# Patient Record
Sex: MALE | Race: WHITE | NOT HISPANIC OR LATINO | URBAN - METROPOLITAN AREA
[De-identification: names, ages, dates, MRNs, and addresses within clinical notes are randomized per-mention and may not be internally consistent; named-entity substitution may affect disease eponyms.]

---

## 2017-04-21 ENCOUNTER — ALLSCRIPTS OFFICE VISIT (OUTPATIENT)
Dept: OTHER | Facility: OTHER | Age: 27
End: 2017-04-21

## 2017-04-21 DIAGNOSIS — Z79.899 OTHER LONG TERM (CURRENT) DRUG THERAPY: ICD-10-CM

## 2017-04-21 DIAGNOSIS — F20.9 SCHIZOPHRENIA (HCC): ICD-10-CM

## 2017-08-14 ENCOUNTER — GENERIC CONVERSION - ENCOUNTER (OUTPATIENT)
Dept: OTHER | Facility: OTHER | Age: 27
End: 2017-08-14

## 2017-08-16 ENCOUNTER — ALLSCRIPTS OFFICE VISIT (OUTPATIENT)
Dept: OTHER | Facility: OTHER | Age: 27
End: 2017-08-16

## 2017-08-16 DIAGNOSIS — E78.5 HYPERLIPIDEMIA: ICD-10-CM

## 2017-08-16 DIAGNOSIS — Z79.899 OTHER LONG TERM (CURRENT) DRUG THERAPY: ICD-10-CM

## 2017-08-16 DIAGNOSIS — Z13.1 ENCOUNTER FOR SCREENING FOR DIABETES MELLITUS: ICD-10-CM

## 2017-09-12 ENCOUNTER — GENERIC CONVERSION - ENCOUNTER (OUTPATIENT)
Dept: OTHER | Facility: OTHER | Age: 27
End: 2017-09-12

## 2017-09-21 RX ORDER — BENZTROPINE MESYLATE 0.5 MG/1
0.5 TABLET ORAL 2 TIMES DAILY
COMMUNITY
End: 2018-05-16

## 2017-09-21 RX ORDER — OLANZAPINE 15 MG/1
15 TABLET ORAL
COMMUNITY

## 2017-09-26 ENCOUNTER — ANESTHESIA EVENT (OUTPATIENT)
Dept: PERIOP | Facility: HOSPITAL | Age: 27
End: 2017-09-26
Payer: MEDICARE

## 2017-09-27 ENCOUNTER — HOSPITAL ENCOUNTER (OUTPATIENT)
Facility: HOSPITAL | Age: 27
Setting detail: OUTPATIENT SURGERY
Discharge: HOME | End: 2017-09-27
Attending: INTERNAL MEDICINE | Admitting: INTERNAL MEDICINE
Payer: MEDICARE

## 2017-09-27 ENCOUNTER — ANESTHESIA (OUTPATIENT)
Dept: PERIOP | Facility: HOSPITAL | Age: 27
End: 2017-09-27
Payer: MEDICARE

## 2017-09-27 ENCOUNTER — GENERIC CONVERSION - ENCOUNTER (OUTPATIENT)
Dept: OTHER | Facility: OTHER | Age: 27
End: 2017-09-27

## 2017-09-27 VITALS
SYSTOLIC BLOOD PRESSURE: 118 MMHG | HEART RATE: 70 BPM | WEIGHT: 204 LBS | RESPIRATION RATE: 18 BRPM | HEIGHT: 68 IN | TEMPERATURE: 97.5 F | OXYGEN SATURATION: 100 % | BODY MASS INDEX: 30.92 KG/M2 | DIASTOLIC BLOOD PRESSURE: 76 MMHG

## 2017-09-27 RX ORDER — PROPOFOL 10 MG/ML
INJECTION, EMULSION INTRAVENOUS AS NEEDED
Status: DISCONTINUED | OUTPATIENT
Start: 2017-09-27 | End: 2017-09-27 | Stop reason: SURG

## 2017-09-27 RX ORDER — SODIUM CHLORIDE, SODIUM LACTATE, POTASSIUM CHLORIDE, CALCIUM CHLORIDE 600; 310; 30; 20 MG/100ML; MG/100ML; MG/100ML; MG/100ML
125 INJECTION, SOLUTION INTRAVENOUS CONTINUOUS
Status: DISCONTINUED | OUTPATIENT
Start: 2017-09-27 | End: 2017-09-27 | Stop reason: HOSPADM

## 2017-09-27 RX ADMIN — PROPOFOL 50 MG: 10 INJECTION, EMULSION INTRAVENOUS at 09:03

## 2017-09-27 RX ADMIN — PROPOFOL 50 MG: 10 INJECTION, EMULSION INTRAVENOUS at 08:59

## 2017-09-27 RX ADMIN — PROPOFOL 50 MG: 10 INJECTION, EMULSION INTRAVENOUS at 09:00

## 2017-09-27 RX ADMIN — PROPOFOL 100 MG: 10 INJECTION, EMULSION INTRAVENOUS at 08:58

## 2017-09-27 RX ADMIN — SODIUM CHLORIDE, SODIUM LACTATE, POTASSIUM CHLORIDE, AND CALCIUM CHLORIDE: .6; .31; .03; .02 INJECTION, SOLUTION INTRAVENOUS at 08:56

## 2017-09-27 NOTE — DISCHARGE INSTRUCTIONS
Colonoscopy   WHAT YOU NEED TO KNOW:   A colonoscopy is a procedure to examine the inside of your colon (intestine) with a scope  Polyps or tissue growths may have been removed during your colonoscopy  It is normal to feel bloated and to have some abdominal discomfort  You should be passing gas  If you have hemorrhoids or you had polyps removed, you may have a small amount of bleeding  DISCHARGE INSTRUCTIONS:   Seek care immediately if:   · You have a large amount of bright red blood in your bowel movements  · Your abdomen is hard and firm and you have severe pain  · You have sudden trouble breathing  Contact your healthcare provider if:   · You develop a rash or hives  · You have a fever within 24 hours of your procedure       · You have not had a bowel movement for 3 days after your procedure  · You have questions or concerns about your condition or care  Activity:   · Do not lift, strain, or run  for 3 days after your procedure  · Rest after your procedure  You have been given medicine to relax you  Do not  drive or make important decisions until the day after your procedure  Return to your normal activity as directed  · Relieve gas and discomfort from bloating  by lying on your right side with a heating pad on your abdomen  You may need to take short walks to help the gas move out  Eat small meals until bloating is relieved  If you had polyps removed: For 7 days after your procedure:  · Do not  take aspirin  · Do not  go on long car rides  Follow up with your healthcare provider as directed:  Write down your questions so you remember to ask them during your visits  © 2017 [employer] is for End User's use only and may not be sold, redistributed or otherwise used for commercial purposes  All illustrations and images included in CareNotes® are the copyrighted property of [employer]  or [employer]    The above information is an  only  It is not intended as medical advice for individual conditions or treatments  Talk to your doctor, nurse or pharmacist before following any medical regimen to see if it is safe and effective for you

## 2017-09-27 NOTE — OP NOTE
**** GI/ENDOSCOPY REPORT ****     PATIENT NAME: Darren High ------ VISIT ID:  Patient ID:   [id number] YOB: 1990     INTRODUCTION: Colonoscopy - A 33 male patient presents for an outpatient   Colonoscopy at [address] [place] [address]  PREVIOUS COLONOSCOPY: 1 year     INDICATIONS: Rectal bleeding  Change in bowel habits  Loss of weight  Hx   polyps FH colon ca Grandfather     CONSENT:  The benefits, risks, and alternatives to the procedure were   discussed and informed consent was obtained from the patient  PREPARATION: EKG, pulse, pulse oximetry and blood pressure were monitored   throughout the procedure  The patient was identified by myself both   verbally and by visual inspection of ID band  Airway Assessment   Classification: Airway class 2 - Visualization of the soft palate, fauces   and uvula  ASA Classification: Class 2 - Patient has mild to moderate   systemic disturbance that may or may not be related to the disorder   requiring surgery  MEDICATIONS: Anesthesia-check records     PROCEDURE:  The endoscope was passed with ease through the anus under   direct visualization and advanced to the terminal ileum, confirmed by   appendiceal orifice, cecal strap (crow's foot), and ileocecal valve  The   scope was withdrawn and the mucosa was carefully examined  The quality of   the preparation was excellent  Cecal Intubation Time: 2 minutes(s) Scope   Withdrawal Time: 4 minutes(s)     RECTAL EXAM: Normal rectal exam      FINDINGS:  The terminal ileum appeared to be normal  The colonoscopy   examination was completely normal  Normal retroversion  COMPLICATIONS: There were no complications  IMPRESSIONS: Normal terminal ileum  Normal colonoscopy  RECOMMENDATIONS: Colonoscopy recommended in 5 years  ESTIMATED BLOOD LOSS: None       PATHOLOGY SPECIMENS: No     PROCEDURE CODES: Colonoscopy     ICD-9 Codes: 569 3 Hemorrhage of rectum and anus 787 99 Other symptoms involving digestive system 783 21 Loss of weight     ICD-10 Codes: K62 5 Hemorrhage of anus and rectum R19 4 Change in bowel   habit R63 4 Abnormal weight loss     PERFORMED BY: Dr Caleb Olmsted, M D Myrna Moritz  on 09/27/2017  Version 1, electronically signed by JARRET Montaño , D O  on   09/27/2017 at 09:09

## 2017-10-06 ENCOUNTER — ALLSCRIPTS OFFICE VISIT (OUTPATIENT)
Dept: OTHER | Facility: OTHER | Age: 27
End: 2017-10-06

## 2017-10-31 ENCOUNTER — ALLSCRIPTS OFFICE VISIT (OUTPATIENT)
Dept: OTHER | Facility: OTHER | Age: 27
End: 2017-10-31

## 2017-11-01 NOTE — PROGRESS NOTES
Assessment  1  Impaired fasting glucose (790 21) (R73 01)   2  Elevated liver enzymes (790 5) (R74 8)   3  Encounter for preventive health examination (V70 0) (Z00 00)   4  Schizophrenia (295 90) (F20 9)    Plan  Elevated liver enzymes, Hyperlipidemia, Impaired fasting glucose    · (1) CBC/ PLT (NO DIFF); Status:Active; Requested for:30Apr2018;    · (1) COMPREHENSIVE METABOLIC PANEL; Status:Active; Requested for:30Apr2018;    · (1) HEMOGLOBIN A1C; Status:Active; Requested for:30Apr2018;    · (1) LIPID PANEL, FASTING; Status:Active; Requested for:30Apr2018;    · (1) TSH; Status:Active; Requested for:30Apr2018; Health Maintenance    · Benztropine Mesylate 0 5 MG Oral Tablet  Need for influenza vaccination    · Fluzone Quadrivalent Intramuscular Suspension    Discussion/Summary  Discussion Summary:   Impaired fasting glucose no evidence of diabetes lifestyle modifications diet exercise weight loss strongly encouraged  abuse he is down to 1-2 cigarettes a day encourage complete cessation  stable on current medication  increase in LDL likely related to his Zyprexa and lack of exercise discussed the importance of exercise low-cholesterol diet and weight loss recheck 6 months  shot given  Chief Complaint  Chief Complaint Free Text Note Form: Routine follow-up and review labs      History of Present Illness  HPI: Feeling wella colonoscopy which was essentially normal but because the family history he will [facility] a therapist through [address]   Complete-Male:   Constitutional: No fever or chills, feels well, no tiredness, no recent weight gain or weight loss  Eyes: No complaints of eye pain, no red eyes, no discharge from eyes, no itchy eyes  ENT: no complaints of earache, no hearing loss, no nosebleeds, no nasal discharge, no sore throat, no hoarseness     Cardiovascular: No complaints of slow heart rate, no fast heart rate, no chest pain, no palpitations, no leg claudication, no lower extremity  Respiratory: No complaints of shortness of breath, no wheezing, no cough, no SOB on exertion, no orthopnea or PND  Gastrointestinal: No complaints of abdominal pain, no constipation, no nausea or vomiting, no diarrhea or bloody stools  Genitourinary: No complaints of dysuria, no incontinence, no hesitancy, no nocturia, no genital lesion, no testicular pain  Musculoskeletal: No complaints of arthralgia, no myalgias, no joint swelling or stiffness, no limb pain or swelling  Integumentary: No complaints of skin rash or skin lesions, no itching, no skin wound, no dry skin  Neurological: No compliants of headache, no confusion, no convulsions, no numbness or tingling, no dizziness or fainting, no limb weakness, no difficulty walking  Psychiatric: Is not suicidal, no sleep disturbances, no anxiety or depression, no change in personality, no emotional problems  Endocrine: No complaints of proptosis, no hot flashes, no muscle weakness, no erectile dysfunction, no deepening of the voice, no feelings of weakness  Hematologic/Lymphatic: No complaints of swollen glands, no swollen glands in the neck, does not bleed easily, no easy bruising  Active Problems  1  Abdominal discomfort, epigastric (789 06) (R10 13)   2  Change in stool caliber (787 99) (R19 4)   3  Change in stool caliber (787 99) (R19 4)   4  Elevated liver enzymes (790 5) (R74 8)   5  Gastroesophageal reflux disease, esophagitis presence not specified (530 81) (K21 9)   6  Helicobacter pylori (H  pylori) infection (041 86) (A04 8)   7  Hematochezia (578 1) (K92 1)   8  High risk medication use (V58 69) (Z79 899)   9  History of colon polyps (V12 72) (Z86 010)   10  Hyperlipidemia (272 4) (E78 5)   11  Impaired fasting glucose (790 21) (R73 01)   12  Jock itch (698 9) (L29 8)   13  Morbid obesity due to excess calories (278 01) (E66 01)   14   Need for influenza vaccination (V04 81) (Z23)   15  Recent weight loss (783 21) (R63 4)   16  Schizophrenia (295 90) (F20 9)   17  Screening for cardiovascular condition (V81 2) (Z13 6)   18  Screening for diabetes mellitus (DM) (V77 1) (Z13 1)   19  Tobacco abuse (305 1) (Z72 0)    Past Medical History  1  History of Hematochezia (578 1) (K92 1)   2  History of psychosis (V11 8) (Z86 59)  Active Problems And Past Medical History Reviewed: The active problems and past medical history were reviewed and updated today  Surgical History  Surgical History Reviewed: The surgical history was reviewed and updated today  Family History  Family History    1  Family history of hypertension (V17 49) (Z82 49)   2  Family history of malignant neoplasm (V16 9) (Z80 9)  Family History Reviewed: The family history was reviewed and updated today  Social History   · Alcohol Use (History)   · Denied: Drug Use (305 90)   · History of Former smoker (V01 10) (L15 829)   · Never a smoker  Social History Reviewed: The social history was reviewed and updated today  Current Meds   1  Benztropine Mesylate 0 5 MG Oral Tablet; TAKE 1 TABLET AT BEDTIME; Therapy: 21Apr2017 to Recorded   2  Hydrocortisone 2 5 % Rectal Cream; USE 1 APPLICATORFUL RECTALLY 1-2  TIMES DAILY as   needed; Therapy: 97UUA8761 to (Last Rx:[id number])  Requested for: 51Ysz4494 Ordered   3  ZyPREXA 15 MG Oral Tablet (OLANZapine); TAKE 1 TABLET DAILY; Therapy: 21Apr2017 to Recorded  Medication List Reviewed: The medication list was reviewed and updated today  Allergies  1  No Known Drug Allergies    Vitals  Vital Signs    Recorded: 30BXP1137 02:31PM   Heart Rate 89   Systolic 481   Diastolic 82   Height 5 ft 11 in   Weight 207 lb 2 0 oz   BMI Calculated 28 89   BSA Calculated 2 14   O2 Saturation 98     Physical Exam    Constitutional   General appearance: No acute distress, well appearing and well nourished  Eyes   Conjunctiva and lids: No swelling, erythema, or discharge  Ears, Nose, Mouth, and Throat   Oropharynx: Normal with no erythema, edema, exudate or lesions  Pulmonary   Respiratory effort: No increased work of breathing or signs of respiratory distress  Auscultation of lungs: Clear to auscultation, equal breath sounds bilaterally, no wheezes, no rales, no rhonci  Cardiovascular   Auscultation of heart: Normal rate and rhythm, normal S1 and S2, without murmurs  Examination of extremities for edema and/or varicosities: Normal     Abdomen   Abdomen: Non-tender, no masses  Lymphatic   Palpation of lymph nodes in neck: No lymphadenopathy  Musculoskeletal   Gait and station: Normal     Skin   Skin and subcutaneous tissue: Normal without rashes or lesions  Neurologic   Cranial nerves: Cranial nerves 2-12 intact  Health Management  Health Maintenance   COLONOSCOPY; every 5 years; Last [id number]; Next Due: 69IUY7684; Active    Future Appointments    Date/Time Provider Specialty Site   05/16/2018 01:00 PM JARRET Haddad   Internal Medicine [facility]     Signatures   Electronically signed by : Porfirio Giordano, [address] [place] [address]; Oct 31 2017  3:03PM EST                       (Author)    Electronically signed by : JARRET Escalona ; Oct 31 2017  6:18PM EST

## 2018-01-12 NOTE — MISCELLANEOUS
Signatures   Electronically signed by : Williams Bolaños MD; Oct  6 2017  9:19AM EST                       (Author)

## 2018-01-13 VITALS
WEIGHT: 212 LBS | HEIGHT: 71 IN | HEART RATE: 80 BPM | SYSTOLIC BLOOD PRESSURE: 110 MMHG | BODY MASS INDEX: 29.68 KG/M2 | DIASTOLIC BLOOD PRESSURE: 80 MMHG

## 2018-01-14 VITALS
HEIGHT: 71 IN | OXYGEN SATURATION: 98 % | WEIGHT: 207.13 LBS | DIASTOLIC BLOOD PRESSURE: 82 MMHG | SYSTOLIC BLOOD PRESSURE: 110 MMHG | HEART RATE: 89 BPM | BODY MASS INDEX: 29 KG/M2

## 2018-01-14 VITALS
RESPIRATION RATE: 14 BRPM | BODY MASS INDEX: 28.86 KG/M2 | DIASTOLIC BLOOD PRESSURE: 80 MMHG | HEIGHT: 71 IN | WEIGHT: 206.13 LBS | HEART RATE: 66 BPM | SYSTOLIC BLOOD PRESSURE: 104 MMHG

## 2018-01-22 VITALS
WEIGHT: 208.13 LBS | HEIGHT: 71 IN | BODY MASS INDEX: 29.14 KG/M2 | DIASTOLIC BLOOD PRESSURE: 70 MMHG | SYSTOLIC BLOOD PRESSURE: 100 MMHG | HEART RATE: 72 BPM

## 2018-04-30 DIAGNOSIS — E78.5 HYPERLIPIDEMIA: ICD-10-CM

## 2018-04-30 DIAGNOSIS — R74.8 ABNORMAL LEVELS OF OTHER SERUM ENZYMES: ICD-10-CM

## 2018-04-30 DIAGNOSIS — R73.01 IMPAIRED FASTING GLUCOSE: ICD-10-CM

## 2018-05-13 ENCOUNTER — HOSPITAL ENCOUNTER (EMERGENCY)
Facility: HOSPITAL | Age: 28
Discharge: HOME | End: 2018-05-13
Attending: EMERGENCY MEDICINE
Payer: MEDICARE

## 2018-05-13 VITALS
TEMPERATURE: 99.9 F | DIASTOLIC BLOOD PRESSURE: 91 MMHG | WEIGHT: 200 LBS | OXYGEN SATURATION: 99 % | RESPIRATION RATE: 20 BRPM | HEART RATE: 118 BPM | SYSTOLIC BLOOD PRESSURE: 141 MMHG | BODY MASS INDEX: 27.89 KG/M2

## 2018-05-13 DIAGNOSIS — R53.1 WEAKNESS: Primary | ICD-10-CM

## 2018-05-13 LAB
ALBUMIN SERPL BCP-MCNC: 4.2 G/DL (ref 3.5–5)
ALP SERPL-CCNC: 70 U/L (ref 46–116)
ALT SERPL W P-5'-P-CCNC: 105 U/L (ref 12–78)
ANION GAP SERPL CALCULATED.3IONS-SCNC: 10 MMOL/L (ref 4–13)
AST SERPL W P-5'-P-CCNC: 40 U/L (ref 5–45)
ATRIAL RATE: 101 BPM
BASOPHILS # BLD AUTO: 0.03 THOUSANDS/ΜL (ref 0–0.1)
BASOPHILS NFR BLD AUTO: 0 % (ref 0–1)
BILIRUB DIRECT SERPL-MCNC: 0.09 MG/DL (ref 0–0.2)
BILIRUB SERPL-MCNC: 0.8 MG/DL (ref 0.2–1)
BUN SERPL-MCNC: 11 MG/DL (ref 5–25)
CALCIUM SERPL-MCNC: 9.6 MG/DL (ref 8.3–10.1)
CHLORIDE SERPL-SCNC: 101 MMOL/L (ref 100–108)
CO2 SERPL-SCNC: 25 MMOL/L (ref 21–32)
CREAT SERPL-MCNC: 1.04 MG/DL (ref 0.6–1.3)
EOSINOPHIL # BLD AUTO: 0.04 THOUSAND/ΜL (ref 0–0.61)
EOSINOPHIL NFR BLD AUTO: 0 % (ref 0–6)
ERYTHROCYTE [DISTWIDTH] IN BLOOD BY AUTOMATED COUNT: 12.2 % (ref 11.6–15.1)
GFR SERPL CREATININE-BSD FRML MDRD: 98 ML/MIN/1.73SQ M
GLUCOSE SERPL-MCNC: 103 MG/DL (ref 65–140)
HCT VFR BLD AUTO: 45.3 % (ref 36.5–49.3)
HGB BLD-MCNC: 15.8 G/DL (ref 12–17)
LYMPHOCYTES # BLD AUTO: 0.8 THOUSANDS/ΜL (ref 0.6–4.47)
LYMPHOCYTES NFR BLD AUTO: 8 % (ref 14–44)
MCH RBC QN AUTO: 29.8 PG (ref 26.8–34.3)
MCHC RBC AUTO-ENTMCNC: 34.9 G/DL (ref 31.4–37.4)
MCV RBC AUTO: 85 FL (ref 82–98)
MONOCYTES # BLD AUTO: 0.55 THOUSAND/ΜL (ref 0.17–1.22)
MONOCYTES NFR BLD AUTO: 5 % (ref 4–12)
NEUTROPHILS # BLD AUTO: 8.89 THOUSANDS/ΜL (ref 1.85–7.62)
NEUTS SEG NFR BLD AUTO: 86 % (ref 43–75)
NRBC BLD AUTO-RTO: 0 /100 WBCS
P AXIS: 51 DEGREES
PLATELET # BLD AUTO: 241 THOUSANDS/UL (ref 149–390)
PMV BLD AUTO: 9.2 FL (ref 8.9–12.7)
POTASSIUM SERPL-SCNC: 4.4 MMOL/L (ref 3.5–5.3)
PR INTERVAL: 138 MS
PROT SERPL-MCNC: 8.3 G/DL (ref 6.4–8.2)
QRS AXIS: 46 DEGREES
QRSD INTERVAL: 88 MS
QT INTERVAL: 342 MS
QTC INTERVAL: 443 MS
RBC # BLD AUTO: 5.31 MILLION/UL (ref 3.88–5.62)
SODIUM SERPL-SCNC: 136 MMOL/L (ref 136–145)
T WAVE AXIS: 40 DEGREES
TSH SERPL DL<=0.05 MIU/L-ACNC: 0.76 UIU/ML (ref 0.36–3.74)
VENTRICULAR RATE: 101 BPM
WBC # BLD AUTO: 10.37 THOUSAND/UL (ref 4.31–10.16)

## 2018-05-13 PROCEDURE — 80048 BASIC METABOLIC PNL TOTAL CA: CPT | Performed by: EMERGENCY MEDICINE

## 2018-05-13 PROCEDURE — 84443 ASSAY THYROID STIM HORMONE: CPT | Performed by: EMERGENCY MEDICINE

## 2018-05-13 PROCEDURE — 96360 HYDRATION IV INFUSION INIT: CPT

## 2018-05-13 PROCEDURE — 93005 ELECTROCARDIOGRAM TRACING: CPT

## 2018-05-13 PROCEDURE — 85025 COMPLETE CBC W/AUTO DIFF WBC: CPT | Performed by: EMERGENCY MEDICINE

## 2018-05-13 PROCEDURE — 99284 EMERGENCY DEPT VISIT MOD MDM: CPT

## 2018-05-13 PROCEDURE — 36415 COLL VENOUS BLD VENIPUNCTURE: CPT | Performed by: EMERGENCY MEDICINE

## 2018-05-13 PROCEDURE — 80076 HEPATIC FUNCTION PANEL: CPT | Performed by: EMERGENCY MEDICINE

## 2018-05-13 PROCEDURE — 93010 ELECTROCARDIOGRAM REPORT: CPT | Performed by: INTERNAL MEDICINE

## 2018-05-13 RX ORDER — OLANZAPINE 2.5 MG/1
5 TABLET ORAL ONCE
Status: COMPLETED | OUTPATIENT
Start: 2018-05-13 | End: 2018-05-13

## 2018-05-13 RX ADMIN — SODIUM CHLORIDE 1000 ML: 0.9 INJECTION, SOLUTION INTRAVENOUS at 20:27

## 2018-05-13 RX ADMIN — OLANZAPINE 5 MG: 2.5 TABLET, FILM COATED ORAL at 20:12

## 2018-05-13 NOTE — ED PROVIDER NOTES
History  Chief Complaint   Patient presents with    Headache     Pt states that he forgot to take his zyprexa last night and states that since then he has been having bodyaches, headaches and racing heart  HPI patient is a 55-year-old male, he reports that last night he forgot to take his Zyprexa, reports since then he has felt like he feels ill, reports body aches, feels like his heart is racing  Patient reports feeling just out of sorts  He denies any shortness of breath  Denies any chest pain  There is no loss of consciousness  Denies any focal weakness  Patient reports he just feels in general bad  Past medical history of GE reflux, elevated cholesterol, psychosis  Family history noncontributory  Social history, nonsmoker no history of drug abuse  Prior to Admission Medications   Prescriptions Last Dose Informant Patient Reported? Taking? OLANZapine (ZyPREXA) 15 mg tablet   Yes No   Sig: Take 15 mg by mouth daily at bedtime   benztropine (COGENTIN) 0 5 mg tablet   Yes No   Sig: Take 0 5 mg by mouth 2 (two) times a day      Facility-Administered Medications: None       Past Medical History:   Diagnosis Date    GERD (gastroesophageal reflux disease)     H  pylori infection     Hematochezia     Hypercholesteremia     Hyperlipidemia     Psychosis     Last Assessed:  11/19/15    Schizophrenia [facility])        Past Surgical History:   Procedure Laterality Date    COLONOSCOPY      [place] COLONOSCOPY FLX DX W/COLLJ SPEC WHEN PFRMD N/A 9/27/2017    Procedure: COLONOSCOPY;  Surgeon: Divina Lara MD;  Location: [facility]; Service: Gastroenterology       Family History   Problem Relation Age of Onset    Hypertension Family     Cancer Family      I have reviewed and agree with the history as documented      Social History   Substance Use Topics    Smoking status: Former Smoker    Smokeless tobacco: Never Used      Comment: Per [employer]:  Never a smoker    Alcohol use Yes      Comment: occ Review of Systems   Constitutional: Negative for diaphoresis, fatigue and fever  HENT: Negative for congestion, ear pain, nosebleeds and sore throat  Eyes: Negative for photophobia, pain, discharge and visual disturbance  Respiratory: Negative for cough, choking, chest tightness, shortness of breath and wheezing  Cardiovascular: Positive for palpitations  Negative for chest pain  Gastrointestinal: Negative for abdominal distention, abdominal pain, diarrhea and vomiting  Genitourinary: Negative for dysuria, flank pain and frequency  Musculoskeletal: Negative for back pain, gait problem and joint swelling  Skin: Negative for color change and rash  Neurological: Positive for weakness  Negative for dizziness, syncope and headaches  Psychiatric/Behavioral: Negative for behavioral problems and confusion  The patient is not nervous/anxious  All other systems reviewed and are negative  Reports diffuse body aches    Physical Exam  ED Triage Vitals   Temperature Pulse Respirations Blood Pressure SpO2   05/13/18 1951 05/13/18 1949 05/13/18 1949 05/13/18 1949 05/13/18 1949   99 9 °F (37 7 °C) (!) 118 20 141/91 99 %      Temp Source Heart Rate Source Patient Position - Orthostatic VS BP Location FiO2 (%)   05/13/18 1949 05/13/18 1949 05/13/18 1949 05/13/18 1949 --   Oral Monitor Sitting Left arm       Pain Score       05/13/18 1949       5           Orthostatic Vital Signs  Vitals:    05/13/18 1949   BP: 141/91   Pulse: (!) 118   Patient Position - Orthostatic VS: Sitting       Physical Exam   Constitutional: He is oriented to person, place, and time  He appears well-developed and well-nourished  HENT:   Head: Normocephalic  Right Ear: External ear normal    Left Ear: External ear normal    Nose: Nose normal    Mouth/Throat: Oropharynx is clear and moist    Eyes: EOM and lids are normal  Pupils are equal, round, and reactive to light  Neck: Normal range of motion  Neck supple  Cardiovascular: Normal rate, regular rhythm and normal heart sounds  Pulmonary/Chest: Effort normal and breath sounds normal  No respiratory distress  Abdominal: Soft  Bowel sounds are normal    Musculoskeletal: Normal range of motion  He exhibits no deformity  Neurological: He is alert and oriented to person, place, and time  Skin: Skin is warm and dry  Psychiatric: He has a normal mood and affect  Nursing note and vitals reviewed  Pulse oximetry 99% on room air adequate oxygenation, there is no hypoxia    ED Medications  Medications   sodium chloride 0 9 % bolus 1,000 mL (1,000 mL Intravenous New Bag 5/13/18 2027)   OLANZapine (ZyPREXA) tablet 5 mg (5 mg Oral Given 5/13/18 2012)       Diagnostic Studies  Results Reviewed     Procedure Component Value Units Date/Time    Basic metabolic panel [[id number]] Collected:  05/13/18 2024    Lab Status:  Final result Specimen:  Blood from Arm, Right Updated:  05/13/18 2108     Sodium 136 mmol/L      Potassium 4 4 mmol/L      Chloride 101 mmol/L      CO2 25 mmol/L      Anion Gap 10 mmol/L      BUN 11 mg/dL      Creatinine 1 04 mg/dL      Glucose 103 mg/dL      Calcium 9 6 mg/dL      eGFR 98 ml/min/1 73sq m     Narrative:         National Kidney Disease Education Program recommendations are as follows:  GFR calculation is accurate only with a steady state creatinine  Chronic Kidney disease less than 60 ml/min/1 73 sq  meters  Kidney failure less than 15 ml/min/1 73 sq  meters      Hepatic function panel [[id number]]  (Abnormal) Collected:  05/13/18 2024    Lab Status:  Final result Specimen:  Blood from Arm, Right Updated:  05/13/18 2108     Total Bilirubin 0 80 mg/dL      Bilirubin, Direct 0 09 mg/dL      Alkaline Phosphatase 70 U/L      AST 40 U/L       (H) U/L      Total Protein 8 3 (H) g/dL      Albumin 4 2 g/dL     TSH [[id number]]  (Normal) Collected:  05/13/18 2024    Lab Status:  Final result Specimen:  Blood from Arm, Right Updated:  05/13/18 2108 TSH 3RD GENERATON 0 763 uIU/mL     Narrative:         Patients undergoing fluorescein dye angiography may retain small amounts of fluorescein in the body for 48-72 hours post procedure  Samples containing fluorescein can produce falsely depressed TSH values  If the patient had this procedure,a specimen should be resubmitted post fluorescein clearance  CBC and differential [[id number]]  (Abnormal) Collected:  05/13/18 2024    Lab Status:  Final result Specimen:  Blood from Hand, Left Updated:  05/13/18 2036     WBC 10 37 (H) Thousand/uL      RBC 5 31 Million/uL      Hemoglobin 15 8 g/dL      Hematocrit 45 3 %      MCV 85 fL      MCH 29 8 pg      MCHC 34 9 g/dL      RDW 12 2 %      MPV 9 2 fL      Platelets 359 Thousands/uL      nRBC 0 /100 WBCs      Neutrophils Relative 86 (H) %      Lymphocytes Relative 8 (L) %      Monocytes Relative 5 %      Eosinophils Relative 0 %      Basophils Relative 0 %      Neutrophils Absolute 8 89 (H) Thousands/µL      Lymphocytes Absolute 0 80 Thousands/µL      Monocytes Absolute 0 55 Thousand/µL      Eosinophils Absolute 0 04 Thousand/µL      Basophils Absolute 0 03 Thousands/µL                  No orders to display              Procedures  Procedures       Phone Contacts  ED Phone Contact    ED Course         Diagnostic testing showed normal electrolytes, patient's blood sugar was 103, patient was told previously was prediabetic no sign of elevated blood sugar at this time, patient's liver functions were within normal limits, thyroid functions were normal  White count was minimally elevated at 10 3, hemoglobin was normal no sign of anemia  Patient markedly improved with hydration, also felt improved with additional dose of Zyprexa  MDM medical decision making 52-year-old male, feels weak and not himself, body aches after missing his Zyprexa dose last night, now seems improved, improved with hydration improved with additional Zyprexa    We discussed outpatient treatment and follow-up, we discussed indications to return  CritCare Time    Disposition  Final diagnoses:   Weakness     Time reflects when diagnosis was documented in both MDM as applicable and the Disposition within this note     Time User Action Codes Description Comment    5/13/2018  9:13 PM Nbasylvia Bryan Add [R53 1] Weakness       ED Disposition     ED Disposition Condition Comment    Discharge  Mario Astorga discharge to home/self care  Condition at discharge: Good        Follow-up Information     Follow up With Specialties Details Why Rose Berry MD Internal Medicine   [address] [place] [phone]          Patient's Medications   Discharge Prescriptions    No medications on file     No discharge procedures on file      ED Provider  Electronically Signed by           Luis Carlos Mancuso MD  05/13/18 [id number]

## 2018-05-14 NOTE — ED NOTES
D/c instructions reviewed with pt  Pt verbalized understanding and has no further questions at this time  Pt ambulatory off unit with steady gait       Arpan Saez RN  05/13/18 7493

## 2018-05-14 NOTE — DISCHARGE INSTRUCTIONS
Medications as normal  Rest  Increase liquids  Follow up with your doctor or return if worse  Weakness   WHAT YOU NEED TO KNOW:   Weakness is a loss of muscle strength  It may be caused by brain, nerve, or muscle problems  Physical and mental conditions such as heart problems, pregnancy, dehydration, or depression may also cause weakness  Reactions to certain drugs can cause weakness  Parts of your body may become weak if you need to wear a cast or splint or have been on bed rest for a long time  DISCHARGE INSTRUCTIONS:   Call 911 for any of the following:   · You have any of the following signs of a stroke:      ¨ Numbness or drooping on one side of your face     ¨ Weakness in an arm or leg    ¨ Confusion or difficulty speaking    ¨ Dizziness, a severe headache, or vision loss    · You lose feeling in your weakened body area  · You have electric shock-like feelings down your arms and legs when you flex or move your neck  · You have sudden or increased trouble speaking, swallowing, or breathing  Return to the emergency department if:   · You have severe pain in your back, arms, or legs that worsens  · You have sudden or worsened muscle weakness or loss of movement  · You are not able to control when you urinate or have a bowel movement  Contact your healthcare provider if:   · You feel depressed or anxious  · You have questions or concerns about your condition or care  Manage weakness:   · Use assistive devices as directed  These help protect you from injury  Examples include a walker or cane  Have someone install handrails in your home  These will help you get out of a bathtub or stand up from a toilet  Use a shower chair so you can sit while you shower  Sit down on the toilet or another chair to dry off and put on your clothes  Get help going up and down stairs if your legs are weak  · Go to physical or occupational therapy if directed    A physical therapist can teach you exercises to help strengthen weak muscles  An occupational therapist can show you ways to do your daily activities more easily  For example, light forks and spoons can be easier to use if you have hand weakness  You may also learn ways to organize your household items so you are not moving heavy items  · Balance rest with exercise  Exercise can help increase your muscle strength and energy  Do not exercise for long periods at a time  Take breaks often to rest  Too much exercise can cause muscle strain or make you more tired  Ask your healthcare provider how much exercise is right for you  · Eat a variety of healthy foods  Too much or too little food may cause weakness or tiredness  Ask your healthcare provider what a healthy amount of food is for you  Healthy foods include fruits, vegetables, whole-grain breads, low-fat dairy products, lean meats and fish, nuts, and cooked beans  · Do not smoke  Nicotine and other chemicals in cigarettes and cigars can make your symptoms worse, and can cause lung damage  Ask your healthcare provider for information if you currently smoke and need help to quit  E-cigarettes or smokeless tobacco still contain nicotine  Talk to your healthcare provider before you use these products  · Do not use caffeine, alcohol, or illegal drugs  These may cause muscle twitching, which could lead to worsened weakness  Follow up with your healthcare provider as directed:  Write down your questions so you remember to ask them during your visits  © 2017 [address] [employer] Information is for End User's use only and may not be sold, redistributed or otherwise used for commercial purposes  All illustrations and images included in CareNotes® are the copyrighted property of A [employer]  or Anjum De La [employer]  The above information is an  only  It is not intended as medical advice for individual conditions or treatments   Talk to your doctor, nurse or pharmacist before following any medical regimen to see if it is safe and effective for you

## 2018-05-15 RX ORDER — OLANZAPINE 15 MG/1
1 TABLET ORAL DAILY
COMMUNITY
Start: 2017-04-21 | End: 2018-05-16 | Stop reason: CLARIF

## 2018-05-16 ENCOUNTER — OFFICE VISIT (OUTPATIENT)
Dept: INTERNAL MEDICINE CLINIC | Facility: CLINIC | Age: 28
End: 2018-05-16
Payer: MEDICARE

## 2018-05-16 ENCOUNTER — APPOINTMENT (OUTPATIENT)
Dept: LAB | Facility: CLINIC | Age: 28
End: 2018-05-16
Payer: MEDICARE

## 2018-05-16 VITALS
SYSTOLIC BLOOD PRESSURE: 110 MMHG | RESPIRATION RATE: 14 BRPM | HEART RATE: 80 BPM | BODY MASS INDEX: 32.4 KG/M2 | HEIGHT: 68 IN | WEIGHT: 213.8 LBS | DIASTOLIC BLOOD PRESSURE: 80 MMHG

## 2018-05-16 DIAGNOSIS — B35.6 JOCK ITCH: ICD-10-CM

## 2018-05-16 DIAGNOSIS — R74.01 ELEVATED TRANSAMINASE LEVEL: Primary | ICD-10-CM

## 2018-05-16 DIAGNOSIS — R74.01 ELEVATED TRANSAMINASE LEVEL: ICD-10-CM

## 2018-05-16 DIAGNOSIS — R19.7 DIARRHEA, UNSPECIFIED TYPE: ICD-10-CM

## 2018-05-16 DIAGNOSIS — R74.8 ELEVATED LIVER ENZYMES: ICD-10-CM

## 2018-05-16 DIAGNOSIS — K21.9 GASTROESOPHAGEAL REFLUX DISEASE WITHOUT ESOPHAGITIS: ICD-10-CM

## 2018-05-16 DIAGNOSIS — E78.2 MIXED HYPERLIPIDEMIA: ICD-10-CM

## 2018-05-16 DIAGNOSIS — R73.01 IMPAIRED FASTING GLUCOSE: ICD-10-CM

## 2018-05-16 DIAGNOSIS — F20.9 SCHIZOPHRENIA, UNSPECIFIED TYPE (HCC): ICD-10-CM

## 2018-05-16 PROBLEM — E78.5 HYPERLIPIDEMIA: Status: ACTIVE | Noted: 2017-08-16

## 2018-05-16 LAB
FERRITIN SERPL-MCNC: 268 NG/ML (ref 8–388)
IRON SATN MFR SERPL: 18 %
IRON SERPL-MCNC: 58 UG/DL (ref 65–175)
TIBC SERPL-MCNC: 327 UG/DL (ref 250–450)

## 2018-05-16 PROCEDURE — 83540 ASSAY OF IRON: CPT

## 2018-05-16 PROCEDURE — 83550 IRON BINDING TEST: CPT

## 2018-05-16 PROCEDURE — 36415 COLL VENOUS BLD VENIPUNCTURE: CPT

## 2018-05-16 PROCEDURE — 99214 OFFICE O/P EST MOD 30 MIN: CPT | Performed by: INTERNAL MEDICINE

## 2018-05-16 PROCEDURE — 82390 ASSAY OF CERULOPLASMIN: CPT

## 2018-05-16 PROCEDURE — 86256 FLUORESCENT ANTIBODY TITER: CPT

## 2018-05-16 PROCEDURE — 82728 ASSAY OF FERRITIN: CPT

## 2018-05-16 PROCEDURE — 86038 ANTINUCLEAR ANTIBODIES: CPT

## 2018-05-16 PROCEDURE — 87340 HEPATITIS B SURFACE AG IA: CPT

## 2018-05-16 PROCEDURE — 86704 HEP B CORE ANTIBODY TOTAL: CPT

## 2018-05-16 PROCEDURE — 86803 HEPATITIS C AB TEST: CPT

## 2018-05-16 PROCEDURE — 86705 HEP B CORE ANTIBODY IGM: CPT

## 2018-05-16 PROCEDURE — 86235 NUCLEAR ANTIGEN ANTIBODY: CPT

## 2018-05-16 RX ORDER — NYSTATIN 100000 U/G
CREAM TOPICAL 2 TIMES DAILY
Qty: 30 G | Refills: 0 | Status: SHIPPED | OUTPATIENT
Start: 2018-05-16 | End: 2018-05-17 | Stop reason: SDUPTHER

## 2018-05-16 RX ORDER — RANITIDINE 150 MG/1
150 TABLET ORAL 2 TIMES DAILY
Qty: 60 TABLET | Refills: 3 | Status: SHIPPED | OUTPATIENT
Start: 2018-05-16 | End: 2018-05-17 | Stop reason: SDUPTHER

## 2018-05-16 NOTE — PATIENT INSTRUCTIONS
Lab data reviewed and compared prior     Impaired fasting glucose remains stable with A1c 5 3     Hyperlipidemia - elevated triglycerides, low HDL, acceptable LDL-no indication for medication, increased aerobic exercise with a goal of 30 min per day, 5 days per week and weight loss as able are advised    Schizophrenia managed by psychiatry  Generalized weakness and tachycardia is of unclear etiology -symptoms and vital signs have stabilized, will continue to monitor    GERD -get back on Zantac /ranitidine 150 mg once or twice daily as needed  Lifestyle modifications including weight loss, avoiding over eating, avoiding laying down within 3 hr of eating, avoiding spicy foods, caffeine and nicotine can all be beneficial       Diarrhea - this appears to be self limited, exam and history are unremarkable  Follow a brat diet until symptoms resolve brat stands for bananas, rice, applesauce and toast   Consider using a probiotic such as yogurt or over-the-counter probiotic  Also consider Pepto-Bismol or Kaopectate as needed if symptoms fail to improve  If symptoms persist beyond 1 week you needle lab workup, contact me  Elevated transaminases -this is been present for at least 1 years reviewing old labs but has not been worked out to rule out underlying autoimmune, infiltrative or infectious etiology, complete lab workup and ultrasound as ordered  Can be related to non alcoholic fatty liver disease versus medication effect versus other  Jock itch -apply nystatin cream twice daily, attempt to keep the area dry and clean        Follow up with me after a for mentioned workup, sooner as needed

## 2018-05-16 NOTE — PROGRESS NOTES
Assessment/Plan:    Patient Instructions    Generalized weakness and tachycardia is of unclear etiology -symptoms and vital signs have stabilized, will continue to monitor    GERD -get back on Zantac /ranitidine 150 mg once or twice daily as needed  Lifestyle modifications including weight loss, avoiding over eating, avoiding laying down within 3 hr of eating, avoiding spicy foods, caffeine and nicotine can all be beneficial       Diarrhea - this appears to be self limited, exam and history are unremarkable  Follow a brat diet until symptoms resolve brat stands for bananas, rice, applesauce and toast   Consider using a probiotic such as yogurt or over-the-counter probiotic  Also consider Pepto-Bismol or Kaopectate as needed if symptoms fail to improve  If symptoms persist beyond 1 week you needle lab workup, contact me  Elevated transaminases -this is been present for at least 1 years reviewing old labs but has not been worked out to rule out underlying autoimmune, infiltrative or infectious etiology, complete lab workup and ultrasound as ordered  Can be related to non alcoholic fatty liver disease versus medication effect versus other  Jock itch -apply nystatin cream twice daily, attempt to keep the area dry and clean  Follow up with me after a for mentioned workup, sooner as needed         Diagnoses and all orders for this visit:    Elevated transaminase level  -     MYCHAL SCR IFA W RFLX TTR/PTRN/LUP PNL 2(Q) + PNL5(HISTORICAL); Future  -     Anti-Smooth Muscle/Mitochond; Future  -     Ceruloplasmin; Future  -     Chronic Hepatitis Panel; Future  -     Iron Saturation %; Future  -     Iron Panel; Future  -     US right upper quadrant with liver dopplers; Future    Diarrhea, unspecified type    Gastroesophageal reflux disease without esophagitis  -     ranitidine (ZANTAC) 150 mg tablet;  Take 1 tablet (150 mg total) by mouth 2 (two) times a day    Impaired fasting glucose    Jock itch  - nystatin (MYCOSTATIN) cream; Apply topically 2 (two) times a day    Elevated liver enzymes    Mixed hyperlipidemia    Schizophrenia, unspecified type ([facility] [address] )    Other orders  -     Discontinue: hydrocortisone (ANUSOL-HC, PROCTOSOL HC,) 2 5 % rectal cream; Insert into the rectum  -     Discontinue: OLANZapine (ZYPREXA) 15 mg tablet; Take 1 tablet by mouth daily         Subjective:      Patient ID: Inna Gomez is a 33 y o  male    Patient presents with a number of concerns    Patient was seen in the emergency department on May 13th because he was not feeling well he was feeling anxious and weak  He was noted to have borderline blood pressure, elevated heart rate 118  Lab workup was essentially unremarkable and he was discharged  The feeling was it may have been related to skipping a dose of Zyprexa the night before  He is back to his baseline as far as that is concerned today  He stopped Cogentin last year  He continues on Zyprexa [facility] of [facility]       He notes 1 day of diarrhea, 6 episodes yesterday and 1 episode today  There has been no blood or mucus in the stool  No fevers or chills  He has not had any abdominal pain or nausea or vomiting  He feels as though this is improving  He has not taken anything for the diarrhea  There has been no recent antibiotics  No recent travel  He notes many years of jock itch that have failed over-the-counter creams as well as 1 prescription cream in the past that he thinks was hydrocortisone    He was able quit smoking last October       he also notes GERD symptoms that in the past did well with ranitidine          Current Outpatient Prescriptions:     OLANZapine (ZyPREXA) 15 mg tablet, Take 15 mg by mouth daily at bedtime, Disp: , Rfl:     nystatin (MYCOSTATIN) cream, Apply topically 2 (two) times a day, Disp: 30 g, Rfl: 0    ranitidine (ZANTAC) 150 mg tablet, Take 1 tablet (150 mg total) by mouth 2 (two) times a day, Disp: 60 tablet, Rfl: 3    Recent Results (from the past 1008 hour(s))   CBC and differential    Collection Time: 05/13/18  8:24 PM   Result Value Ref Range    WBC 10 37 (H) 4 31 - 10 16 Thousand/uL    RBC 5 31 3 88 - 5 62 Million/uL    Hemoglobin 15 8 12 0 - 17 0 g/dL    Hematocrit 45 3 36 5 - 49 3 %    MCV 85 82 - 98 fL    MCH 29 8 26 8 - 34 3 pg    MCHC 34 9 31 4 - 37 4 g/dL    RDW 12 2 11 6 - 15 1 %    MPV 9 2 8 9 - 12 7 fL    Platelets 140 084 - 979 Thousands/uL    nRBC 0 /100 WBCs    Neutrophils Relative 86 (H) 43 - 75 %    Lymphocytes Relative 8 (L) 14 - 44 %    Monocytes Relative 5 4 - 12 %    Eosinophils Relative 0 0 - 6 %    Basophils Relative 0 0 - 1 %    Neutrophils Absolute 8 89 (H) 1 85 - 7 62 Thousands/µL    Lymphocytes Absolute 0 80 0 60 - 4 47 Thousands/µL    Monocytes Absolute 0 55 0 17 - 1 22 Thousand/µL    Eosinophils Absolute 0 04 0 00 - 0 61 Thousand/µL    Basophils Absolute 0 03 0 00 - 0 10 Thousands/µL   Basic metabolic panel    Collection Time: 05/13/18  8:24 PM   Result Value Ref Range    Sodium 136 136 - 145 mmol/L    Potassium 4 4 3 5 - 5 3 mmol/L    Chloride 101 100 - 108 mmol/L    CO2 25 21 - 32 mmol/L    Anion Gap 10 4 - 13 mmol/L    BUN 11 5 - 25 mg/dL    Creatinine 1 04 0 60 - 1 30 mg/dL    Glucose 103 65 - 140 mg/dL    Calcium 9 6 8 3 - 10 1 mg/dL    eGFR 98 ml/min/1 73sq m   Hepatic function panel    Collection Time: 05/13/18  8:24 PM   Result Value Ref Range    Total Bilirubin 0 80 0 20 - 1 00 mg/dL    Bilirubin, Direct 0 09 0 00 - 0 20 mg/dL    Alkaline Phosphatase 70 46 - 116 U/L    AST 40 5 - 45 U/L     (H) 12 - 78 U/L    Total Protein 8 3 (H) 6 4 - 8 2 g/dL    Albumin 4 2 3 5 - 5 0 g/dL   TSH    Collection Time: 05/13/18  8:24 PM   Result Value Ref Range    TSH 3RD GENERATON 0 763 0 358 - 3 740 uIU/mL   ECG 12 lead    Collection Time: 05/13/18  8:39 PM   Result Value Ref Range    Ventricular Rate 101 BPM    Atrial Rate 101 BPM    MI Interval 138 ms    QRSD Interval 88 ms    QT Interval 342 ms    QTC Interval 443 ms    P [place] 51 degrees    QRS Axis 46 degrees    T Wave Axis 40 degrees       The following portions of the patient's history were reviewed and updated as appropriate: allergies, current medications, past family history, past medical history, past social history, past surgical history and problem list      Review of Systems   Constitutional: Negative for appetite change, chills, diaphoresis, fatigue, fever and unexpected weight change  HENT: Negative for congestion, hearing loss and rhinorrhea  Eyes: Negative for visual disturbance  Respiratory: Negative for cough, chest tightness, shortness of breath and wheezing  Cardiovascular: Negative for chest pain, palpitations and leg swelling  Gastrointestinal: Positive for diarrhea  Negative for abdominal pain and blood in stool  Endocrine: Negative for cold intolerance, heat intolerance, polydipsia and polyuria  Genitourinary: Negative for difficulty urinating, dysuria, frequency and urgency  Musculoskeletal: Negative for arthralgias and myalgias  Skin: Positive for rash  Neurological: Negative for dizziness, weakness, light-headedness and headaches  Hematological: Does not bruise/bleed easily  Psychiatric/Behavioral: Negative for dysphoric mood and sleep disturbance  Objective:      Vitals:    05/16/18 1255   BP: 110/80   Pulse: 80   Resp: 14          Physical Exam   Constitutional: He is oriented to person, place, and time  He appears well-developed and well-nourished  HENT:   Head: Normocephalic and atraumatic  Nose: Nose normal    Mouth/Throat: Oropharynx is clear and moist    Eyes: Conjunctivae and EOM are normal  Pupils are equal, round, and reactive to light  No scleral icterus  Neck: Normal range of motion  Neck supple  No JVD present  No tracheal deviation present  No thyromegaly present  Cardiovascular: Normal rate, regular rhythm and intact distal pulses  Exam reveals no gallop and no friction rub      No murmur heard  Pulmonary/Chest: Effort normal and breath sounds normal  No respiratory distress  He has no wheezes  He has no rales  Abdominal: Soft  Bowel sounds are normal  He exhibits no distension and no mass  There is no tenderness  There is no rebound and no guarding  Musculoskeletal: He exhibits no edema or tenderness  Lymphadenopathy:     He has no cervical adenopathy  Neurological: He is alert and oriented to person, place, and time  No cranial nerve deficit  Skin: Skin is warm and dry  No rash noted  No erythema  Hyper pigmented well  Demarcated area in intertriginous area of groin   Psychiatric: He has a normal mood and affect   His behavior is normal  Judgment and thought content normal

## 2018-05-17 DIAGNOSIS — B35.6 JOCK ITCH: ICD-10-CM

## 2018-05-17 DIAGNOSIS — K21.9 GASTROESOPHAGEAL REFLUX DISEASE WITHOUT ESOPHAGITIS: ICD-10-CM

## 2018-05-17 LAB
ACTIN IGG SERPL-ACNC: 6 UNITS (ref 0–19)
CERULOPLASMIN SERPL-MCNC: 28.4 MG/DL (ref 16–31)
HBV CORE AB SER QL: NORMAL
HBV CORE IGM SER QL: NORMAL
HBV SURFACE AG SER QL: NORMAL
HCV AB SER QL: NORMAL
MITOCHONDRIA M2 IGG SER-ACNC: 2.5 UNITS (ref 0–20)

## 2018-05-17 RX ORDER — RANITIDINE 150 MG/1
150 TABLET ORAL 2 TIMES DAILY
Qty: 60 TABLET | Refills: 3 | Status: SHIPPED | OUTPATIENT
Start: 2018-05-17

## 2018-05-17 RX ORDER — NYSTATIN 100000 U/G
CREAM TOPICAL 2 TIMES DAILY
Qty: 30 G | Refills: 0 | Status: SHIPPED | OUTPATIENT
Start: 2018-05-17 | End: 2018-06-15

## 2018-05-17 NOTE — TELEPHONE ENCOUNTER
Pt called and requested the nystatin and ranitidine be sent to [facility] instead

## 2018-05-18 LAB — RYE IGE QN: NEGATIVE

## 2018-05-23 ENCOUNTER — HOSPITAL ENCOUNTER (OUTPATIENT)
Dept: ULTRASOUND IMAGING | Facility: CLINIC | Age: 28
Discharge: HOME | End: 2018-05-23
Payer: MEDICARE

## 2018-05-23 DIAGNOSIS — R74.01 ELEVATED TRANSAMINASE LEVEL: ICD-10-CM

## 2018-05-23 PROCEDURE — 76705 ECHO EXAM OF ABDOMEN: CPT

## 2018-06-15 ENCOUNTER — OFFICE VISIT (OUTPATIENT)
Dept: INTERNAL MEDICINE CLINIC | Facility: CLINIC | Age: 28
End: 2018-06-15
Payer: MEDICARE

## 2018-06-15 VITALS
WEIGHT: 208 LBS | SYSTOLIC BLOOD PRESSURE: 120 MMHG | HEIGHT: 71 IN | BODY MASS INDEX: 29.12 KG/M2 | HEART RATE: 70 BPM | OXYGEN SATURATION: 98 % | DIASTOLIC BLOOD PRESSURE: 82 MMHG

## 2018-06-15 DIAGNOSIS — K21.9 GASTROESOPHAGEAL REFLUX DISEASE WITHOUT ESOPHAGITIS: Primary | ICD-10-CM

## 2018-06-15 DIAGNOSIS — F20.9 SCHIZOPHRENIA, UNSPECIFIED TYPE (HCC): ICD-10-CM

## 2018-06-15 DIAGNOSIS — R74.8 ELEVATED LIVER ENZYMES: ICD-10-CM

## 2018-06-15 DIAGNOSIS — B35.6 JOCK ITCH: ICD-10-CM

## 2018-06-15 DIAGNOSIS — R73.01 IMPAIRED FASTING GLUCOSE: ICD-10-CM

## 2018-06-15 DIAGNOSIS — E78.2 MIXED HYPERLIPIDEMIA: ICD-10-CM

## 2018-06-15 DIAGNOSIS — K76.0 NON-ALCOHOLIC FATTY LIVER DISEASE: ICD-10-CM

## 2018-06-15 PROCEDURE — 99214 OFFICE O/P EST MOD 30 MIN: CPT | Performed by: INTERNAL MEDICINE

## 2018-06-15 RX ORDER — CLOTRIMAZOLE AND BETAMETHASONE DIPROPIONATE 10; .64 MG/G; MG/G
CREAM TOPICAL
Qty: 30 G | Refills: 0 | Status: SHIPPED | OUTPATIENT
Start: 2018-06-15 | End: 2018-06-30 | Stop reason: SDUPTHER

## 2018-06-15 NOTE — PATIENT INSTRUCTIONS
Lab data reviewed in detail and compared prior     Jock itch -trial of Lotrisone, contact me if symptoms fail to improve    Elevated transaminases with negative workup other than ultrasound showing hepatic steatosis is consistent with nonalcoholic fatty liver disease that can be contributed to by Zyprexa as well as obesity  Patient is clinically stable on Zyprexa therefore will monitor liver enzymes at least twice yearly and patient is encouraged to lose weight with a goal of 10-15 lb weight loss by follow-up in 6 months as well as following a low-fat low-cholesterol diet  GERD is stable on Zantac    Routine follow-up after labs in 6 months, sooner as needed

## 2018-06-15 NOTE — PROGRESS NOTES
Assessment/Plan:    Patient Instructions    Lab data reviewed in detail and compared prior     Jock itch -trial of Lotrisone, contact me if symptoms fail to improve    Elevated transaminases with negative workup other than ultrasound showing hepatic steatosis is consistent with nonalcoholic fatty liver disease that can be contributed to by Zyprexa as well as obesity  Patient is clinically stable on Zyprexa therefore will monitor liver enzymes at least twice yearly and patient is encouraged to lose weight with a goal of 10-15 lb weight loss by follow-up in 6 months as well as following a low-fat low-cholesterol diet  GERD is stable on Zantac    Routine follow-up after labs in 6 months, sooner as needed  Diagnoses and all orders for this visit:    Gastroesophageal reflux disease without esophagitis    Impaired fasting glucose  -     Hemoglobin A1c (w/out EAG); Future    Jock itch  -     clotrimazole-betamethasone (LOTRISONE) 1-0 05 % cream; Apply to affected areas in inguinal region twice daily as needed    Elevated liver enzymes    Mixed hyperlipidemia  -     CBC; Future  -     Comprehensive metabolic panel; Future  -     Lipid panel; Future  -     TSH, 3rd generation with Free T4 reflex; Future    Schizophrenia, unspecified type ([facility] [address] )    Non-alcoholic fatty liver disease         Subjective:      Patient ID: Can Robin is a 33 y o  male    Feeling generally well    He does not think the nystatin worked for the [employer], he used it twice a day for 2 weeks and still has itching and irritation  Schizophrenia has been under stable control on Zyprexa at [facility]    GERD has been stable on Zantac 150 mg twice daily    He has been exercising sporadically taking a walk            Current Outpatient Prescriptions:     OLANZapine (ZyPREXA) 15 mg tablet, Take 15 mg by mouth daily at bedtime, Disp: , Rfl:     ranitidine (ZANTAC) 150 mg tablet, Take 1 tablet (150 mg total) by mouth 2 (two) times a day, Disp: 60 tablet, Rfl: 3    clotrimazole-betamethasone (LOTRISONE) 1-0 05 % cream, Apply to affected areas in inguinal region twice daily as needed, Disp: 30 g, Rfl: 0    Recent Results (from the past 1008 hour(s))   CBC and differential    Collection Time: 05/13/18  8:24 PM   Result Value Ref Range    WBC 10 37 (H) 4 31 - 10 16 Thousand/uL    RBC 5 31 3 88 - 5 62 Million/uL    Hemoglobin 15 8 12 0 - 17 0 g/dL    Hematocrit 45 3 36 5 - 49 3 %    MCV 85 82 - 98 fL    MCH 29 8 26 8 - 34 3 pg    MCHC 34 9 31 4 - 37 4 g/dL    RDW 12 2 11 6 - 15 1 %    MPV 9 2 8 9 - 12 7 fL    Platelets 909 105 - 593 Thousands/uL    nRBC 0 /100 WBCs    Neutrophils Relative 86 (H) 43 - 75 %    Lymphocytes Relative 8 (L) 14 - 44 %    Monocytes Relative 5 4 - 12 %    Eosinophils Relative 0 0 - 6 %    Basophils Relative 0 0 - 1 %    Neutrophils Absolute 8 89 (H) 1 85 - 7 62 Thousands/µL    Lymphocytes Absolute 0 80 0 60 - 4 47 Thousands/µL    Monocytes Absolute 0 55 0 17 - 1 22 Thousand/µL    Eosinophils Absolute 0 04 0 00 - 0 61 Thousand/µL    Basophils Absolute 0 03 0 00 - 0 10 Thousands/µL   Basic metabolic panel    Collection Time: 05/13/18  8:24 PM   Result Value Ref Range    Sodium 136 136 - 145 mmol/L    Potassium 4 4 3 5 - 5 3 mmol/L    Chloride 101 100 - 108 mmol/L    CO2 25 21 - 32 mmol/L    Anion Gap 10 4 - 13 mmol/L    BUN 11 5 - 25 mg/dL    Creatinine 1 04 0 60 - 1 30 mg/dL    Glucose 103 65 - 140 mg/dL    Calcium 9 6 8 3 - 10 1 mg/dL    eGFR 98 ml/min/1 73sq m   Hepatic function panel    Collection Time: 05/13/18  8:24 PM   Result Value Ref Range    Total Bilirubin 0 80 0 20 - 1 00 mg/dL    Bilirubin, Direct 0 09 0 00 - 0 20 mg/dL    Alkaline Phosphatase 70 46 - 116 U/L    AST 40 5 - 45 U/L     (H) 12 - 78 U/L    Total Protein 8 3 (H) 6 4 - 8 2 g/dL    Albumin 4 2 3 5 - 5 0 g/dL   TSH    Collection Time: 05/13/18  8:24 PM   Result Value Ref Range    TSH 3RD GENERATON 0 763 0 358 - 3 740 uIU/mL   ECG 12 lead    Collection Time: 05/13/18  8:39 PM   Result Value Ref Range    Ventricular Rate 101 BPM    Atrial Rate 101 BPM    [place] Interval 138 ms    QRSD Interval 88 ms    QT Interval 342 ms    QTC Interval 443 ms    P [place] 51 degrees    QRS Axis 46 degrees    T Wave Axis 40 degrees   Ceruloplasmin    Collection Time: 05/16/18  2:15 PM   Result Value Ref Range    Ceruloplasmin 28 4 16 0 - 31 0 mg/dL   Chronic Hepatitis Panel    Collection Time: 05/16/18  2:15 PM   Result Value Ref Range    Hepatitis B Surface Ag Non-reactive Non-reactive, NonReactive - Confirmed    Hepatitis C Ab Non-reactive Non-reactive    Hep B C IgM Non-reactive Non-reactive    Hep B Core Total Ab Non-reactive Non-reactive   MYCHAL Screen w/ Reflex to Titer/Pattern    Collection Time: 05/16/18  2:15 PM   Result Value Ref Range    MYCHAL Negative Negative   Anti-smooth muscle antibody, IgG    Collection Time: 05/16/18  2:15 PM   Result Value Ref Range    Smooth Muscle Ab 6 0 - 19 Units   Antimitochondrial antibody    Collection Time: 05/16/18  2:15 PM   Result Value Ref Range    Mitochondrial Ab 2 5 0 0 - 20 0 Units   Iron Saturation %    Collection Time: 05/16/18  2:15 PM   Result Value Ref Range    Iron Saturation 18 %    TIBC 327 250 - 450 ug/dL    Iron 58 (L) 65 - 175 ug/dL   Ferritin    Collection Time: 05/16/18  2:15 PM   Result Value Ref Range    Ferritin 268 8 - 388 ng/mL       The following portions of the patient's history were reviewed and updated as appropriate: allergies, current medications, past family history, past medical history, past social history, past surgical history and problem list      Review of Systems   Constitutional: Negative for appetite change, chills, diaphoresis, fatigue, fever and unexpected weight change  HENT: Negative for congestion, hearing loss and rhinorrhea  Eyes: Negative for visual disturbance  Respiratory: Negative for cough, chest tightness, shortness of breath and wheezing      Cardiovascular: Negative for chest pain, palpitations and leg swelling  Gastrointestinal: Negative for abdominal pain and blood in stool  Endocrine: Negative for cold intolerance, heat intolerance, polydipsia and polyuria  Genitourinary: Negative for difficulty urinating, dysuria, frequency and urgency  Musculoskeletal: Negative for arthralgias and myalgias  Skin: Positive for rash  Neurological: Negative for dizziness, weakness, light-headedness and headaches  Hematological: Does not bruise/bleed easily  Psychiatric/Behavioral: Negative for dysphoric mood and sleep disturbance  Objective:      Vitals:    06/15/18 1058   BP: 120/82   Pulse: 70   SpO2: 98%          Physical Exam   Constitutional: He is oriented to person, place, and time  He appears well-developed and well-nourished  HENT:   Head: Normocephalic and atraumatic  Nose: Nose normal    Mouth/Throat: Oropharynx is clear and moist    Eyes: Conjunctivae and EOM are normal  Pupils are equal, round, and reactive to light  No scleral icterus  Neck: Normal range of motion  Neck supple  No JVD present  No tracheal deviation present  No thyromegaly present  Cardiovascular: Normal rate, regular rhythm and intact distal pulses  Exam reveals no gallop and no friction rub  No murmur heard  Pulmonary/Chest: Effort normal and breath sounds normal  No respiratory distress  He has no wheezes  He has no rales  Abdominal: Soft  Bowel sounds are normal  He exhibits no distension and no mass  There is no tenderness  There is no rebound and no guarding  Musculoskeletal: He exhibits no edema or tenderness  Lymphadenopathy:     He has no cervical adenopathy  Neurological: He is alert and oriented to person, place, and time  No cranial nerve deficit  Skin: Skin is warm and dry  Rash noted  No erythema  Mildly erythematous intertriginous inguinal region bilaterally   Psychiatric: He has a normal mood and affect   His behavior is normal  Judgment and thought content normal

## 2018-06-26 ENCOUNTER — TELEPHONE (OUTPATIENT)
Dept: INTERNAL MEDICINE CLINIC | Facility: CLINIC | Age: 28
End: 2018-06-26

## 2018-06-26 ENCOUNTER — OFFICE VISIT (OUTPATIENT)
Dept: INTERNAL MEDICINE CLINIC | Facility: CLINIC | Age: 28
End: 2018-06-26
Payer: MEDICARE

## 2018-06-26 VITALS
WEIGHT: 207.6 LBS | SYSTOLIC BLOOD PRESSURE: 110 MMHG | DIASTOLIC BLOOD PRESSURE: 78 MMHG | BODY MASS INDEX: 29.06 KG/M2 | HEART RATE: 100 BPM | HEIGHT: 71 IN

## 2018-06-26 DIAGNOSIS — F20.9 SCHIZOPHRENIA, UNSPECIFIED TYPE (HCC): Primary | ICD-10-CM

## 2018-06-26 PROCEDURE — 99213 OFFICE O/P EST LOW 20 MIN: CPT | Performed by: NURSE PRACTITIONER

## 2018-06-26 NOTE — PATIENT INSTRUCTIONS
Patient needs form completed for his  for his history of schizophrenia  Unfortunately we do not have the form we have contacted his  will completed once we have obtained it

## 2018-06-26 NOTE — PROGRESS NOTES
Assessment/Plan:    There are no Patient Instructions on file for this visit  There are no diagnoses linked to this encounter       Subjective:      Patient ID: Germán Echols is a 33 y o  male    Patient needs form completed  Was just here for routine visit  No new concerns          Current Outpatient Prescriptions:     clotrimazole-betamethasone (LOTRISONE) 1-0 05 % cream, Apply to affected areas in inguinal region twice daily as needed, Disp: 30 g, Rfl: 0    OLANZapine (ZyPREXA) 15 mg tablet, Take 15 mg by mouth daily at bedtime, Disp: , Rfl:     ranitidine (ZANTAC) 150 mg tablet, Take 1 tablet (150 mg total) by mouth 2 (two) times a day, Disp: 60 tablet, Rfl: 3    Recent Results (from the past 1008 hour(s))   Ceruloplasmin    Collection Time: 05/16/18  2:15 PM   Result Value Ref Range    Ceruloplasmin 28 4 16 0 - 31 0 mg/dL   Chronic Hepatitis Panel    Collection Time: 05/16/18  2:15 PM   Result Value Ref Range    Hepatitis B Surface Ag Non-reactive Non-reactive, NonReactive - Confirmed    Hepatitis C Ab Non-reactive Non-reactive    Hep B C IgM Non-reactive Non-reactive    Hep B Core Total Ab Non-reactive Non-reactive   MYCHAL Screen w/ Reflex to Titer/Pattern    Collection Time: 05/16/18  2:15 PM   Result Value Ref Range    MYCHAL Negative Negative   Anti-smooth muscle antibody, IgG    Collection Time: 05/16/18  2:15 PM   Result Value Ref Range    Smooth Muscle Ab 6 0 - 19 Units   Antimitochondrial antibody    Collection Time: 05/16/18  2:15 PM   Result Value Ref Range    Mitochondrial Ab 2 5 0 0 - 20 0 Units   Iron Saturation %    Collection Time: 05/16/18  2:15 PM   Result Value Ref Range    Iron Saturation 18 %    TIBC 327 250 - 450 ug/dL    Iron 58 (L) 65 - 175 ug/dL   Ferritin    Collection Time: 05/16/18  2:15 PM   Result Value Ref Range    Ferritin 268 8 - 388 ng/mL       The following portions of the patient's history were reviewed and updated as appropriate: allergies, current medications, past family history, past medical history, past social history, past surgical history and problem list      Review of Systems   Constitutional: Negative for appetite change, chills, diaphoresis, fatigue, fever and unexpected weight change  HENT: Negative for postnasal drip and sneezing  Eyes: Negative for visual disturbance  Respiratory: Negative for chest tightness and shortness of breath  Cardiovascular: Negative for chest pain, palpitations and leg swelling  Gastrointestinal: Negative for abdominal pain and blood in stool  Endocrine: Negative for cold intolerance, heat intolerance, polydipsia, polyphagia and polyuria  Genitourinary: Negative for difficulty urinating, dysuria, frequency and urgency  Musculoskeletal: Negative for arthralgias and myalgias  Skin: Negative for rash and wound  Neurological: Negative for dizziness, weakness, light-headedness and headaches  Hematological: Negative for adenopathy  Psychiatric/Behavioral: Negative for confusion, dysphoric mood and sleep disturbance  The patient is not nervous/anxious  Objective:      Vitals:    06/26/18 1346   BP: 110/78   Pulse: (!) 110          Physical Exam   Constitutional: He is oriented to person, place, and time  He appears well-developed  No distress  HENT:   Head: Normocephalic and atraumatic  Nose: Nose normal    Mouth/Throat: Oropharynx is clear and moist    Eyes: Conjunctivae and EOM are normal  Pupils are equal, round, and reactive to light  Neck: Normal range of motion  Neck supple  No JVD present  No tracheal deviation present  No thyromegaly present  Cardiovascular: Normal rate, regular rhythm, normal heart sounds and intact distal pulses  Exam reveals no gallop and no friction rub  No murmur heard  Pulmonary/Chest: Effort normal and breath sounds normal  No respiratory distress  He has no wheezes  He has no rales  Abdominal: Soft  Bowel sounds are normal  He exhibits no distension   There is no tenderness  Musculoskeletal: Normal range of motion  He exhibits no edema  Lymphadenopathy:     He has no cervical adenopathy  Neurological: He is alert and oriented to person, place, and time  No cranial nerve deficit  Skin: Skin is warm and dry  No rash noted  He is not diaphoretic  Psychiatric: He has a normal mood and affect   His behavior is normal  Judgment and thought content normal

## 2018-06-29 ENCOUNTER — TELEPHONE (OUTPATIENT)
Dept: INTERNAL MEDICINE CLINIC | Facility: CLINIC | Age: 28
End: 2018-06-29

## 2018-06-29 NOTE — TELEPHONE ENCOUNTER
Cream was sent on 6/15/18 and ranitidine was sent 5/17/18 w/ refills and both were received by pharm    I tried calling pt to check w/ pharm but call is restricted and cant be completed

## 2018-06-30 DIAGNOSIS — B35.6 JOCK ITCH: ICD-10-CM

## 2018-07-01 RX ORDER — CLOTRIMAZOLE AND BETAMETHASONE DIPROPIONATE 10; .64 MG/G; MG/G
CREAM TOPICAL
Qty: 30 G | Refills: 0 | Status: SHIPPED | OUTPATIENT
Start: 2018-07-01 | End: 2018-08-31 | Stop reason: HOSPADM

## 2018-08-14 ENCOUNTER — OFFICE VISIT (OUTPATIENT)
Dept: INTERNAL MEDICINE CLINIC | Facility: CLINIC | Age: 28
End: 2018-08-14
Payer: MEDICARE

## 2018-08-14 VITALS
HEART RATE: 88 BPM | DIASTOLIC BLOOD PRESSURE: 78 MMHG | SYSTOLIC BLOOD PRESSURE: 118 MMHG | BODY MASS INDEX: 28.98 KG/M2 | HEIGHT: 71 IN | RESPIRATION RATE: 14 BRPM | WEIGHT: 207 LBS

## 2018-08-14 DIAGNOSIS — L21.9 SEBORRHEIC DERMATITIS: Primary | ICD-10-CM

## 2018-08-14 PROCEDURE — 99213 OFFICE O/P EST LOW 20 MIN: CPT | Performed by: INTERNAL MEDICINE

## 2018-08-14 RX ORDER — TRIAMCINOLONE ACETONIDE 5 MG/G
CREAM TOPICAL
Qty: 30 G | Refills: 1 | Status: SHIPPED | OUTPATIENT
Start: 2018-08-14 | End: 2018-10-03

## 2018-08-14 RX ORDER — DESONIDE 0.5 MG/G
CREAM TOPICAL
Qty: 30 G | Refills: 0 | Status: SHIPPED | OUTPATIENT
Start: 2018-08-14 | End: 2018-10-03

## 2018-08-14 NOTE — PATIENT INSTRUCTIONS
Characteristics and distribution of rash most consistent with seborrheic dermatitis  I discussed with patient etiology is unclear, often this is responsive to topical steroids but occasionally topical or oral antifungals such as ketoconazole are needed  I will initiate desonide to be used twice daily on the face as needed and triamcinolone to be used on the chest   The groin area does not appear to require any treatment at this time  Refer to Dermatology as this problem tends to wax and wane and we need to assure optimal treatment

## 2018-08-14 NOTE — PROGRESS NOTES
Assessment/Plan:    Patient Instructions    Characteristics and distribution of rash most consistent with seborrheic dermatitis  I discussed with patient etiology is unclear, often this is responsive to topical steroids but occasionally topical or oral antifungals such as ketoconazole are needed  I will initiate desonide to be used twice daily on the face as needed and triamcinolone to be used on the chest   The groin area does not appear to require any treatment at this time  Refer to Dermatology as this problem tends to wax and wane and we need to assure optimal treatment  Diagnoses and all orders for this visit:    Seborrheic dermatitis  -     desonide (DESOWEN) 0 05 % cream; Apply topically to affected areas of the face twice daily as needed  -     triamcinolone (KENALOG) 0 5 % cream; Apply topically to lesions on chest 2-3 times daily as needed  -     Ambulatory referral to Dermatology; Future         Subjective:      Patient ID: Anthony Murguia is a 33 y o  male    Dated presents today for re-evaluation of rash  The jock itch seems to have improved dramatically with the Lotrisone though he has some persistent pigment change in the intertriginous folds  He has now developed a rash on his chest and nasal labial folds as well as forehead  The rash has been present for approximately 2 months for the chest but the face has been off and on for about a year            Current Outpatient Prescriptions:     OLANZapine (ZyPREXA) 15 mg tablet, Take 15 mg by mouth daily at bedtime, Disp: , Rfl:     ranitidine (ZANTAC) 150 mg tablet, Take 1 tablet (150 mg total) by mouth 2 (two) times a day, Disp: 60 tablet, Rfl: 3    clotrimazole-betamethasone (LOTRISONE) 1-0 05 % cream, APPLY TO AFFECTED AREAS IN INGUINAL REGION TWICE DAILY AS NEEDED (Patient not taking: Reported on 8/14/2018), Disp: 30 g, Rfl: 0    desonide (DESOWEN) 0 05 % cream, Apply topically to affected areas of the face twice daily as needed, Disp: 30 g, Rfl: 0    triamcinolone (KENALOG) 0 5 % cream, Apply topically to lesions on chest 2-3 times daily as needed, Disp: 30 g, Rfl: 1    No results found for this or any previous visit (from the past 1008 hour(s))  The following portions of the patient's history were reviewed and updated as appropriate: allergies, current medications, past family history, past medical history, past social history, past surgical history and problem list      Review of Systems   Constitutional: Negative for appetite change, chills, diaphoresis, fatigue, fever and unexpected weight change  HENT: Negative for congestion, hearing loss and rhinorrhea  Eyes: Negative for visual disturbance  Respiratory: Negative for cough, chest tightness, shortness of breath and wheezing  Cardiovascular: Negative for chest pain, palpitations and leg swelling  Gastrointestinal: Negative for abdominal pain and blood in stool  Endocrine: Negative for cold intolerance, heat intolerance, polydipsia and polyuria  Genitourinary: Negative for difficulty urinating, dysuria, frequency and urgency  Musculoskeletal: Negative for arthralgias and myalgias  Skin: Positive for rash  Neurological: Negative for dizziness, weakness, light-headedness and headaches  Hematological: Does not bruise/bleed easily  Psychiatric/Behavioral: Negative for dysphoric mood and sleep disturbance  Objective:      Vitals:    08/14/18 1433   BP: 118/78   Pulse: 88   Resp: 14          Physical Exam   Constitutional: He appears well-developed and well-nourished  Skin:   Mild erythema without scaling in the nasolabial area and mustache area, erythema without scaling on the forehead, over the sternum and under the breasts there is a macular erythematous rash with coalescing macular lesions approximately 3 mm in diameter,  In the intertriginous areas of the groin there is no appreciable erythema or hyperpigmentation

## 2018-08-17 ENCOUNTER — TELEPHONE (OUTPATIENT)
Dept: INTERNAL MEDICINE CLINIC | Facility: CLINIC | Age: 28
End: 2018-08-17

## 2018-08-17 NOTE — TELEPHONE ENCOUNTER
Pt was told by his sandoval that he would need a preauth for the cream desonide 0 05 cream from his ins please call pt as to this issues [phone]

## 2018-08-17 NOTE — TELEPHONE ENCOUNTER
[facility] called   DESONIDE was called in for patient and it is not covered    Pharmacy wants to know if Marielle will call something else in

## 2018-08-24 ENCOUNTER — TELEPHONE (OUTPATIENT)
Dept: INTERNAL MEDICINE CLINIC | Facility: CLINIC | Age: 28
End: 2018-08-24

## 2018-08-24 NOTE — TELEPHONE ENCOUNTER
We will need to know what equivelant to desonide the insurance will cover and or try to get him in with derm

## 2018-08-24 NOTE — TELEPHONE ENCOUNTER
Pt was here for an appointment with Dr Lucy Farr about 2 weeks ago for a rash on his face  He was prescribed desonide but was not covered by insurance  He has been using OTC cortisone cream instead  States that when he uses the cortisone cream, the rash will look like it's healing for about a day then come back  Wants to know if there is another prescription cream Dr Lucy Farr recommends that would be covered   Please advise     [phone]

## 2018-08-29 ENCOUNTER — HOSPITAL ENCOUNTER (INPATIENT)
Facility: HOSPITAL | Age: 28
LOS: 2 days | Discharge: HOME | DRG: 603 | End: 2018-08-31
Attending: EMERGENCY MEDICINE | Admitting: INTERNAL MEDICINE
Payer: MEDICARE

## 2018-08-29 DIAGNOSIS — D72.829 LEUKOCYTOSIS: ICD-10-CM

## 2018-08-29 DIAGNOSIS — L03.115 CELLULITIS OF RIGHT FOOT: Primary | ICD-10-CM

## 2018-08-29 DIAGNOSIS — Z78.9 FAILURE OF OUTPATIENT TREATMENT: ICD-10-CM

## 2018-08-29 DIAGNOSIS — B35.3 TINEA PEDIS OF RIGHT FOOT: ICD-10-CM

## 2018-08-29 DIAGNOSIS — L03.115 CELLULITIS OF RIGHT LOWER EXTREMITY: ICD-10-CM

## 2018-08-29 PROBLEM — M79.89 TOE SWELLING: Status: ACTIVE | Noted: 2018-08-29

## 2018-08-29 PROBLEM — L03.119 CELLULITIS OF EXTREMITY: Status: ACTIVE | Noted: 2018-08-29

## 2018-08-29 PROBLEM — F99 PSYCHIATRIC DISORDER: Status: ACTIVE | Noted: 2018-08-29

## 2018-08-29 LAB
ALBUMIN SERPL BCP-MCNC: 4.5 G/DL (ref 3.5–5)
ALP SERPL-CCNC: 82 U/L (ref 46–116)
ALT SERPL W P-5'-P-CCNC: 97 U/L (ref 12–78)
ANION GAP SERPL CALCULATED.3IONS-SCNC: 8 MMOL/L (ref 4–13)
APTT PPP: 28 SECONDS (ref 24–36)
AST SERPL W P-5'-P-CCNC: 33 U/L (ref 5–45)
BASOPHILS # BLD AUTO: 0.04 THOUSANDS/ΜL (ref 0–0.1)
BASOPHILS NFR BLD AUTO: 0 % (ref 0–1)
BILIRUB SERPL-MCNC: 0.9 MG/DL (ref 0.2–1)
BUN SERPL-MCNC: 7 MG/DL (ref 5–25)
CALCIUM SERPL-MCNC: 9.9 MG/DL (ref 8.3–10.1)
CHLORIDE SERPL-SCNC: 98 MMOL/L (ref 100–108)
CO2 SERPL-SCNC: 29 MMOL/L (ref 21–32)
CREAT SERPL-MCNC: 0.96 MG/DL (ref 0.6–1.3)
EOSINOPHIL # BLD AUTO: 0.02 THOUSAND/ΜL (ref 0–0.61)
EOSINOPHIL NFR BLD AUTO: 0 % (ref 0–6)
ERYTHROCYTE [DISTWIDTH] IN BLOOD BY AUTOMATED COUNT: 12.9 % (ref 11.6–15.1)
GFR SERPL CREATININE-BSD FRML MDRD: 108 ML/MIN/1.73SQ M
GLUCOSE SERPL-MCNC: 104 MG/DL (ref 65–140)
HCT VFR BLD AUTO: 46.1 % (ref 36.5–49.3)
HGB BLD-MCNC: 15.8 G/DL (ref 12–17)
IMM GRANULOCYTES # BLD AUTO: 0.06 THOUSAND/UL (ref 0–0.2)
IMM GRANULOCYTES NFR BLD AUTO: 1 % (ref 0–2)
INR PPP: 1.04 (ref 0.86–1.17)
LYMPHOCYTES # BLD AUTO: 2.2 THOUSANDS/ΜL (ref 0.6–4.47)
LYMPHOCYTES NFR BLD AUTO: 17 % (ref 14–44)
MCH RBC QN AUTO: 30.2 PG (ref 26.8–34.3)
MCHC RBC AUTO-ENTMCNC: 34.3 G/DL (ref 31.4–37.4)
MCV RBC AUTO: 88 FL (ref 82–98)
MONOCYTES # BLD AUTO: 0.97 THOUSAND/ΜL (ref 0.17–1.22)
MONOCYTES NFR BLD AUTO: 8 % (ref 4–12)
NEUTROPHILS # BLD AUTO: 9.73 THOUSANDS/ΜL (ref 1.85–7.62)
NEUTS SEG NFR BLD AUTO: 74 % (ref 43–75)
NRBC BLD AUTO-RTO: 0 /100 WBCS
PLATELET # BLD AUTO: 267 THOUSANDS/UL (ref 149–390)
PMV BLD AUTO: 9 FL (ref 8.9–12.7)
POTASSIUM SERPL-SCNC: 3.9 MMOL/L (ref 3.5–5.3)
PROT SERPL-MCNC: 9.1 G/DL (ref 6.4–8.2)
PROTHROMBIN TIME: 13.5 SECONDS (ref 11.8–14.2)
RBC # BLD AUTO: 5.23 MILLION/UL (ref 3.88–5.62)
SODIUM SERPL-SCNC: 135 MMOL/L (ref 136–145)
WBC # BLD AUTO: 13.02 THOUSAND/UL (ref 4.31–10.16)

## 2018-08-29 PROCEDURE — 85730 THROMBOPLASTIN TIME PARTIAL: CPT | Performed by: EMERGENCY MEDICINE

## 2018-08-29 PROCEDURE — 96375 TX/PRO/DX INJ NEW DRUG ADDON: CPT

## 2018-08-29 PROCEDURE — 85610 PROTHROMBIN TIME: CPT | Performed by: EMERGENCY MEDICINE

## 2018-08-29 PROCEDURE — 36415 COLL VENOUS BLD VENIPUNCTURE: CPT | Performed by: EMERGENCY MEDICINE

## 2018-08-29 PROCEDURE — 99284 EMERGENCY DEPT VISIT MOD MDM: CPT

## 2018-08-29 PROCEDURE — 85025 COMPLETE CBC W/AUTO DIFF WBC: CPT | Performed by: EMERGENCY MEDICINE

## 2018-08-29 PROCEDURE — 87040 BLOOD CULTURE FOR BACTERIA: CPT | Performed by: EMERGENCY MEDICINE

## 2018-08-29 PROCEDURE — 80053 COMPREHEN METABOLIC PANEL: CPT | Performed by: EMERGENCY MEDICINE

## 2018-08-29 PROCEDURE — 99223 1ST HOSP IP/OBS HIGH 75: CPT | Performed by: NURSE PRACTITIONER

## 2018-08-29 PROCEDURE — 96365 THER/PROPH/DIAG IV INF INIT: CPT

## 2018-08-29 RX ORDER — KETOROLAC TROMETHAMINE 30 MG/ML
15 INJECTION, SOLUTION INTRAMUSCULAR; INTRAVENOUS ONCE
Status: COMPLETED | OUTPATIENT
Start: 2018-08-29 | End: 2018-08-29

## 2018-08-29 RX ORDER — ACETAMINOPHEN 325 MG/1
650 TABLET ORAL EVERY 6 HOURS PRN
Status: DISCONTINUED | OUTPATIENT
Start: 2018-08-29 | End: 2018-08-31 | Stop reason: HOSPADM

## 2018-08-29 RX ORDER — ONDANSETRON 2 MG/ML
4 INJECTION INTRAMUSCULAR; INTRAVENOUS EVERY 6 HOURS PRN
Status: DISCONTINUED | OUTPATIENT
Start: 2018-08-29 | End: 2018-08-31 | Stop reason: HOSPADM

## 2018-08-29 RX ADMIN — VANCOMYCIN HYDROCHLORIDE 1250 MG: 1 INJECTION, POWDER, LYOPHILIZED, FOR SOLUTION INTRAVENOUS at 21:47

## 2018-08-29 RX ADMIN — KETOROLAC TROMETHAMINE 15 MG: 30 INJECTION, SOLUTION INTRAMUSCULAR at 20:44

## 2018-08-29 RX ADMIN — SODIUM CHLORIDE 1000 ML: 0.9 INJECTION, SOLUTION INTRAVENOUS at 20:47

## 2018-08-29 RX ADMIN — CEFAZOLIN SODIUM 2000 MG: 2 SOLUTION INTRAVENOUS at 20:47

## 2018-08-30 ENCOUNTER — APPOINTMENT (INPATIENT)
Dept: RADIOLOGY | Facility: HOSPITAL | Age: 28
DRG: 603 | End: 2018-08-30
Payer: MEDICARE

## 2018-08-30 PROBLEM — B35.3 TINEA PEDIS OF RIGHT FOOT: Status: ACTIVE | Noted: 2018-08-30

## 2018-08-30 LAB
ANION GAP SERPL CALCULATED.3IONS-SCNC: 9 MMOL/L (ref 4–13)
BASOPHILS # BLD AUTO: 0.03 THOUSANDS/ΜL (ref 0–0.1)
BASOPHILS NFR BLD AUTO: 0 % (ref 0–1)
BUN SERPL-MCNC: 11 MG/DL (ref 5–25)
CALCIUM SERPL-MCNC: 9 MG/DL (ref 8.3–10.1)
CHLORIDE SERPL-SCNC: 107 MMOL/L (ref 100–108)
CO2 SERPL-SCNC: 25 MMOL/L (ref 21–32)
CREAT SERPL-MCNC: 1.09 MG/DL (ref 0.6–1.3)
EOSINOPHIL # BLD AUTO: 0.1 THOUSAND/ΜL (ref 0–0.61)
EOSINOPHIL NFR BLD AUTO: 1 % (ref 0–6)
ERYTHROCYTE [DISTWIDTH] IN BLOOD BY AUTOMATED COUNT: 12.9 % (ref 11.6–15.1)
EST. AVERAGE GLUCOSE BLD GHB EST-MCNC: 103 MG/DL
GFR SERPL CREATININE-BSD FRML MDRD: 93 ML/MIN/1.73SQ M
GLUCOSE SERPL-MCNC: 102 MG/DL (ref 65–140)
HBA1C MFR BLD: 5.2 % (ref 4.2–6.3)
HCT VFR BLD AUTO: 40.8 % (ref 36.5–49.3)
HGB BLD-MCNC: 13.8 G/DL (ref 12–17)
IMM GRANULOCYTES # BLD AUTO: 0.06 THOUSAND/UL (ref 0–0.2)
IMM GRANULOCYTES NFR BLD AUTO: 1 % (ref 0–2)
LYMPHOCYTES # BLD AUTO: 2.11 THOUSANDS/ΜL (ref 0.6–4.47)
LYMPHOCYTES NFR BLD AUTO: 23 % (ref 14–44)
MCH RBC QN AUTO: 30.3 PG (ref 26.8–34.3)
MCHC RBC AUTO-ENTMCNC: 33.8 G/DL (ref 31.4–37.4)
MCV RBC AUTO: 90 FL (ref 82–98)
MONOCYTES # BLD AUTO: 0.85 THOUSAND/ΜL (ref 0.17–1.22)
MONOCYTES NFR BLD AUTO: 9 % (ref 4–12)
NEUTROPHILS # BLD AUTO: 6.06 THOUSANDS/ΜL (ref 1.85–7.62)
NEUTS SEG NFR BLD AUTO: 66 % (ref 43–75)
NRBC BLD AUTO-RTO: 0 /100 WBCS
PLATELET # BLD AUTO: 226 THOUSANDS/UL (ref 149–390)
PMV BLD AUTO: 9.2 FL (ref 8.9–12.7)
POTASSIUM SERPL-SCNC: 4.3 MMOL/L (ref 3.5–5.3)
RBC # BLD AUTO: 4.56 MILLION/UL (ref 3.88–5.62)
SODIUM SERPL-SCNC: 141 MMOL/L (ref 136–145)
WBC # BLD AUTO: 9.21 THOUSAND/UL (ref 4.31–10.16)

## 2018-08-30 PROCEDURE — 80048 BASIC METABOLIC PNL TOTAL CA: CPT | Performed by: NURSE PRACTITIONER

## 2018-08-30 PROCEDURE — 83036 HEMOGLOBIN GLYCOSYLATED A1C: CPT | Performed by: INTERNAL MEDICINE

## 2018-08-30 PROCEDURE — 85025 COMPLETE CBC W/AUTO DIFF WBC: CPT | Performed by: NURSE PRACTITIONER

## 2018-08-30 PROCEDURE — 99232 SBSQ HOSP IP/OBS MODERATE 35: CPT | Performed by: INTERNAL MEDICINE

## 2018-08-30 PROCEDURE — 73660 X-RAY EXAM OF TOE(S): CPT

## 2018-08-30 RX ORDER — CLOTRIMAZOLE AND BETAMETHASONE DIPROPIONATE 10; .64 MG/G; MG/G
CREAM TOPICAL 2 TIMES DAILY
Status: DISCONTINUED | OUTPATIENT
Start: 2018-08-30 | End: 2018-08-31 | Stop reason: HOSPADM

## 2018-08-30 RX ORDER — FAMOTIDINE 20 MG/1
20 TABLET, FILM COATED ORAL DAILY
Status: DISCONTINUED | OUTPATIENT
Start: 2018-08-30 | End: 2018-08-31 | Stop reason: HOSPADM

## 2018-08-30 RX ADMIN — OLANZAPINE 15 MG: 10 TABLET, FILM COATED ORAL at 01:17

## 2018-08-30 RX ADMIN — FAMOTIDINE 20 MG: 20 TABLET ORAL at 09:11

## 2018-08-30 RX ADMIN — CLOTRIMAZOLE AND BETAMETHASONE DIPROPIONATE: 10; .5 CREAM TOPICAL at 09:16

## 2018-08-30 RX ADMIN — CEFAZOLIN SODIUM 2000 MG: 2 SOLUTION INTRAVENOUS at 09:11

## 2018-08-30 RX ADMIN — CLOTRIMAZOLE AND BETAMETHASONE DIPROPIONATE: 10; .5 CREAM TOPICAL at 19:00

## 2018-08-30 RX ADMIN — OLANZAPINE 15 MG: 10 TABLET, FILM COATED ORAL at 21:24

## 2018-08-30 RX ADMIN — CEFAZOLIN SODIUM 2000 MG: 2 SOLUTION INTRAVENOUS at 21:24

## 2018-08-30 NOTE — CONSULTS
Consultation - [address] [facility] [place],[address] 33 y o  male MRN: [id number]  Unit/Bed#: -01 Encounter: [id number]    Assessment/Plan     Assessment:  1) Tinea Pedis  2) Cellulitis right foot third digit    Plan:  1) Betadine paint to toes daily interspaces  2) Recommend oral keflex and ciprofloxacin for pseudomonas and staph coverage  3) Topical clotrimazole after 1 week betadine pain to interspaces       History of Present Illness   HPI:  Orrin Castleman is a 33 y o  male who presents with ID macerations to all interspaces and cellulitis and swelling to the right foot third digit  Consults    Review of Systems   No f/c/n/v/s  No headache  No earache  No post nasal drip  No tinnitus  No diplopia  No sore throat  No CP  No SOB  No GERD  No dysuria  No hallucinations     Historical Information   Past Medical History:   Diagnosis Date    GERD (gastroesophageal reflux disease)     H  pylori infection     Hematochezia     Hypercholesteremia     Hyperlipidemia     Psychosis     Last Assessed:  11/19/15    Schizophrenia [facility])      Past Surgical History:   Procedure Laterality Date    COLONOSCOPY      MN COLONOSCOPY FLX DX W/COLLJ SPEC WHEN PFRMD N/A 9/27/2017    Procedure: COLONOSCOPY;  Surgeon: Ness Mccauley MD;  Location: [facility];   Service: Gastroenterology     Social History   History   Alcohol Use    Yes     Comment: occ     History   Drug Use No     History   Smoking Status    Former Smoker   Smokeless Tobacco    Never Used     Comment: Per [employer]:  Never a smoker     Family History:   Family History   Problem Relation Age of Onset    Hypertension Family     Cancer Family        Meds/Allergies   all current active meds have been reviewed, current meds:   Current Facility-Administered Medications   Medication Dose Route Frequency    acetaminophen (TYLENOL) tablet 650 mg  650 mg Oral Q6H PRN    ceFAZolin (ANCEF) IVPB (premix) 2,000 mg  2,000 mg Intravenous Q12H    clotrimazole-betamethasone (LOTRISONE) 1-0 05 % cream   Topical BID    famotidine (PEPCID) tablet 20 mg  20 mg Oral Daily    OLANZapine (ZyPREXA) tablet 15 mg  15 mg Oral HS    ondansetron (ZOFRAN) injection 4 mg  4 mg Intravenous Q6H PRN    and PTA meds:   Prior to Admission Medications   Prescriptions Last Dose Informant Patient Reported? Taking? OLANZapine (ZyPREXA) 15 mg tablet  Self Yes No   Sig: Take 15 mg by mouth daily at bedtime   clotrimazole-betamethasone (LOTRISONE) 1-0 05 % cream  Self No No   Sig: APPLY TO AFFECTED AREAS IN INGUINAL REGION TWICE DAILY AS NEEDED   Patient not taking: Reported on 8/14/2018   desonide (DESOWEN) 0 05 % cream   No No   Sig: Apply topically to affected areas of the face twice daily as needed   ranitidine (ZANTAC) 150 mg tablet  Self No No   Sig: Take 1 tablet (150 mg total) by mouth 2 (two) times a day   triamcinolone (KENALOG) 0 5 % cream   No No   Sig: Apply topically to lesions on chest 2-3 times daily as needed      Facility-Administered Medications: None     No Known Allergies    Objective   Vitals: Blood pressure 130/69, pulse 88, temperature 97 9 °F (36 6 °C), temperature source Oral, resp  rate 18, height 5' 8" (1 727 m), weight 94 9 kg (209 lb 3 5 oz), SpO2 99 %       Wounds:            Physical Exam  Pulses are palpable, pedal hair is present, POP to the right foot third digit, ID macerations to all interspaces right foot, yellow thickened elongated nails x 10, tinea to plantar feet, sensation is intact   Xray right foot toes    FINDINGS:     There is no acute fracture or dislocation      Soft tissue swelling around the base of the 2nd toe is noted      IMPRESSION:     Soft tissue swelling without an osseous abnormality

## 2018-08-30 NOTE — ED PROVIDER NOTES
History  Chief Complaint   Patient presents with    Toe Swelling     pt c/o right toe swelling  was given antibiotics on monday by urgent care and states that it has gotten more swollen and painful     43-year-old male with history of schizophrenia presenting for evaluation of toe infection  Patient states last week he developed a small sore on his right 2nd toe, he has seen evaluated urgent care prescribed Keflex and fluconazole he has been taking this as prescribed since Monday is his 3rd day of antibiotics and the pain and swelling localized to his right 2nd toe is gotten significantly worse he now has circumferential swelling of his right 2nd toe he has a wound in between the 2nd and 3rd toe now he has erythema on the entire dorsum of his right foot striking up to the distal leg  Pain is localized to his foot only is nonradiating it is atraumatic no other exacerbating remitting factors other than what is noted he notes some discomfort to this area but otherwise denies history of diabetes poor wound healing inciting incident or injury denies fevers chills or constitutional symptoms history of similar he denies headache chest pain cough shortness of breath nausea vomiting anorexia abdominal pain change in bowels or bladder joint pain swelling rashes or other associated symptoms            Prior to Admission Medications   Prescriptions Last Dose Informant Patient Reported? Taking?    OLANZapine (ZyPREXA) 15 mg tablet  Self Yes No   Sig: Take 15 mg by mouth daily at bedtime   clotrimazole-betamethasone (LOTRISONE) 1-0 05 % cream  Self No No   Sig: APPLY TO AFFECTED AREAS IN INGUINAL REGION TWICE DAILY AS NEEDED   Patient not taking: Reported on 8/14/2018   desonide (DESOWEN) 0 05 % cream   No No   Sig: Apply topically to affected areas of the face twice daily as needed   ranitidine (ZANTAC) 150 mg tablet  Self No No   Sig: Take 1 tablet (150 mg total) by mouth 2 (two) times a day   triamcinolone (KENALOG) 0 5 % cream   No No   Sig: Apply topically to lesions on chest 2-3 times daily as needed      Facility-Administered Medications: None       Past Medical History:   Diagnosis Date    GERD (gastroesophageal reflux disease)     H  pylori infection     Hematochezia     Hypercholesteremia     Hyperlipidemia     Psychosis     Last Assessed:  11/19/15    Schizophrenia [facility])        Past Surgical History:   Procedure Laterality Date    COLONOSCOPY      [place] COLONOSCOPY FLX DX W/COLLJ SPEC WHEN PFRMD N/A 9/27/2017    Procedure: COLONOSCOPY;  Surgeon: Hui Zuñiga MD;  Location: [facility]; Service: Gastroenterology       Family History   Problem Relation Age of Onset    Hypertension Family     Cancer Family      I have reviewed and agree with the history as documented  Social History   Substance Use Topics    Smoking status: Former Smoker    Smokeless tobacco: Never Used      Comment: Per [employer]:  Never a smoker    Alcohol use Yes      Comment: occ        Review of Systems   Constitutional: Negative for activity change, appetite change, chills and fever  Respiratory: Negative for cough and shortness of breath  Cardiovascular: Negative for chest pain and palpitations  Gastrointestinal: Negative for abdominal pain, diarrhea, nausea and vomiting  Endocrine: Negative for polydipsia and polyphagia  Genitourinary: Negative for dysuria, frequency and urgency  Musculoskeletal: Positive for gait problem  Negative for arthralgias, back pain and myalgias  Right foot pain   Skin: Positive for color change and wound  Negative for rash  Allergic/Immunologic: Negative for immunocompromised state  Neurological: Negative for dizziness, weakness, light-headedness, numbness and headaches  Hematological: Negative for adenopathy  Does not bruise/bleed easily  Psychiatric/Behavioral: Negative for agitation and behavioral problems  All other systems reviewed and are negative        Physical Exam  Physical Exam   Constitutional: He is oriented to person, place, and time  He appears well-developed and well-nourished  No distress  Clinically well-appearing in no acute distress, mother bedside   HENT:   Head: Normocephalic and atraumatic  Eyes: EOM are normal  Pupils are equal, round, and reactive to light  Neck: Normal range of motion  Neck supple  No tracheal deviation present  Cardiovascular: Normal rate, regular rhythm and normal heart sounds  Exam reveals no gallop and no friction rub  No murmur heard  Pulmonary/Chest: Effort normal and breath sounds normal  He has no wheezes  He has no rales  Abdominal: Soft  Bowel sounds are normal  He exhibits no distension  There is no tenderness  There is no rebound and no guarding  Musculoskeletal:   Patient's muscle skeletal exam his right 2nd toe is markedly swollen when compared to the rest of his toes this circumferentially erythematous he does have drainage between the 3rd and 4th toe, the entire dorsum of his right foot is erythematous, warm, blanchable, this extends up his anterior lateral ankle as well, no definite abscess no purulence or drainage, he has 2+ symmetric pulses without other acute ischemic infectious or inflammatory findings   Neurological: He is alert and oriented to person, place, and time  No cranial nerve deficit  He exhibits normal muscle tone  Coordination normal    Skin: Skin is warm and dry  No rash noted  Psychiatric: He has a normal mood and affect  His behavior is normal    Nursing note and vitals reviewed        Vital Signs  ED Triage Vitals [08/29/18 1900]   Temperature Pulse Respirations Blood Pressure SpO2   99 °F (37 2 °C) 89 16 126/85 99 %      Temp Source Heart Rate Source Patient Position - Orthostatic VS BP Location FiO2 (%)   Oral Monitor Sitting Left arm --      Pain Score       7           Vitals:    08/29/18 1900 08/29/18 2207 08/30/18 0700 08/30/18 1534   BP: 126/85 129/61 130/69 125/79   Pulse: 89 86 88 78   Patient Position - Orthostatic VS: Sitting Lying Lying Lying       Visual Acuity      ED Medications  Medications   ceFAZolin (ANCEF) IVPB (premix) 2,000 mg (2,000 mg Intravenous New Bag 8/30/18 0911)   ondansetron (ZOFRAN) injection 4 mg (not administered)   acetaminophen (TYLENOL) tablet 650 mg (not administered)   clotrimazole-betamethasone (LOTRISONE) 1-0 05 % cream ( Topical Given 8/30/18 0916)   OLANZapine (ZyPREXA) tablet 15 mg (15 mg Oral Given 8/30/18 0117)   famotidine (PEPCID) tablet 20 mg (20 mg Oral Given 8/30/18 0911)   sodium chloride 0 9 % bolus 1,000 mL (1,000 mL Intravenous New Bag 8/29/18 2047)   ceFAZolin (ANCEF) IVPB (premix) 2,000 mg (0 mg Intravenous Stopped 8/29/18 2121)   vancomycin (VANCOCIN) 1,250 mg in sodium chloride 0 9 % 250 mL IVPB (1,250 mg Intravenous New Bag 8/29/18 2147)   ketorolac (TORADOL) injection 15 mg (15 mg Intravenous Given 8/29/18 2044)       Diagnostic Studies  Results Reviewed     Procedure Component Value Units Date/Time    Basic metabolic panel [[id number]] Collected:  08/30/18 0507    Lab Status:  Final result Specimen:  Blood from Arm, Left Updated:  08/30/18 0549     Sodium 141 mmol/L      Potassium 4 3 mmol/L      Chloride 107 mmol/L      CO2 25 mmol/L      ANION GAP 9 mmol/L      BUN 11 mg/dL      Creatinine 1 09 mg/dL      Glucose 102 mg/dL      Calcium 9 0 mg/dL      eGFR 93 ml/min/1 73sq m     Narrative:         National Kidney Disease Education Program recommendations are as follows:  GFR calculation is accurate only with a steady state creatinine  Chronic Kidney disease less than 60 ml/min/1 73 sq  meters  Kidney failure less than 15 ml/min/1 73 sq  meters      CBC and differential [[id number]] Collected:  08/30/18 0507    Lab Status:  Final result Specimen:  Blood from Arm, Left Updated:  08/30/18 0527     WBC 9 21 Thousand/uL      RBC 4 56 Million/uL      Hemoglobin 13 8 g/dL      Hematocrit 40 8 %      MCV 90 fL      MCH 30 3 pg      MCHC 33 8 g/dL RDW 12 9 %      MPV 9 2 fL      Platelets 182 Thousands/uL      nRBC 0 /100 WBCs      Neutrophils Relative 66 %      Immat GRANS % 1 %      Lymphocytes Relative 23 %      Monocytes Relative 9 %      Eosinophils Relative 1 %      Basophils Relative 0 %      Neutrophils Absolute 6 06 Thousands/µL      Immature Grans Absolute 0 06 Thousand/uL      Lymphocytes Absolute 2 11 Thousands/µL      Monocytes Absolute 0 85 Thousand/µL      Eosinophils Absolute 0 10 Thousand/µL      Basophils Absolute 0 03 Thousands/µL     Comprehensive metabolic panel [[id number]]  (Abnormal) Collected:  08/29/18 2029    Lab Status:  Final result Specimen:  Blood from Arm, Right Updated:  08/29/18 2053     Sodium 135 (L) mmol/L      Potassium 3 9 mmol/L      Chloride 98 (L) mmol/L      CO2 29 mmol/L      ANION GAP 8 mmol/L      BUN 7 mg/dL      Creatinine 0 96 mg/dL      Glucose 104 mg/dL      Calcium 9 9 mg/dL      AST 33 U/L      ALT 97 (H) U/L      Alkaline Phosphatase 82 U/L      Total Protein 9 1 (H) g/dL      Albumin 4 5 g/dL      Total Bilirubin 0 90 mg/dL      eGFR 108 ml/min/1 73sq m     Narrative:         National Kidney Disease Education Program recommendations are as follows:  GFR calculation is accurate only with a steady state creatinine  Chronic Kidney disease less than 60 ml/min/1 73 sq  meters  Kidney failure less than 15 ml/min/1 73 sq  meters  Sada Martinez [[id number]]  (Normal) Collected:  08/29/18 2029    Lab Status:  Final result Specimen:  Blood from Arm, Right Updated:  08/29/18 2049     Protime 13 5 seconds      INR 1 04    APTT [[id number]]  (Normal) Collected:  08/29/18 2029    Lab Status:  Final result Specimen:  Blood from Arm, Right Updated:  08/29/18 2049     PTT 28 seconds     Blood culture #1 [[id number]] Collected:  08/29/18 2042    Lab Status:   In process Specimen:  Blood from Arm, Left Updated:  08/29/18 2044    CBC and differential [[id number]]  (Abnormal) Collected:  08/29/18 2029    Lab Status:  Final result Specimen:  Blood from Arm, Right Updated:  08/29/18 2037     WBC 13 02 (H) Thousand/uL      RBC 5 23 Million/uL      Hemoglobin 15 8 g/dL      Hematocrit 46 1 %      MCV 88 fL      MCH 30 2 pg      MCHC 34 3 g/dL      RDW 12 9 %      MPV 9 0 fL      Platelets 649 Thousands/uL      nRBC 0 /100 WBCs      Neutrophils Relative 74 %      Immat GRANS % 1 %      Lymphocytes Relative 17 %      Monocytes Relative 8 %      Eosinophils Relative 0 %      Basophils Relative 0 %      Neutrophils Absolute 9 73 (H) Thousands/µL      Immature Grans Absolute 0 06 Thousand/uL      Lymphocytes Absolute 2 20 Thousands/µL      Monocytes Absolute 0 97 Thousand/µL      Eosinophils Absolute 0 02 Thousand/µL      Basophils Absolute 0 04 Thousands/µL     Blood culture #2 [[id number]] Collected:  08/29/18 2029    Lab Status:   In process Specimen:  Blood from Arm, Right Updated:  08/29/18 2034                 XR toe right second min 2 views   Final Result by Gricelda Manzo MD (45/20 [id number])      Soft tissue swelling without an osseous abnormality            Workstation performed: [id number]                    Procedures  Procedures       Phone Contacts  ED Phone Contact    ED Course  ED Course as of Aug 30 1736   Wed Aug 29, 2018   2121 Patient has been on Keflex for 3 days he had progressive worsening symptoms now with erythema on the dorsum of his right foot up to the distal leg no fevers, will admit for failure of outpatient therapy will need podiatry evaluation trial of IV antibiotics patient agreeable                                MDM  Number of Diagnoses or Management Options  Cellulitis of right foot:   Failure of outpatient treatment:   Leukocytosis:   Diagnosis management comments: 42-year-old male with his history schizophrenia presenting for re-evaluation of right foot pain and swelling, started last week was seen at urgent care given Keflex and fluconazole, his progress significantly according to the patient despite being compliant with antibiotics, presents today with increasing redness on the dorsum of his foot now extending up his distal leg, no inciting incident had x-ray that was reportedly negative no fevers chills constitutional symptoms, no history of poor wound healing or diabetes, on exam here he is afebrile normal vital signs patient is clinically well appearing he does have moderate circumferential swelling of his right 2nd toe with erythema extending on the dorsum of his foot and up his distal leg, discussed at length, offered additional antibiotic coverage with trialed out patient family expressed concern significant progression in the last day or so despite currently being on antibiotics, for this reason, will check septic workup with labs, will give IV antibiotics, admit for failure of outpatient therapy of cellulitis, patient agreeable plan    CritCare Time    Disposition  Final diagnoses:   Cellulitis of right foot   Failure of outpatient treatment   Leukocytosis     Time reflects when diagnosis was documented in both MDM as applicable and the Disposition within this note     Time User Action Codes Description Comment    8/29/2018  9:31 PM Tejal Solomon Add [L03 115] Cellulitis of right foot     8/29/2018  9:31 PM Bartolome COHEN Add [Z78 9] Failure of outpatient treatment     8/29/2018  9:31 PM Tejal Solomon Add [D72 829] Leukocytosis       ED Disposition     ED Disposition Condition Comment    Admit  Case was discussed with Herline and the patient's admission status was agreed to be Admission Status: inpatient status to the service of Dr Kary Arthur           Follow-up Information    None         Current Discharge Medication List      CONTINUE these medications which have NOT CHANGED    Details   clotrimazole-betamethasone (LOTRISONE) 1-0 05 % cream APPLY TO AFFECTED AREAS IN INGUINAL REGION TWICE DAILY AS NEEDED  Qty: 30 g, Refills: 0    Associated Diagnoses: Jock itch      desonide (DESOWEN) 0 05 % cream Apply topically to affected areas of the face twice daily as needed  Qty: 30 g, Refills: 0    Associated Diagnoses: Seborrheic dermatitis      OLANZapine (ZyPREXA) 15 mg tablet Take 15 mg by mouth daily at bedtime      ranitidine (ZANTAC) 150 mg tablet Take 1 tablet (150 mg total) by mouth 2 (two) times a day  Qty: 60 tablet, Refills: 3    Associated Diagnoses: Gastroesophageal reflux disease without esophagitis      triamcinolone (KENALOG) 0 5 % cream Apply topically to lesions on chest 2-3 times daily as needed  Qty: 30 g, Refills: 1    Associated Diagnoses: Seborrheic dermatitis           No discharge procedures on file      ED Provider  Electronically Signed by           Jennifer Mckeon DO  08/30/18 8382

## 2018-08-30 NOTE — CASE MANAGEMENT
Initial Clinical Review    Admission: Date/Time/Statement: 8/29/18 @ 2132     Orders Placed This Encounter   Procedures    Inpatient Admission (expected length of stay for this patient is greater than two midnights)     Standing Status:   Standing     Number of Occurrences:   1     Order Specific Question:   Admitting Physician     Answer:   Sascha Brumfield [[id number]]     Order Specific Question:   Level of Care     Answer:   Med Surg [[id number]]     Order Specific Question:   Estimated length of stay     Answer:   More than 2 Midnights     Order Specific Question:   Certification     Answer:   I certify that inpatient services are medically necessary for this patient for a duration of greater than two midnights  See H&P and MD Progress Notes for additional information about the patient's course of treatment  ED: Date/Time/Mode of Arrival:   ED Arrival Information     Expected Arrival Acuity Means of Arrival Escorted By Service Admission Type    - 8/29/2018 18:48 Less Urgent Walk-In Family Member General Medicine Urgent    Arrival Complaint    TOE SWELLING          Chief Complaint:   Chief Complaint   Patient presents with    Toe Swelling     pt c/o right toe swelling  was given antibiotics on monday by urgent care and states that it has gotten more swollen and painful       History of Illness: Joni Adrian is a 33 y o  male history of psychiatric disorder, who presents with chief complaint of right 2nd toe pain and swelling associated with redness  Given antibiotics on Monday by urgent care report condition has gotten worse swelling and pain has increased       ED Vital Signs:   ED Triage Vitals [08/29/18 1900]   Temperature Pulse Respirations Blood Pressure SpO2   99 °F (37 2 °C) 89 16 126/85 99 %      Temp Source Heart Rate Source Patient Position - Orthostatic VS BP Location FiO2 (%)   Oral Monitor Sitting Left arm --      Pain Score       7        Wt Readings from Last 1 Encounters:   08/29/18 94 9 kg (209 lb 3 5 oz)       Vital Signs (abnormal): wnl    Abnormal Labs/Diagnostic Test Results: na   135, cl  98, alt  97, total prot  9 1, wbc  13 02    ED Treatment:   Medication Administration from 08/29/2018 1848 to 08/29/2018 2200       Date/Time Order Dose Route Action Action by Comments     08/29/2018 2047 sodium chloride 0 9 % bolus 1,000 mL 1,000 mL Intravenous New Bag Denzel Villanueva RN      08/29/2018 2121 ceFAZolin (ANCEF) IVPB (premix) 2,000 mg 0 mg Intravenous Stopped Denzel Villanueva RN      08/29/2018 2047 ceFAZolin (ANCEF) IVPB (premix) 2,000 mg 2,000 mg Intravenous [address] Denzel Villanueva RN      08/29/2018 2147 vancomycin (VANCOCIN) 1,250 mg in sodium chloride 0 9 % 250 mL IVPB 1,250 mg Intravenous New Bag Denzel Villanueva RN      08/29/2018 2044 ketorolac (TORADOL) injection 15 mg 15 mg Intravenous Given Denzel Villanueva RN           Past Medical/Surgical History: Active Ambulatory Problems     Diagnosis Date Noted    Gastroesophageal reflux disease 09/17/2015    Hyperlipidemia 08/16/2017    Impaired fasting glucose 10/31/2017    Jock itch 09/17/2015    Morbid obesity due to excess calories ([facility] ) 09/17/2015    Schizophrenia ([place] [address] ) 21/10/6288    Non-alcoholic fatty liver disease 06/15/2018     Resolved Ambulatory Problems     Diagnosis Date Noted    Elevated liver enzymes 11/19/2015     Past Medical History:   Diagnosis Date    GERD (gastroesophageal reflux disease)     H  pylori infection     Hematochezia     Hypercholesteremia     Hyperlipidemia     Psychosis     Schizophrenia ([facility] )        Admitting Diagnosis: Leukocytosis [D72 829]  Toe swelling [M79 89]  Cellulitis of right foot [L03 115]  Failure of outpatient treatment [Z78 9]    Age/Sex: 33 y o  male    Assessment/Plan:        Cellulitis of extremity   Assessment & Plan     Right foot, 2nd toe, erythema with swelling  Does not meet SIRS criteria  Leukocytosis at 13 0 02  Will trend  Primary source, tinea Pedia     Blood cultures, Consult Podiatry for further management  Ancef IV  Comfort Measures  Monitor WBC, temp  X-ray pending         Tinea pedis of right foot   Assessment & Plan     Continue Lotrisone cream   Podiatry consulted         Psychiatric disorder   Assessment & Plan     History of schizophrenia  Continue Zyprexa        * Toe swelling   Assessment & Plan     In the setting of cellulitis  See workup above            VTE Prophylaxis: Low risk  / sequential compression device   Code Status:  Full code  POLST: POLST form is not discussed and not completed at this time  Discussion with family:  No family at bedside   Anticipated Length of Stay:  Patient will be admitted on an Inpatient basis with an anticipated length of stay of  greater than 2 midnights  Justification for Hospital Stay:  Cellulitis, IV antibiotics, podiatry consult         Admission Orders:  Scheduled Meds:   Current Facility-Administered Medications:  acetaminophen 650 mg Oral Q6H PRN KANNAN Hickey    cefazolin 2,000 mg Intravenous Q12H KANNAN Hickey Last Rate: 2,000 mg (08/30/18 0911)   clotrimazole-betamethasone  Topical BID KANNAN Hickey    famotidine 20 mg Oral Daily KANNAN Hickey    OLANZapine 15 mg Oral HS KANNAN Hickey    ondansetron 4 mg Intravenous Q6H PRN KANNAN Hickey      Continuous Infusions:    PRN Meds:   acetaminophen    ondansetron    Reg diet   SCD  Up and OOB as gia   Podiatry consult   Reg diet   8/30 hgb a1c , cbc , bmp       IM note   8/30  Assessment:  Principal Problem:    Toe swelling  Active Problems:    Cellulitis of extremity    Psychiatric disorder    Tinea pedis of right foot  Resolved Problems:   Plan:  · Cellulitis of right 2nd toe secondary to tinea pedis improving on vancomycin and Ancef  He failed Keflex and fluconazole as outpatient  Await podiatry evaluation  Planned discharge home tomorrow on oral antibiotics if he remains stable with close outpatient follow-up  Continue Lotrisone for now for the tinea pedis pending further Podiatry recommendations    Follow-up x-ray when available  · GERD stable with famotidine  · Schizophrenia stable on Zyprexa

## 2018-08-30 NOTE — H&P
H&P- Martell Fuentes 1990, 33 y o  male MRN: [id number]    Unit/Bed#: -01 Encounter: [id number]    Primary Care Provider: Wilfredo Guillen MD   Date and time admitted to hospital: 8/29/2018  7:01 PM        Cellulitis of extremity   Assessment & Plan    Right foot, 2nd toe, erythema with swelling  Does not meet SIRS criteria  Leukocytosis at 13 0 02  Will trend  Primary source, tinea Pedia  Blood cultures,   Consult Podiatry for further management  Ancef IV  Comfort Measures  Monitor WBC, temp  X-ray pending          Tinea pedis of right foot   Assessment & Plan    Continue Lotrisone cream   Podiatry consulted  Psychiatric disorder   Assessment & Plan    History of schizophrenia  Continue Zyprexa        * Toe swelling   Assessment & Plan    In the setting of cellulitis  See workup above  VTE Prophylaxis: Low risk  / sequential compression device   Code Status:  Full code  POLST: POLST form is not discussed and not completed at this time  Discussion with family:  No family at bedside    Anticipated Length of Stay:  Patient will be admitted on an Inpatient basis with an anticipated length of stay of  greater than 2 midnights  Justification for Hospital Stay:  Cellulitis, IV antibiotics, podiatry consult    Total Time for Visit, including Counseling / Coordination of Care: 40   Greater than 50% of this total time spent on direct patient counseling and coordination of care  Chief Complaint:   Toe swelling    History of Present Illness:    Martell Fuentes is a 33 y o  male history of psychiatric disorder, who presents with chief complaint of right 2nd toe pain and swelling associated with redness  Given antibiotics on Monday by urgent care report condition has gotten worse swelling and pain has increased  Patient denies fever, chills, trauma or injury  Patient reports history of athlete's foot report has used multiple treatments with no relief      Review of Systems:    Review of Systems   Skin: Positive for rash  Right lower extremity erythema swelling and warmth  Associated with pain  Rash between toes   All other systems reviewed and are negative  Past Medical and Surgical History:     Past Medical History:   Diagnosis Date    GERD (gastroesophageal reflux disease)     H  pylori infection     Hematochezia     Hypercholesteremia     Hyperlipidemia     Psychosis     Last Assessed:  11/19/15    Schizophrenia [facility])        Past Surgical History:   Procedure Laterality Date    COLONOSCOPY      DE COLONOSCOPY FLX DX W/COLLJ SPEC WHEN PFRMD N/A 9/27/2017    Procedure: COLONOSCOPY;  Surgeon: Lavon Neely MD;  Location: [facility]; Service: Gastroenterology       Meds/Allergies:    Prior to Admission medications    Medication Sig Start Date End Date Taking? Authorizing Provider   clotrimazole-betamethasone (LOTRISONE) 1-0 05 % cream APPLY TO AFFECTED AREAS IN INGUINAL REGION TWICE DAILY AS NEEDED  Patient not taking: Reported on 8/14/2018 7/1/18   Paris Park MD   desonide (DESOWEN) 0 05 % cream Apply topically to affected areas of the face twice daily as needed 8/14/18   Paris Park MD   OLANZapine (ZyPREXA) 15 mg tablet Take 15 mg by mouth daily at bedtime    Historical Provider, MD   ranitidine (ZANTAC) 150 mg tablet Take 1 tablet (150 mg total) by mouth 2 (two) times a day 5/17/18   KANNAN Lawton   triamcinolone (KENALOG) 0 5 % cream Apply topically to lesions on chest 2-3 times daily as needed 8/14/18   Paris Park MD     I have reviewed home medications with patient personally      Allergies: No Known Allergies    Social History:     Marital Status: Single   Occupation:  Unemployed  Patient Pre-hospital Living Situation:  Home  Patient Pre-hospital Level of Mobility:  Independent  Patient Pre-hospital Diet Restrictions:  None  Substance Use History:   History   Alcohol Use    Yes     Comment: occ     History   Smoking Status    Former Smoker Smokeless Tobacco    Never Used     Comment: Per Allscripts:  Never a smoker     History   Drug Use No       Family History:    non-contributory    Physical Exam:     Vitals:   Blood Pressure: 129/61 (08/29/18 2207)  Pulse: 86 (08/29/18 2207)  Temperature: 98 7 °F (37 1 °C) (08/29/18 2207)  Temp Source: Oral (08/29/18 2207)  Respirations: 18 (08/29/18 2207)  Height: 5' 8" (172 7 cm) (08/29/18 2207)  Weight - Scale: 94 9 kg (209 lb 3 5 oz) (08/29/18 2207)  SpO2: 100 % (08/29/18 2207)    Physical Exam   Constitutional: He is oriented to person, place, and time  He appears well-developed and well-nourished  No distress  HENT:   Head: Normocephalic  Mouth/Throat: Oropharynx is clear and moist    Neck: Normal range of motion  Neck supple  No thyromegaly present  Cardiovascular: Normal rate, regular rhythm, normal heart sounds and intact distal pulses  No murmur heard  Pulmonary/Chest: Effort normal and breath sounds normal  No respiratory distress  He has no wheezes  He exhibits no tenderness  Abdominal: Soft  Bowel sounds are normal  He exhibits no distension  There is no tenderness  Musculoskeletal: Normal range of motion  He exhibits tenderness  He exhibits no edema  Lymphadenopathy:     He has no cervical adenopathy  Neurological: He is alert and oriented to person, place, and time  Skin: Skin is warm and dry  Rash (Right foot, between toes, rash) noted  He is not diaphoretic  There is erythema (Right foot, 2nd toe  Erythema streaking up dorsal foot, swelling, tender  All pulses palpable  Minimal range of motion of 2nd toe )  Psychiatric: He has a normal mood and affect  Nursing note and vitals reviewed  Additional Data:     Lab Results: I have personally reviewed pertinent reports          Results from last 7 days  Lab Units 08/29/18 2029   WBC Thousand/uL 13 02*   HEMOGLOBIN g/dL 15 8   HEMATOCRIT % 46 1   PLATELETS Thousands/uL 267   NEUTROS PCT % 74   LYMPHS PCT % 17   MONOS PCT % 8   EOS PCT % 0       Results from last 7 days  Lab Units 08/29/18 2029   SODIUM mmol/L 135*   POTASSIUM mmol/L 3 9   CHLORIDE mmol/L 98*   CO2 mmol/L 29   BUN mg/dL 7   CREATININE mg/dL 0 96   CALCIUM mg/dL 9 9   ALK PHOS U/L 82   ALT U/L 97*   AST U/L 33       Results from last 7 days  Lab Units 08/29/18 2029   INR  1 04               Imaging: I have personally reviewed pertinent reports  XR foot 2 vw right    (Results Pending)       EKG, Pathology, and Other Studies Reviewed on Admission:   · EKG:     Allscripts / Epic Records Reviewed: Yes     ** Please Note: This note has been constructed using a voice recognition system   **

## 2018-08-30 NOTE — ASSESSMENT & PLAN NOTE
Right foot, 2nd toe, erythema with swelling  Does not meet SIRS criteria  Leukocytosis at 13 0 02  Will trend  Primary source, tinea Pedia  Blood cultures,   Consult Podiatry for further management  Ancef IV  Comfort Measures  Monitor WBC, temp    X-ray pending

## 2018-08-30 NOTE — PROGRESS NOTES
Progress Note - Oracio Dorsey 33 y o  male MRN: [id number]  Unit/Bed#: -01 Encounter: [id number]    Subjective:   Yue Herrera feels well this morning  Pain has resolved  The redness of his forefoot has improved  He denies any fevers, chills, chest pain, shortness of breath, palpitations, headache, dizziness, weakness, nausea, vomiting or diarrhea  All other ROS are negative  Objective:   Vitals: Blood pressure 130/69, pulse 88, temperature 97 9 °F (36 6 °C), temperature source Oral, resp  rate 18, height 5' 8" (1 727 m), weight 94 9 kg (209 lb 3 5 oz), SpO2 99 %  ,Body mass index is 31 81 kg/m²  SPO2 RA Rest      ED to Hosp-Admission (Current) from 8/29/2018 in [address] [place] [address] 3rd Floor Med Surg Unit   SpO2  99 %   SpO2 Activity  At Rest   O2 Device  None (Room air)   O2 Flow Rate  --        I&O:   Intake/Output Summary (Last 24 hours) at 08/30/18 0759  Last data filed at 08/29/18 2047   Gross per 24 hour   Intake             1000 ml   Output                0 ml   Net             1000 ml         Physical Exam:       General Appearance:    Alert, cooperative, no distress   Head:    Normocephalic, without obvious abnormality, atraumatic   Eyes:    PERRL, conjunctiva/corneas clear, EOM's intact       Nose:   Moist mucous membranes, no drainage or sinus tenderness           Lungs:     Clear to auscultation bilaterally, respirations unlabored   Heart:    Regular rate and rhythm, S1 and S2 normal, no murmur, rub   or gallop   Abdomen: Soft, non-tender, positive bowel sounds, no masses, no organomegaly   Extremities:  No pedal edema, calf tenderness  Distal pulses palpable  Right 2nd toe is erythematous, there is maceration secondary to tinea pedis between 2nd and 3rd toe   Neurologic:     CNII-XII intact        Invasive Devices     Peripheral Intravenous Line            Peripheral IV 08/29/18 Right Antecubital less than 1 day                      Social History  reviewed  Family History   Problem Relation Age of Onset    Hypertension Family     Cancer Family     reviewed    Meds:  Current Facility-Administered Medications   Medication Dose Route Frequency Provider Last Rate Last Dose    acetaminophen (TYLENOL) tablet 650 mg  650 mg Oral Q6H PRN KANNAN Hickey        ceFAZolin (ANCEF) IVPB (premix) 2,000 mg  2,000 mg Intravenous Q12H KANNAN Hickey        clotrimazole-betamethasone (LOTRISONE) 1-0 05 % cream   Topical BID KANNAN Hickey        famotidine (PEPCID) tablet 20 mg  20 mg Oral Daily KANNAN Hickey        OLANZapine (ZyPREXA) tablet 15 mg  15 mg Oral HS KANNAN Hickey   15 mg at 08/30/18 0117    ondansetron (ZOFRAN) injection 4 mg  4 mg Intravenous Q6H PRN KANNAN Hickey          Prescriptions Prior to Admission   Medication    clotrimazole-betamethasone (LOTRISONE) 1-0 05 % cream    desonide (DESOWEN) 0 05 % cream    OLANZapine (ZyPREXA) 15 mg tablet    ranitidine (ZANTAC) 150 mg tablet    triamcinolone (KENALOG) 0 5 % cream       Labs:    Results from last 7 days  Lab Units 08/30/18  0507 08/29/18 2029   WBC Thousand/uL 9 21 13 02*   HEMOGLOBIN g/dL 13 8 15 8   HEMATOCRIT % 40 8 46 1   PLATELETS Thousands/uL 226 267   NEUTROS PCT % 66 74   LYMPHS PCT % 23 17   MONOS PCT % 9 8   EOS PCT % 1 0       Results from last 7 days  Lab Units 08/30/18  0507 08/29/18 2029   SODIUM mmol/L 141 135*   POTASSIUM mmol/L 4 3 3 9   CHLORIDE mmol/L 107 98*   CO2 mmol/L 25 29   BUN mg/dL 11 7   CREATININE mg/dL 1 09 0 96   CALCIUM mg/dL 9 0 9 9   ALK PHOS U/L  --  82   ALT U/L  --  97*   AST U/L  --  33     No results found for: TROPONINI, CKMB, CKTOTAL    Results from last 7 days  Lab Units 08/29/18 2029   INR  1 04     No results found for: Tee Hutch, SPUTUMCULTUR    Imaging:  No results found for this or any previous visit  No results found for this or any previous visit      VTE Pharmacologic Prophylaxis: None, low risk patient      Code Status:   Level 1 - Full Code    Assessment:  Principal Problem:    Toe swelling  Active Problems:    Cellulitis of extremity    Psychiatric disorder    Tinea pedis of right foot  Resolved Problems:    * No resolved hospital problems  *      Plan:  · Cellulitis of right 2nd toe secondary to tinea pedis improving on vancomycin and Ancef  He failed Keflex and fluconazole as outpatient  Await podiatry evaluation  Planned discharge home tomorrow on oral antibiotics if he remains stable with close outpatient follow-up  Continue Lotrisone for now for the tinea pedis pending further Podiatry recommendations    Follow-up x-ray when available  · GERD stable with famotidine  · Schizophrenia stable on Zyprexa      Chrissie Panchal MD  8/30/2018,7:59 AM

## 2018-08-31 ENCOUNTER — TRANSITIONAL CARE MANAGEMENT (OUTPATIENT)
Dept: INTERNAL MEDICINE CLINIC | Facility: CLINIC | Age: 28
End: 2018-08-31

## 2018-08-31 VITALS
HEIGHT: 68 IN | DIASTOLIC BLOOD PRESSURE: 66 MMHG | HEART RATE: 57 BPM | TEMPERATURE: 98.9 F | SYSTOLIC BLOOD PRESSURE: 122 MMHG | RESPIRATION RATE: 17 BRPM | BODY MASS INDEX: 31.71 KG/M2 | OXYGEN SATURATION: 98 % | WEIGHT: 209.22 LBS

## 2018-08-31 DIAGNOSIS — L03.115 CELLULITIS OF RIGHT LOWER EXTREMITY: ICD-10-CM

## 2018-08-31 PROCEDURE — 99238 HOSP IP/OBS DSCHRG MGMT 30/<: CPT | Performed by: INTERNAL MEDICINE

## 2018-08-31 RX ORDER — CEPHALEXIN 500 MG/1
500 CAPSULE ORAL 4 TIMES DAILY
Qty: 28 CAPSULE | Refills: 0 | Status: SHIPPED | OUTPATIENT
Start: 2018-08-31 | End: 2018-08-31 | Stop reason: SDUPTHER

## 2018-08-31 RX ORDER — CIPROFLOXACIN 500 MG/1
500 TABLET, FILM COATED ORAL EVERY 12 HOURS SCHEDULED
Qty: 14 TABLET | Refills: 0 | Status: SHIPPED | OUTPATIENT
Start: 2018-08-31 | End: 2018-09-05 | Stop reason: HOSPADM

## 2018-08-31 RX ORDER — ACETAMINOPHEN 650 MG
TABLET, EXTENDED RELEASE ORAL
Qty: 480 ML | Refills: 0 | Status: SHIPPED | OUTPATIENT
Start: 2018-08-31 | End: 2018-10-03

## 2018-08-31 RX ORDER — CLOTRIMAZOLE 1 %
CREAM (GRAM) TOPICAL
Qty: 30 G | Refills: 0 | Status: SHIPPED | OUTPATIENT
Start: 2018-08-31 | End: 2018-11-01

## 2018-08-31 RX ORDER — CEPHALEXIN 500 MG/1
500 CAPSULE ORAL 4 TIMES DAILY
Qty: 28 CAPSULE | Refills: 0 | Status: SHIPPED | OUTPATIENT
Start: 2018-08-31 | End: 2018-09-07

## 2018-08-31 RX ADMIN — FAMOTIDINE 20 MG: 20 TABLET ORAL at 08:47

## 2018-08-31 RX ADMIN — CLOTRIMAZOLE AND BETAMETHASONE DIPROPIONATE: 10; .5 CREAM TOPICAL at 08:48

## 2018-08-31 RX ADMIN — CEFAZOLIN SODIUM 2000 MG: 2 SOLUTION INTRAVENOUS at 08:47

## 2018-08-31 NOTE — DISCHARGE SUMMARY
Discharge Summary - Mario Astorga 33 y o  male MRN: [id number]  Unit/Bed#: -53 Encounter: [id number]    Admission Date:    8/29/2018   Discharge Date:   08/31/18   Admitting Diagnosis:   Leukocytosis [D72 829]  Toe swelling [M79 89]  Cellulitis of right foot [A56 833]  Failure of outpatient treatment [Z78 9]  Admitting Provider:   Zofia Donahue MD  Discharge Provider:   Kassie Toussaint MD     Primary Care Physician at Discharge:   Kassie Toussaint MD,[phone]    HPI:   Mario Astorga is a 33 y o  male history of psychiatric disorder, who presents with chief complaint of right 2nd toe pain and swelling associated with redness  Given antibiotics (cephalexin and Diflucan) on Monday by urgent care report condition has gotten worse swelling and pain has increased  Patient denies fever, chills, trauma or injury  Patient reports history of athlete's foot report has used multiple treatments with no relief  Procedures Performed:   No orders of the defined types were placed in this encounter  Hospital Course:   Erythema of the right foot improved dramatically with vancomycin and Ancef  Patient was evaluated by Podiatry recommended Betadine paint between the toes for 1 week followed by clotrimazole for 1 week in addition to transitioning to oral antibiotics including cephalexin and Cipro for 1 week  Consulting Providers   Podiatry    Significant Findings, Care, Treatment and Services Provided:   Right foot x-ray:IMPRESSION:     Soft tissue swelling without an osseous abnormality    Complications:   None  Physical Exam:  General Appearance:    Alert, cooperative, no distress   Head:    Normocephalic, without obvious abnormality, atraumatic                               Extremities:  No pedal edema, calf tenderness  Distal pulses palpable  Right 2nd toe is erythematous, there is maceration between the 1st and 2nd and 2nd and 3rd toes without ulceration or exudate    The erythema of the forefoot has receded to the level of 2nd MTP       Labs:   Lab Results   Component Value Date    WBC 9 21 08/30/2018    RBC 4 56 08/30/2018    HGB 13 8 08/30/2018    HCT 40 8 08/30/2018    MCV 90 08/30/2018    MCH 30 3 08/30/2018    RDW 12 9 08/30/2018     08/30/2018     Lab Results   Component Value Date    CREATININE 1 09 08/30/2018    BUN 11 08/30/2018     08/30/2018    K 4 3 08/30/2018     08/30/2018    CO2 25 08/30/2018    ALKPHOS 82 08/29/2018    ALT 97 (H) 08/29/2018    AST 33 08/29/2018    BILIDIR 0 09 05/13/2018       Treatments:  antibiotics: Ancef and vancomycin    Discharge Diagnosis:   Principal Problem:    Toe swelling  Active Problems:    Cellulitis of extremity    Psychiatric disorder    Tinea pedis of right foot  Resolved Problems:    * No resolved hospital problems  *      Condition at Discharge:   Good     Code Status: Level 1 - Full Code  Advance Directive and Living Will: <no information>  Power of :    POLST:      Discharge instructions/Information to patient and family:   See after visit summary for information provided to patient and family  Provisions for Follow-Up Care:  See after visit summary for information related to follow-up care and any pertinent home health orders  Disposition:   Home    Planned Readmission:   No    Discharge Statement   I spent 25 minutes discharging the patient  This time was spent on the day of discharge  I had direct contact with the patient on the day of discharge  Additional documentation is required if more than 30 minutes were spent on discharge  Greater than 50% of the total time was spent examining patient, answering all patient questions, arranging and discussing plan of care with patient as well as directly providing post-discharge instructions  Additional time then spent on discharge activities  Discharge Medications:  See after visit summary for reconciled discharge medications provided to patient and family        Neo Fuchs MD Marielle  8/31/2018,7:41 AM

## 2018-08-31 NOTE — PROGRESS NOTES
Progress Note - Podiatry   Hali Dodson 33 y o  male MRN: [id number]  Unit/Bed#: -87 Encounter: [id number]      Assessment:   1  Cellulitis right foot  2   Tinea infection to digital interspaced  Plan:   1  Patient will be sent home with oral antibiotic therapy and betadine paint to the interspaces  2   Patient should follow up outpatient with podiatry for anti-fungal therapy  Subjective:  No fever or chills  Pain in right foot  Objective:    Vitals: Blood pressure 122/66, pulse 57, temperature 98 9 °F (37 2 °C), temperature source Oral, resp  rate 17, height 5' 8" (1 727 m), weight 94 9 kg (209 lb 3 5 oz), SpO2 98 %  ,Body mass index is 31 81 kg/m²  Physical Exam:   GEN:  Awake and alert  Right foot with cellulitis of the toe and mildly on the dorsal foot  Lateral interspace is macerated which is where infection stems from  Other interspaces are also macerated  Digital toenail fungus is present  Lab, Imaging and other studies: I have personally reviewed pertinent reports       WBC-9 21  A1c-5 2

## 2018-08-31 NOTE — DISCHARGE INSTRUCTIONS
Complete the cephalexin and Cipro as written  Discontinue fluconazole  Pain to the affected areas between year toes twice daily with Betadine for 1 week  After 1 week of Betadine then transition to Lotrisone cream between the toes for 1 week  Follow-up in the office in 1 week

## 2018-09-03 ENCOUNTER — HOSPITAL ENCOUNTER (INPATIENT)
Facility: HOSPITAL | Age: 28
LOS: 2 days | Discharge: HOME | DRG: 603 | End: 2018-09-05
Attending: INTERNAL MEDICINE | Admitting: INTERNAL MEDICINE
Payer: MEDICARE

## 2018-09-03 DIAGNOSIS — L03.115 CELLULITIS OF RIGHT FOOT: Primary | ICD-10-CM

## 2018-09-03 LAB
ALBUMIN SERPL BCP-MCNC: 4.1 G/DL (ref 3.5–5)
ALP SERPL-CCNC: 65 U/L (ref 46–116)
ALT SERPL W P-5'-P-CCNC: 106 U/L (ref 12–78)
ANION GAP SERPL CALCULATED.3IONS-SCNC: 12 MMOL/L (ref 4–13)
AST SERPL W P-5'-P-CCNC: 53 U/L (ref 5–45)
BACTERIA BLD CULT: NORMAL
BACTERIA BLD CULT: NORMAL
BASOPHILS # BLD AUTO: 0.05 THOUSANDS/ΜL (ref 0–0.1)
BASOPHILS NFR BLD AUTO: 1 % (ref 0–1)
BILIRUB SERPL-MCNC: 0.4 MG/DL (ref 0.2–1)
BUN SERPL-MCNC: 10 MG/DL (ref 5–25)
CALCIUM SERPL-MCNC: 9.1 MG/DL (ref 8.3–10.1)
CHLORIDE SERPL-SCNC: 102 MMOL/L (ref 100–108)
CO2 SERPL-SCNC: 24 MMOL/L (ref 21–32)
CREAT SERPL-MCNC: 1.1 MG/DL (ref 0.6–1.3)
EOSINOPHIL # BLD AUTO: 0.03 THOUSAND/ΜL (ref 0–0.61)
EOSINOPHIL NFR BLD AUTO: 0 % (ref 0–6)
ERYTHROCYTE [DISTWIDTH] IN BLOOD BY AUTOMATED COUNT: 12.5 % (ref 11.6–15.1)
GFR SERPL CREATININE-BSD FRML MDRD: 92 ML/MIN/1.73SQ M
GLUCOSE SERPL-MCNC: 107 MG/DL (ref 65–140)
HCT VFR BLD AUTO: 43.4 % (ref 36.5–49.3)
HGB BLD-MCNC: 15.1 G/DL (ref 12–17)
IMM GRANULOCYTES # BLD AUTO: 0.09 THOUSAND/UL (ref 0–0.2)
IMM GRANULOCYTES NFR BLD AUTO: 1 % (ref 0–2)
LACTATE SERPL-SCNC: 1.6 MMOL/L (ref 0.5–2)
LYMPHOCYTES # BLD AUTO: 1.85 THOUSANDS/ΜL (ref 0.6–4.47)
LYMPHOCYTES NFR BLD AUTO: 19 % (ref 14–44)
MCH RBC QN AUTO: 30 PG (ref 26.8–34.3)
MCHC RBC AUTO-ENTMCNC: 34.8 G/DL (ref 31.4–37.4)
MCV RBC AUTO: 86 FL (ref 82–98)
MONOCYTES # BLD AUTO: 0.6 THOUSAND/ΜL (ref 0.17–1.22)
MONOCYTES NFR BLD AUTO: 6 % (ref 4–12)
NEUTROPHILS # BLD AUTO: 7.09 THOUSANDS/ΜL (ref 1.85–7.62)
NEUTS SEG NFR BLD AUTO: 73 % (ref 43–75)
NRBC BLD AUTO-RTO: 0 /100 WBCS
PLATELET # BLD AUTO: 283 THOUSANDS/UL (ref 149–390)
PMV BLD AUTO: 8.8 FL (ref 8.9–12.7)
POTASSIUM SERPL-SCNC: 3.8 MMOL/L (ref 3.5–5.3)
PROT SERPL-MCNC: 8.5 G/DL (ref 6.4–8.2)
RBC # BLD AUTO: 5.03 MILLION/UL (ref 3.88–5.62)
SODIUM SERPL-SCNC: 138 MMOL/L (ref 136–145)
WBC # BLD AUTO: 9.71 THOUSAND/UL (ref 4.31–10.16)

## 2018-09-03 PROCEDURE — 83605 ASSAY OF LACTIC ACID: CPT | Performed by: PHYSICIAN ASSISTANT

## 2018-09-03 PROCEDURE — 80053 COMPREHEN METABOLIC PANEL: CPT | Performed by: PHYSICIAN ASSISTANT

## 2018-09-03 PROCEDURE — 96365 THER/PROPH/DIAG IV INF INIT: CPT

## 2018-09-03 PROCEDURE — 96361 HYDRATE IV INFUSION ADD-ON: CPT

## 2018-09-03 PROCEDURE — 85025 COMPLETE CBC W/AUTO DIFF WBC: CPT | Performed by: PHYSICIAN ASSISTANT

## 2018-09-03 PROCEDURE — 96375 TX/PRO/DX INJ NEW DRUG ADDON: CPT

## 2018-09-03 PROCEDURE — 36415 COLL VENOUS BLD VENIPUNCTURE: CPT | Performed by: PHYSICIAN ASSISTANT

## 2018-09-03 RX ORDER — CIPROFLOXACIN 2 MG/ML
400 INJECTION, SOLUTION INTRAVENOUS ONCE
Status: COMPLETED | OUTPATIENT
Start: 2018-09-04 | End: 2018-09-04

## 2018-09-03 RX ORDER — KETOROLAC TROMETHAMINE 30 MG/ML
30 INJECTION, SOLUTION INTRAMUSCULAR; INTRAVENOUS ONCE
Status: COMPLETED | OUTPATIENT
Start: 2018-09-03 | End: 2018-09-03

## 2018-09-03 RX ADMIN — KETOROLAC TROMETHAMINE 30 MG: 30 INJECTION, SOLUTION INTRAMUSCULAR at 22:44

## 2018-09-03 RX ADMIN — SODIUM CHLORIDE 1000 ML: 0.9 INJECTION, SOLUTION INTRAVENOUS at 22:12

## 2018-09-03 RX ADMIN — CEFTRIAXONE 1000 MG: 1 INJECTION, POWDER, FOR SOLUTION INTRAMUSCULAR; INTRAVENOUS at 23:19

## 2018-09-04 PROBLEM — L03.115 CELLULITIS OF RIGHT FOOT: Status: ACTIVE | Noted: 2018-09-04

## 2018-09-04 LAB
ANION GAP SERPL CALCULATED.3IONS-SCNC: 12 MMOL/L (ref 4–13)
BASOPHILS # BLD AUTO: 0.05 THOUSANDS/ΜL (ref 0–0.1)
BASOPHILS NFR BLD AUTO: 1 % (ref 0–1)
BUN SERPL-MCNC: 9 MG/DL (ref 5–25)
CALCIUM SERPL-MCNC: 8.6 MG/DL (ref 8.3–10.1)
CHLORIDE SERPL-SCNC: 105 MMOL/L (ref 100–108)
CO2 SERPL-SCNC: 23 MMOL/L (ref 21–32)
CREAT SERPL-MCNC: 1.1 MG/DL (ref 0.6–1.3)
EOSINOPHIL # BLD AUTO: 0.08 THOUSAND/ΜL (ref 0–0.61)
EOSINOPHIL NFR BLD AUTO: 1 % (ref 0–6)
ERYTHROCYTE [DISTWIDTH] IN BLOOD BY AUTOMATED COUNT: 12.4 % (ref 11.6–15.1)
ERYTHROCYTE [SEDIMENTATION RATE] IN BLOOD: 22 MM/HOUR (ref 0–10)
GFR SERPL CREATININE-BSD FRML MDRD: 92 ML/MIN/1.73SQ M
GLUCOSE SERPL-MCNC: 92 MG/DL (ref 65–140)
HCT VFR BLD AUTO: 41.9 % (ref 36.5–49.3)
HGB BLD-MCNC: 14.3 G/DL (ref 12–17)
IMM GRANULOCYTES # BLD AUTO: 0.08 THOUSAND/UL (ref 0–0.2)
IMM GRANULOCYTES NFR BLD AUTO: 1 % (ref 0–2)
LYMPHOCYTES # BLD AUTO: 2.91 THOUSANDS/ΜL (ref 0.6–4.47)
LYMPHOCYTES NFR BLD AUTO: 34 % (ref 14–44)
MAGNESIUM SERPL-MCNC: 2 MG/DL (ref 1.6–2.6)
MCH RBC QN AUTO: 30.2 PG (ref 26.8–34.3)
MCHC RBC AUTO-ENTMCNC: 34.1 G/DL (ref 31.4–37.4)
MCV RBC AUTO: 89 FL (ref 82–98)
MONOCYTES # BLD AUTO: 0.63 THOUSAND/ΜL (ref 0.17–1.22)
MONOCYTES NFR BLD AUTO: 7 % (ref 4–12)
NEUTROPHILS # BLD AUTO: 4.72 THOUSANDS/ΜL (ref 1.85–7.62)
NEUTS SEG NFR BLD AUTO: 56 % (ref 43–75)
NRBC BLD AUTO-RTO: 0 /100 WBCS
PHOSPHATE SERPL-MCNC: 3.6 MG/DL (ref 2.7–4.5)
PLATELET # BLD AUTO: 255 THOUSANDS/UL (ref 149–390)
PMV BLD AUTO: 8.6 FL (ref 8.9–12.7)
POTASSIUM SERPL-SCNC: 3.9 MMOL/L (ref 3.5–5.3)
RBC # BLD AUTO: 4.73 MILLION/UL (ref 3.88–5.62)
SODIUM SERPL-SCNC: 140 MMOL/L (ref 136–145)
WBC # BLD AUTO: 8.47 THOUSAND/UL (ref 4.31–10.16)

## 2018-09-04 PROCEDURE — 99223 1ST HOSP IP/OBS HIGH 75: CPT | Performed by: INTERNAL MEDICINE

## 2018-09-04 PROCEDURE — 84100 ASSAY OF PHOSPHORUS: CPT | Performed by: PHYSICIAN ASSISTANT

## 2018-09-04 PROCEDURE — 87070 CULTURE OTHR SPECIMN AEROBIC: CPT | Performed by: INTERNAL MEDICINE

## 2018-09-04 PROCEDURE — G8979 MOBILITY GOAL STATUS: HCPCS

## 2018-09-04 PROCEDURE — 83735 ASSAY OF MAGNESIUM: CPT | Performed by: PHYSICIAN ASSISTANT

## 2018-09-04 PROCEDURE — 87186 SC STD MICRODIL/AGAR DIL: CPT | Performed by: INTERNAL MEDICINE

## 2018-09-04 PROCEDURE — 87147 CULTURE TYPE IMMUNOLOGIC: CPT | Performed by: INTERNAL MEDICINE

## 2018-09-04 PROCEDURE — 85025 COMPLETE CBC W/AUTO DIFF WBC: CPT | Performed by: PHYSICIAN ASSISTANT

## 2018-09-04 PROCEDURE — 87205 SMEAR GRAM STAIN: CPT | Performed by: INTERNAL MEDICINE

## 2018-09-04 PROCEDURE — 87081 CULTURE SCREEN ONLY: CPT | Performed by: INTERNAL MEDICINE

## 2018-09-04 PROCEDURE — G8978 MOBILITY CURRENT STATUS: HCPCS

## 2018-09-04 PROCEDURE — 80048 BASIC METABOLIC PNL TOTAL CA: CPT | Performed by: PHYSICIAN ASSISTANT

## 2018-09-04 PROCEDURE — 85652 RBC SED RATE AUTOMATED: CPT | Performed by: INTERNAL MEDICINE

## 2018-09-04 PROCEDURE — 87077 CULTURE AEROBIC IDENTIFY: CPT | Performed by: INTERNAL MEDICINE

## 2018-09-04 PROCEDURE — 99284 EMERGENCY DEPT VISIT MOD MDM: CPT

## 2018-09-04 PROCEDURE — 97162 PT EVAL MOD COMPLEX 30 MIN: CPT

## 2018-09-04 RX ORDER — ACETAMINOPHEN 325 MG/1
650 TABLET ORAL EVERY 6 HOURS PRN
Status: DISCONTINUED | OUTPATIENT
Start: 2018-09-04 | End: 2018-09-05 | Stop reason: HOSPADM

## 2018-09-04 RX ORDER — OLANZAPINE 2.5 MG/1
10 TABLET ORAL
Status: DISCONTINUED | OUTPATIENT
Start: 2018-09-04 | End: 2018-09-05 | Stop reason: HOSPADM

## 2018-09-04 RX ORDER — TRIAMCINOLONE ACETONIDE 5 MG/G
CREAM TOPICAL 3 TIMES DAILY
Status: DISCONTINUED | OUTPATIENT
Start: 2018-09-04 | End: 2018-09-05 | Stop reason: HOSPADM

## 2018-09-04 RX ORDER — SODIUM CHLORIDE 9 MG/ML
100 INJECTION, SOLUTION INTRAVENOUS CONTINUOUS
Status: DISCONTINUED | OUTPATIENT
Start: 2018-09-04 | End: 2018-09-05 | Stop reason: HOSPADM

## 2018-09-04 RX ORDER — FAMOTIDINE 20 MG/1
20 TABLET, FILM COATED ORAL 2 TIMES DAILY
Status: DISCONTINUED | OUTPATIENT
Start: 2018-09-04 | End: 2018-09-05 | Stop reason: HOSPADM

## 2018-09-04 RX ORDER — ONDANSETRON 2 MG/ML
4 INJECTION INTRAMUSCULAR; INTRAVENOUS EVERY 6 HOURS PRN
Status: DISCONTINUED | OUTPATIENT
Start: 2018-09-04 | End: 2018-09-05 | Stop reason: HOSPADM

## 2018-09-04 RX ADMIN — CEFAZOLIN SODIUM 1000 MG: 1 SOLUTION INTRAVENOUS at 13:10

## 2018-09-04 RX ADMIN — CIPROFLOXACIN 400 MG: 2 INJECTION, SOLUTION INTRAVENOUS at 00:18

## 2018-09-04 RX ADMIN — OLANZAPINE 10 MG: 2.5 TABLET, FILM COATED ORAL at 01:35

## 2018-09-04 RX ADMIN — SODIUM CHLORIDE 100 ML/HR: 0.9 INJECTION, SOLUTION INTRAVENOUS at 12:57

## 2018-09-04 RX ADMIN — TRIAMCINOLONE ACETONIDE 1 APPLICATION: 5 CREAM TOPICAL at 20:36

## 2018-09-04 RX ADMIN — OLANZAPINE 10 MG: 2.5 TABLET, FILM COATED ORAL at 21:59

## 2018-09-04 RX ADMIN — TRIAMCINOLONE ACETONIDE: 5 CREAM TOPICAL at 17:00

## 2018-09-04 RX ADMIN — FAMOTIDINE 20 MG: 20 TABLET ORAL at 08:27

## 2018-09-04 RX ADMIN — FAMOTIDINE 20 MG: 20 TABLET ORAL at 17:00

## 2018-09-04 RX ADMIN — CEFAZOLIN SODIUM 1000 MG: 1 SOLUTION INTRAVENOUS at 20:36

## 2018-09-04 RX ADMIN — SODIUM CHLORIDE 100 ML/HR: 0.9 INJECTION, SOLUTION INTRAVENOUS at 01:36

## 2018-09-04 NOTE — H&P
[facility] Internal Medicine  H&P- Erick Hatfield 1990, 33 y o  male MRN: [id number]    Unit/Bed#: -01 Encounter: [id number]    Primary Care Provider: Radha Huang MD   Date and time admitted to hospital: 9/3/2018  9:58 PM        * Cellulitis of right foot   Assessment & Plan    Present on admission  -Was admitted last Wednesday and was DC  -Reports worsening pain and swelling with purulent drainage  Unable to bare weight without having increased pain  -no fever of leukocytosis  -Started on IV ceftriaxone and cipro in ER  -Cultures pending  -Admit to med surg  -Continue IV Ceftriaxone and cipro for now  - IV normal saline  -Daily wound care  -AM labs  -Supportive care        Schizophrenia [facility])   Assessment & Plan    -Currently stable  -Continue Zyprexa  -Encourage close PCP/psychiatric follow up        Hyperlipidemia   Assessment & Plan    -Currently diet controlled  -Encouraged PCP followup        Gastroesophageal reflux disease   Assessment & Plan    -Continue home medications          VTE Prophylaxis: Low risk after VTE screen  / reason for no mechanical VTE prophylaxis Low risk after VTE screen   Code Status: Level 1- Full code  POLST: POLST is not applicable to this patient  Discussion with family: none    Anticipated Length of Stay:  Patient will be admitted on an Inpatient basis with an anticipated length of stay of  > 2 midnights  Justification for Hospital Stay: cellulitis of right foot    Total Time for Visit, including Counseling / Coordination of Care: 30 minutes  Greater than 50% of this total time spent on direct patient counseling and coordination of care  Chief Complaint:  Right foot cellulitis    History of Present Illness:    Erick Hatfield is a 33 y o  male who presents to the ER complaining of worsening pain and swellmng to right foot  Patient was admitted here at [facility] for cellulitis and was Dc on keflex and Cipro   He was also seen by podiatry during that admission He states that he has been having worsening pain and is unable to bare weight on his right foot without increased pain  He also reports that he notice purulent drainage  He denies fever, chills, N/V/D  Review of Systems:    Review of Systems   Constitutional: Positive for fever  Negative for activity change, chills, fatigue and unexpected weight change  HENT: Negative for sore throat, tinnitus, trouble swallowing and voice change  Eyes: Negative for photophobia, pain, redness and visual disturbance  Respiratory: Negative for cough, chest tightness, shortness of breath and wheezing  Cardiovascular: Negative for chest pain, palpitations and leg swelling  Gastrointestinal: Negative for abdominal pain, constipation, nausea and vomiting  Endocrine: Negative for polydipsia, polyphagia and polyuria  Genitourinary: Negative for difficulty urinating, dysuria, flank pain, frequency and hematuria  Musculoskeletal: Negative for arthralgias, back pain, gait problem and joint swelling  Skin: Positive for wound  Negative for color change, pallor and rash  Neurological: Negative for dizziness, tremors, weakness, light-headedness and headaches  Hematological: Negative for adenopathy  Does not bruise/bleed easily  Psychiatric/Behavioral: Negative for agitation, confusion, hallucinations, self-injury, sleep disturbance and suicidal ideas  Past Medical and Surgical History:     Past Medical History:   Diagnosis Date    GERD (gastroesophageal reflux disease)     H  pylori infection     Hematochezia     Hypercholesteremia     Hyperlipidemia     Psychosis     Last Assessed:  11/19/15    Schizophrenia [facility])        Past Surgical History:   Procedure Laterality Date    COLONOSCOPY      OR COLONOSCOPY FLX DX W/COLLJ SPEC WHEN PFRMD N/A 9/27/2017    Procedure: COLONOSCOPY;  Surgeon: Mechelle Ji MD;  Location: [facility];   Service: Gastroenterology       Meds/Allergies:    Prior to Admission medications    Medication Sig Start Date End Date Taking? Authorizing Provider   cephalexin (KEFLEX) 500 mg capsule Take 1 capsule (500 mg total) by mouth 4 (four) times a day for 7 days 8/31/18 9/7/18 Yes Stephie Patel MD   ciprofloxacin (CIPRO) 500 mg tablet Take 1 tablet (500 mg total) by mouth every 12 (twelve) hours for 7 days 8/31/18 9/7/18 Yes Stephie Patel MD   OLANZapine (ZyPREXA) 15 mg tablet Take 10 mg by mouth daily at bedtime     Yes Historical Provider, MD   ranitidine (ZANTAC) 150 mg tablet Take 1 tablet (150 mg total) by mouth 2 (two) times a day 5/17/18  Yes Marne Dancer, CRNP   triamcinolone (KENALOG) 0 5 % cream Apply topically to lesions on chest 2-3 times daily as needed 8/14/18  Yes Stephie Patel MD   clotrimazole (LOTRIMIN) 1 % cream Apply between affected toes bid x 1 week after completing 1 wk betadine 8/31/18   Stephie Patel MD   desonide (DESOWEN) 0 05 % cream Apply topically to affected areas of the face twice daily as needed 8/14/18   Stephie Patel MD   povidone-iodine (BETADINE) 10 % external solution Apply between toes bid x 1 wk 8/31/18   Stephie Patel MD     I have reviewed home medications with patient personally      Allergies: No Known Allergies    Social History:     Marital Status: Single   Occupation: [employer] worker  Patient Pre-hospital Living Situation: Lives alone  Patient Pre-hospital Level of Mobility: Active  Patient Pre-hospital Diet Restrictions: none reported  Substance Use History:   History   Alcohol Use    Yes     Comment: occ     History   Smoking Status    Former Smoker   Smokeless Tobacco    Never Used     Comment: Per Allscripts:  Never a smoker     History   Drug Use No       Family History:    Family History   Problem Relation Age of Onset    Hypertension Family     Cancer Family        Physical Exam:     Vitals:   Blood Pressure: 115/65 (09/04/18 0056)  Pulse: 88 (09/04/18 0056)  Temperature: 99 3 °F (37 4 °C) (09/04/18 0056)  Temp Source: Oral (09/04/18 0056)  Respirations: 20 (09/04/18 0056)  Height: 5' 8" (172 7 cm) (09/04/18 0056)  Weight - Scale: 93 2 kg (205 lb 7 5 oz) (09/04/18 0056)  SpO2: 98 % (09/04/18 0056)    Physical Exam   Constitutional: He is oriented to person, place, and time  He appears well-developed and well-nourished  No distress  HENT:   Head: Atraumatic  Mouth/Throat: Oropharynx is clear and moist    Eyes: EOM are normal  Pupils are equal, round, and reactive to light  Right eye exhibits no discharge  Left eye exhibits no discharge  Neck: Normal range of motion  Neck supple  No JVD present  Cardiovascular: Normal rate, regular rhythm and intact distal pulses  Exam reveals no friction rub  No murmur heard  Pulmonary/Chest: Effort normal and breath sounds normal  No stridor  No respiratory distress  He has no wheezes  He has no rales  Abdominal: Soft  Bowel sounds are normal  He exhibits no distension  There is no tenderness  There is no rebound  Musculoskeletal: Normal range of motion  He exhibits edema and tenderness  He exhibits no deformity  Swelling, erythema, tenderness to right foot  Lymphadenopathy:     He has no cervical adenopathy  Neurological: He is oriented to person, place, and time  Skin: Skin is warm and dry  No rash noted  He is not diaphoretic  No erythema  No pallor  Psychiatric: He has a normal mood and affect  Vitals reviewed  Additional Data:     Lab Results: I have personally reviewed pertinent reports          Results from last 7 days  Lab Units 09/03/18  2209   WBC Thousand/uL 9 71   HEMOGLOBIN g/dL 15 1   HEMATOCRIT % 43 4   PLATELETS Thousands/uL 283   NEUTROS PCT % 73   LYMPHS PCT % 19   MONOS PCT % 6   EOS PCT % 0       Results from last 7 days  Lab Units 09/03/18  2209   SODIUM mmol/L 138   POTASSIUM mmol/L 3 8   CHLORIDE mmol/L 102   CO2 mmol/L 24   BUN mg/dL 10   CREATININE mg/dL 1 10   CALCIUM mg/dL 9 1   ALK PHOS U/L 65   ALT U/L 106*   AST U/L 53* Results from last 7 days  Lab Units 08/29/18  2029   INR  1 04           Results from last 7 days  Lab Units 08/30/18  0507   HEMOGLOBIN A1C % 5 2       Imaging: I have personally reviewed pertinent reports  No orders to display       EKG, Pathology, and Other Studies Reviewed on Admission:   · EKG: None    Allscripts / Epic Records Reviewed: Yes     ** Please Note: This note has been constructed using a voice recognition system   **

## 2018-09-04 NOTE — CASE MANAGEMENT
Initial Clinical Review    Admission: Date/Time/Statement: inpatient 9/3/18 @ 2344   Prior admit 8/29-8/31    Orders Placed This Encounter   Procedures    Inpatient Admission (expected length of stay for this patient is greater than two midnights)     Standing Status:   Standing     Number of Occurrences:   1     Order Specific Question:   Admitting Physician     Answer:   Dimas Chan [[id number]]     Order Specific Question:   Level of Care     Answer:   Med Surg [[id number]]     Order Specific Question:   Estimated length of stay     Answer:   More than 2 Midnights     Order Specific Question:   Certification     Answer:   I certify that inpatient services are medically necessary for this patient for a duration of greater than two midnights  See H&P and MD Progress Notes for additional information about the patient's course of treatment  ED Arrival Information     Expected Arrival Acuity Means of Arrival Escorted By Service Admission Type    - 9/3/2018 21:40 Urgent Walk-In Family Member General Medicine Urgent    Arrival Complaint    FOOT PAIN        Chief Complaint   Patient presents with    Cellulitis     Patient reports cellulitis in r foot that was d/c on wednesday last week  Patient taking antibiotics  Pain getting worse       History of Illness: 32 yo schizophrenic male to ED from home c/o worsening pain/swelling R foot  Recent admit for cellulitis dc on keflex and cipro, still taking these without improvement  Seen by podiatry during prior admit  Unable to bear wt w/o increased pain  +purulent drainage  Swelling, erythema, tenderness to right foot        ED Vital Signs:   ED Triage Vitals [09/03/18 2154]   Temperature Pulse Respirations Blood Pressure SpO2   99 3 °F (37 4 °C) (!) 120 18 137/86 95 %      Temp Source Heart Rate Source Patient Position - Orthostatic VS BP Location FiO2 (%)   Oral Monitor Sitting Left arm --      Pain Score       8        Wt Readings from Last 1 Encounters:   09/04/18 93 2 kg (205 lb 7 5 oz)       Abnormal Labs/Diagnostic Test Results:   9/3: ast 53   Alt 106   t prot 8 5    9/4: sed rate 22    ED Treatment:   Medication Administration from 09/03/2018 2140 to 09/04/2018 0034       Date/Time Order Dose Route Action Action by Comments     09/03/2018 2212 sodium chloride 0 9 % bolus 1,000 mL 1,000 mL Intravenous Erika 37 Samy Odell RN      09/03/2018 2244 ketorolac (TORADOL) injection 30 mg 30 mg Intravenous Given Samy Odell RN      09/03/2018 2319 cefTRIAXone (ROCEPHIN) 1,000 mg in dextrose 5 % 50 mL IVPB 1,000 mg Intravenous Flaviaet 37 Ted Ruvalcaba RN      09/04/2018 0018 ciprofloxacin (CIPRO) IVPB (premix) 400 mg 400 mg Intravenous New Bag Ted Ruvalcaba RN           Past Medical/Surgical History: Active Ambulatory Problems     Diagnosis Date Noted    Gastroesophageal reflux disease 09/17/2015    Hyperlipidemia 08/16/2017    Impaired fasting glucose 10/31/2017    Jock itch 09/17/2015    Morbid obesity due to excess calories ([facility] ) 09/17/2015    Schizophrenia ([facility] ) 67/76/4980    Non-alcoholic fatty liver disease 06/15/2018    Toe swelling 08/29/2018    Cellulitis of extremity 08/29/2018    Psychiatric disorder 08/29/2018    Tinea pedis of right foot 08/30/2018     Resolved Ambulatory Problems     Diagnosis Date Noted    Elevated liver enzymes 11/19/2015     Past Medical History:   Diagnosis Date    GERD (gastroesophageal reflux disease)     H  pylori infection     Hematochezia     Hypercholesteremia     Hyperlipidemia     Psychosis     Schizophrenia ([facility] )        Admitting Diagnosis: Cellulitis [L03 90]  Cellulitis of right foot [L03 115]    Age/Sex: 33 y o  male    Assessment/Plan: 34 yo male to ED from home admitted due to R foot cellulitis  Prior recent admission for same, foot has not worsened with purulent drainage  Unable to bear weight without increased pain  IV rocephin/cipro started  Cultures pending  NS IV  AM labs     Anticipated Length of Stay:  Patient will be admitted on an Inpatient basis with an anticipated length of stay of  > 2 midnights  Justification for Hospital Stay: cellulitis of right foot    Admission Orders:  Scheduled Meds:   Current Facility-Administered Medications:  acetaminophen 650 mg Oral Q6H PRN   cefazolin 1,000 mg Intravenous Q8H   famotidine 20 mg Oral BID   OLANZapine 10 mg Oral HS   ondansetron 4 mg Intravenous Q6H PRN   sodium chloride 100 mL/hr Intravenous Continuous   triamcinolone  Topical TID     Daily wound care  Ambulate q shift  oob as gia  Wound c&s/gm stain, MRSA culture  hse diet  Cons podiatry  Cons ID  Cons case mgmt  Cons PT/OT      PER ID 9/4: recurrent R foot cellulitis, was improved on IV vanco/cefazolin, but sx recurred when transitioned to PO keflex/cipro  Consider MRSA/resistant organism  Start IV cefazolin 1gm  Q8h, serial foot exams, if drainage found, send for culture  Has dry superficial R foot ulceration-monitor  Also has elevated lft's-liver US suggestive of hepatic steatosis  Could be med related      PER PODIATRY 9/4: cellulitis R foot and tinea pedis; betadine to interspaces, IV antbx per med and ID teams

## 2018-09-04 NOTE — PHYSICIAN ADVISOR
Current patient class: Inpatient  The patient is currently on Hospital Day: 2 at [address]        The patient was admitted to the hospital  on 9/3/18 at [phone] for the following diagnosis:  Cellulitis [L03 90]  Cellulitis of right foot [L03 115]       There is documentation in the medical record of an expected length of stay of at least 2 midnights  The patient is therefore expected to satisfy the 2 midnight benchmark and given the 2 midnight presumption is appropriate for INPATIENT ADMISSION  Given this expectation of a satisfying stay, CMS instructs us that the patient is most often appropriate for inpatient admission under part A provided medical necessity is documented in the chart  After review of the relevant documentation, labs, vital signs and test results, the patient is appropriate for INPATIENT ADMISSION  Admission to the hospital as an inpatient is a complex decision making process which requires the practitioner to consider the patients presenting complaint, history and physical examination and all relevant testing  With this in mind, in this case, the patient was deemed appropriate for INPATIENT ADMISSION  After review of the documentation and testing available at the time of the admission I concur with this clinical determination of medical necessity  The patient does have an inpatient admission within the previous 30 days  The patient was admitted on 8/29/18 and discharged on 8/31/18 as an inpatient  The patient therefore required readmission review  Rationale is as follows: The patient is a 33 yrs   Male who presented to the ED at 9/3/2018  9:58 PM with a chief complaint of Cellulitis (Patient reports cellulitis in r foot that was d/c on wednesday last week  Patient taking antibiotics  Pain getting worse)     Patient admitted with a complaint of increasing pain and swelling of his right foot    He was recently hospitalized for a cellulitis of the right foot and was discharged on oral antibiotics  He was seen in consult by ID and they believed this was a recurrent cellulitis and agreed with continued IV antibiotics  It would be appropriate to consider this admission as RELATED to the previous admission  The patients vitals on arrival were ED Triage Vitals [09/03/18 2154]   Temperature Pulse Respirations Blood Pressure SpO2   99 3 °F (37 4 °C) (!) 120 18 137/86 95 %      Temp Source Heart Rate Source Patient Position - Orthostatic VS BP Location FiO2 (%)   Oral Monitor Sitting Left arm --      Pain Score       8           Past Medical History:   Diagnosis Date    GERD (gastroesophageal reflux disease)     H  pylori infection     Hematochezia     Hypercholesteremia     Hyperlipidemia     Psychosis     Last Assessed:  11/19/15    [facility])      Past Surgical History:   Procedure Laterality Date    COLONOSCOPY      FL COLONOSCOPY FLX DX W/COLLJ SPEC WHEN PFRMD N/A 9/27/2017    Procedure: COLONOSCOPY;  Surgeon: Delgado Dewey MD;  Location: [facility];   Service: Gastroenterology           Consults have been placed to:   IP CONSULT TO CASE MANAGEMENT  IP CONSULT TO INFECTIOUS DISEASES  IP CONSULT TO PODIATRY    Vitals:    09/03/18 2154 09/03/18 2321 09/04/18 0056 09/04/18 0700   BP: 137/86 111/69 115/65 105/58   BP Location: Left arm Left arm Left arm Left arm   Pulse: (!) 120 95 88 78   Resp: 18 18 20 20   Temp: 99 3 °F (37 4 °C)  99 3 °F (37 4 °C)    TempSrc: Oral  Oral    SpO2: 95% 98% 98% 97%   Weight: 90 7 kg (200 lb)  93 2 kg (205 lb 7 5 oz)    Height: 5' 8" (1 727 m)  5' 8" (1 727 m)        Most recent labs:    Recent Labs      09/03/18   2209  09/04/18   0441   WBC  9 71  8 47   HGB  15 1  14 3   HCT  43 4  41 9   PLT  283  255   K  3 8  3 9   NA  138  140   CALCIUM  9 1  8 6   BUN  10  9   CREATININE  1 10  1 10   AST  53*   --    ALT  106*   --    ALKPHOS  65   --        Scheduled Meds:  Current Facility-Administered Medications:  acetaminophen 650 mg Oral Q6H PRN Urban James PA-C    cefazolin 1,000 mg Intravenous Q8H Fela Aldea, DO Last Rate: 1,000 mg (09/04/18 1310)   famotidine 20 mg Oral BID Urban James PA-C    OLANZapine 10 mg Oral HS Urban James PA-C    ondansetron 4 mg Intravenous Q6H PRN Urban James PA-C    sodium chloride 100 mL/hr Intravenous Continuous Urban James PA-C Last Rate: 100 mL/hr (09/04/18 1257)   triamcinolone  Topical TID Urban James PA-C      Continuous Infusions:  sodium chloride 100 mL/hr Last Rate: 100 mL/hr (09/04/18 1257)     PRN Meds:   acetaminophen    ondansetron    Surgical procedures (if appropriate):

## 2018-09-04 NOTE — PLAN OF CARE
Problem: DISCHARGE PLANNING - CARE MANAGEMENT  Goal: Discharge to post-acute care or home with appropriate resources  INTERVENTIONS:  - Conduct assessment to determine patient/family and health care team treatment goals, and need for post-acute services based on payer coverage, community resources, and patient preferences, and barriers to discharge  - Address psychosocial, clinical, and financial barriers to discharge as identified in assessment in conjunction with the patient/family and health care team  - Arrange appropriate level of post-acute services according to patients   needs and preference and payer coverage in collaboration with the physician and health care team  - Communicate with and update the patient/family, physician, and health care team regarding progress on the discharge plan  - Arrange appropriate transportation to post-acute venues  Outcome: Progressing  CM met with pt at bedside  Pt lives alone in an apt with 20 steps w/railing to reach  He is able to navigate steps, but must do it slowly due to the cellulitis in his R foot  He is independent with ADL's and uses no DME's  He has never been in rehab or used [facility] services  He denies substance abuse  He had an episode of depression when he was in high school and was on anti-depressants, but has not been on medication in 8 years  Dr Marv Mccoy is his PCP  He uses [facility] and has no problem with his co-pays  He does not have a POA or Advanced Directive  CM supplied info on both  He works part time-will be starting at the [employer] in [place]  He does not have a license-his parents transports to appointments or he takes the bus  His mother will transport home when he is medically cleared  CM discussed d/c needs including [facility] services, but pt does not feel he will have any problems taking care of his R foot  CM will continue to follow through hospitalization       CM reviewed discharge planning process including the following: identifying help at home, patient preference for discharge planning needs, pharmacy preference, and availability of treatment team to discuss questions or concerns patient and/or family may have regarding understanding medications and recognizing signs and symptoms once discharged  CM also encouraged patient to follow up with all recommended appointments after discharge  Patient advised of importance for patient and family to participate in managing patients medical well being  CM name and role reviewed  Discharge Checklist reviewed and CM will continue to monitor for progress toward discharge goals in nursing and provider rounds

## 2018-09-04 NOTE — ASSESSMENT & PLAN NOTE
Present on admission  -Was admitted last Wednesday and was DC  -Reports worsening pain and swelling with purulent drainage   Unable to bare weight without having increased pain  -no fever of leukocytosis  -Started on IV ceftriaxone and cipro in ER  -Cultures pending  -Admit to med surg  -Continue IV Ceftriaxone and cipro for now  - IV normal saline  -Daily wound care  -AM labs  -Supportive care

## 2018-09-04 NOTE — PLAN OF CARE
Problem: PHYSICAL THERAPY ADULT  Goal: Performs mobility at highest level of function for planned discharge setting  See evaluation for individualized goals  Treatment/Interventions: Functional transfer training, LE strengthening/ROM, Elevations, Therapeutic exercise, Endurance training, Patient/family training, Equipment eval/education, Bed mobility, Gait training, Spoke to nursing  Equipment Recommended:  (to be determined )       See flowsheet documentation for full assessment, interventions and recommendations  Prognosis: Good  Problem List: Decreased strength, Decreased endurance, Impaired balance, Decreased mobility, Decreased skin integrity, Pain  Assessment: Pt is 33 y o  male seen for PT evaluation on 9/4/2018 s/p admit to [facility] on 9/3/2018 w/ Cellulitis of right foot  PT consulted to assess pt's functional mobility and d/c needs  Order placed for PT eval and tx, w/ up and OOB as tolerated and ambulate patient order  Performed at least 2 patient identifiers during session: Name and wristband  Comorbidities affecting pt's physical performance at time of assessment include: hyperlipidemia, morbid obesity, schizophrenia, non-alcoholic fatty liver disease, toe swelling, cellulitis of extremity, psychiatric disorder and tinea pedis of R foot  PTA, pt was independent w/ all functional mobility w/ no assistive device , ambulates community distances and elevations, ambulates unrestricted distances and all terrain, has 20 MINE, lives alone in an apartment level and works part time  Personal factors affecting pt at time of IE include: inaccessible home environment, ambulating w/ assistive device, stairs to enter home, inability to navigate community distances, inability to navigate level surfaces w/o external assistance, unable to perform dynamic tasks in community and inability to perform current job functions   Please find objective findings from PT assessment regarding body systems outlined above with impairments and limitations including weakness, impaired balance, decreased endurance, gait deviations, pain, decreased activity tolerance, decreased functional mobility tolerance, fall risk and decreased skin integrity, as well as mobility assessment (need for CGA to supervision  assist w/ all phases of mobility when usually ambulating independently, tolerance to only 45 feet + 40 feet of ambulation and need for cueing for mobility technique)  The following objective measures performed on IE also reveal limitations: Barthel Index: 60/100 and Modified Anna: 3 (moderate disability)  Pt's clinical presentation is currently evolving seen in pt's presentation of need for input for task focus and mobility technique, need for CGA to supervision  assist w/ all phases of mobility when usually mobilizing independently, tolerance to only 45 feet + 40 feet of ambulation, R 2nd toe  pain impacting overall mobility status and ongoing medical assessment  Pt to benefit from continued PT tx to address deficits as defined above and maximize level of functional independent mobility and consistency  From PT/mobility standpoint, recommendation at time of d/c would be Home PT vs  anticipate no needs pending progress in order to (facilitate return to PLOF)  Barriers to Discharge: Inaccessible home environment, Decreased caregiver support     Recommendation: Home PT (home PT as needed )     PT - OK to Discharge: No    See flowsheet documentation for full assessment

## 2018-09-04 NOTE — ED PROVIDER NOTES
History  Chief Complaint   Patient presents with    Cellulitis     Patient reports cellulitis in r foot that was d/c on wednesday last week  Patient taking antibiotics  Pain getting worse     Jose Wang is a 33 y o  male w PMH schizophrenia, GERD, H pylori, HLD who presents for evaluation of foot infection  Patient complains right foot swelling and pain ongoing for the past week  Tells me was placed on outpatient abx which he is still on  [place] after this seemed the redness and pain worsened  On chart review he has actually already been to Urgent Care and started on keflex, then presented here 3 days later wo any improvement, had negative XR but admitted for IV abx given his sx had failed outpatient abx  He was seen by podiatry and sent home on cipro and keflex for one week  Prior to Admission Medications   Prescriptions Last Dose Informant Patient Reported? Taking?    OLANZapine (ZyPREXA) 15 mg tablet  Self Yes No   Sig: Take 15 mg by mouth daily at bedtime   cephalexin (KEFLEX) 500 mg capsule   No No   Sig: Take 1 capsule (500 mg total) by mouth 4 (four) times a day for 7 days   ciprofloxacin (CIPRO) 500 mg tablet   No No   Sig: Take 1 tablet (500 mg total) by mouth every 12 (twelve) hours for 7 days   clotrimazole (LOTRIMIN) 1 % cream   No No   Sig: Apply between affected toes bid x 1 week after completing 1 wk betadine   desonide (DESOWEN) 0 05 % cream   No No   Sig: Apply topically to affected areas of the face twice daily as needed   povidone-iodine (BETADINE) 10 % external solution   No No   Sig: Apply between toes bid x 1 wk   ranitidine (ZANTAC) 150 mg tablet  Self No No   Sig: Take 1 tablet (150 mg total) by mouth 2 (two) times a day   triamcinolone (KENALOG) 0 5 % cream   No No   Sig: Apply topically to lesions on chest 2-3 times daily as needed      Facility-Administered Medications: None       Past Medical History:   Diagnosis Date    GERD (gastroesophageal reflux disease)     H  pylori infection     Hematochezia     Hypercholesteremia     Hyperlipidemia     Psychosis     Last Assessed:  11/19/15    Schizophrenia [facility])        Past Surgical History:   Procedure Laterality Date    COLONOSCOPY      [place] COLONOSCOPY FLX DX W/COLLJ SPEC WHEN PFRMD N/A 9/27/2017    Procedure: COLONOSCOPY;  Surgeon: Allan Maldonado MD;  Location: [facility]; Service: Gastroenterology       Family History   Problem Relation Age of Onset    Hypertension Family     Cancer Family      I have reviewed and agree with the history as documented  Social History   Substance Use Topics    Smoking status: Former Smoker    Smokeless tobacco: Never Used      Comment: Per All[employer]:  Never a smoker    Alcohol use Yes      Comment: occ        Review of Systems   Constitutional: Negative for activity change, chills, diaphoresis, fatigue and fever  HENT: Negative for congestion and rhinorrhea  Eyes: Negative for pain  Respiratory: Negative for cough, chest tightness, shortness of breath and wheezing  Cardiovascular: Negative for chest pain and palpitations  Gastrointestinal: Negative for abdominal distention, constipation, diarrhea, nausea and vomiting  Genitourinary: Negative for difficulty urinating and dysuria  Musculoskeletal: Negative for arthralgias and myalgias  Skin: Positive for wound  Neurological: Negative for dizziness, weakness, light-headedness and headaches  Psychiatric/Behavioral: The patient is not nervous/anxious  Physical Exam  Physical Exam   Constitutional: He is oriented to person, place, and time  He appears well-developed and well-nourished  No distress  HENT:   Head: Normocephalic and atraumatic  Eyes: Pupils are equal, round, and reactive to light  Neck: Normal range of motion  Neck supple  No tracheal deviation present  Cardiovascular: Normal rate, regular rhythm, normal heart sounds and intact distal pulses  Exam reveals no gallop and no friction rub  No murmur heard  Pulmonary/Chest: Effort normal and breath sounds normal  No respiratory distress  He has no wheezes  He has no rales  Abdominal: Soft  Bowel sounds are normal  He exhibits no distension and no mass  There is no tenderness  There is no guarding  Musculoskeletal: He exhibits no edema or deformity  Neurological: He is alert and oriented to person, place, and time  Skin: Skin is warm and dry  He is not diaphoretic  Erythema streaking long-term across the dorsum the foot  There is P and mild warmth  No drainable abscess  Macerated tissue between the 1st and 2nd toe and 2nd and 3rd toe  The 2nd toe is considerably erythematous with some dried blisters on top of this foot  Pain to palpation hands and pain with ranging the toes but is able to do so and has normal sensation and distal pulses  He has normal range of motion of the foot  No pain tracking up the leg  Psychiatric: He has a normal mood and affect  His behavior is normal    Nursing note and vitals reviewed        Vital Signs  ED Triage Vitals [09/03/18 2154]   Temperature Pulse Respirations Blood Pressure SpO2   99 3 °F (37 4 °C) (!) 120 18 137/86 95 %      Temp Source Heart Rate Source Patient Position - Orthostatic VS BP Location FiO2 (%)   Oral Monitor Sitting Left arm --      Pain Score       8           Vitals:    09/03/18 2154 09/03/18 2321   BP: 137/86 111/69   Pulse: (!) 120 95   Patient Position - Orthostatic VS: Sitting Lying       Visual Acuity      ED Medications  Medications   ciprofloxacin (CIPRO) IVPB (premix) 400 mg (not administered)   sodium chloride 0 9 % bolus 1,000 mL (1,000 mL Intravenous New Bag 9/3/18 2212)   ketorolac (TORADOL) injection 30 mg (30 mg Intravenous Given 9/3/18 2244)   cefTRIAXone (ROCEPHIN) 1,000 mg in dextrose 5 % 50 mL IVPB (1,000 mg Intravenous New Bag 9/3/18 2319)       Diagnostic Studies  Results Reviewed     Procedure Component Value Units Date/Time    Comprehensive metabolic panel [[id number]]  (Abnormal) Collected:  09/03/18 2209    Lab Status:  Final result Specimen:  Blood from Arm, Right Updated:  09/03/18 2245     Sodium 138 mmol/L      Potassium 3 8 mmol/L      Chloride 102 mmol/L      CO2 24 mmol/L      ANION GAP 12 mmol/L      BUN 10 mg/dL      Creatinine 1 10 mg/dL      Glucose 107 mg/dL      Calcium 9 1 mg/dL      AST 53 (H) U/L       (H) U/L      Alkaline Phosphatase 65 U/L      Total Protein 8 5 (H) g/dL      Albumin 4 1 g/dL      Total Bilirubin 0 40 mg/dL      eGFR 92 ml/min/1 73sq m     Narrative:         National Kidney Disease Education Program recommendations are as follows:  GFR calculation is accurate only with a steady state creatinine  Chronic Kidney disease less than 60 ml/min/1 73 sq  meters  Kidney failure less than 15 ml/min/1 73 sq  meters  Lactic acid, plasma [[id number]]  (Normal) Collected:  09/03/18 2209    Lab Status:  Final result Specimen:  Blood from Arm, Right Updated:  09/03/18 2237     LACTIC ACID 1 6 mmol/L     Narrative:         Result may be elevated if tourniquet was used during collection      CBC and differential [[id number]]  (Abnormal) Collected:  09/03/18 2209    Lab Status:  Final result Specimen:  Blood from Arm, Right Updated:  09/03/18 2218     WBC 9 71 Thousand/uL      RBC 5 03 Million/uL      Hemoglobin 15 1 g/dL      Hematocrit 43 4 %      MCV 86 fL      MCH 30 0 pg      MCHC 34 8 g/dL      RDW 12 5 %      MPV 8 8 (L) fL      Platelets 081 Thousands/uL      nRBC 0 /100 WBCs      Neutrophils Relative 73 %      Immat GRANS % 1 %      Lymphocytes Relative 19 %      Monocytes Relative 6 %      Eosinophils Relative 0 %      Basophils Relative 1 %      Neutrophils Absolute 7 09 Thousands/µL      Immature Grans Absolute 0 09 Thousand/uL      Lymphocytes Absolute 1 85 Thousands/µL      Monocytes Absolute 0 60 Thousand/µL      Eosinophils Absolute 0 03 Thousand/µL      Basophils Absolute 0 05 Thousands/µL                  No orders to display              Procedures  Procedures       Phone Contacts  ED Phone Contact    ED Course                         Initial Sepsis Screening     [address] Name 09/03/18 4389                Is the patient's history suggestive of a new or worsening infection? (!)  Yes (Proceed)  -SB        Suspected source of infection soft tissue  -SB        Are two or more of the following signs & symptoms of infection both present and new to the patient? (!)  Yes (Proceed)  -SB        Indicate SIRS criteria Tachycardia > 90 bpm  -SB        If the answer is yes to both questions, suspicion of sepsis is present  --        If severe sepsis is present AND tissue hypoperfusion perists in the hour after fluid resuscitation or lactate > 4, the patient meets criteria for SEPTIC SHOCK  --        Are any of the following organ dysfunction criteria present within 6 hours of suspected infection and SIRS criteria that are NOT considered to be chronic conditions? No  -SB        Organ dysfunction  --        Date of presentation of severe sepsis  --        Time of presentation of severe sepsis  --        Tissue hypoperfusion persists in the hour after crystalloid fluid administration, evidenced, by either:  --        Was hypotension present within one hour of the conclusion of crystalloid fluid administration?  --        Date of presentation of septic shock  --        Time of presentation of septic shock  --          User Key  (r) = Recorded By, (t) = Taken By, (c) = Cosigned By    [address] Name Provider Type    SB Judy Reed PA-C Physician Assistant                  MDM  Number of Diagnoses or Management Options  Diagnosis management comments: DDX includes but not ltd to:   Cellulitis that has worsened over past few days despite outpatient and IV abx  Reports worsened pain and erythema  Not consistent w NSTI given the pain and sx have been ongoing for days   Concern for concomitant fungal infection / athletes foot  Unlikely OM as had recent XR that was normal      Plan is to obtain:  CBC to check for anemia, leukocytosis, hydration status  Chemistry panel to check for lyte abnormalities, organ function   Lactic acid as a marker of end organ dysfunction  Based on results:  Admit for IV abx, not septic  Portions of the record may have been created with voice recognition software   Occasional wrong word or "sound a like" substitutions may have occurred due to the inherent limitations of voice recognition software   Read the chart carefully and recognize, using context, where substitutions have occurred  CritCare Time    Disposition  Final diagnoses:   Cellulitis of right foot     Time reflects when diagnosis was documented in both MDM as applicable and the Disposition within this note     Time User Action Codes Description Comment    9/3/2018 11:43 PM Sol Watts Add [L03 [id number]] Cellulitis of right foot       ED Disposition     ED Disposition Condition Comment    Admit  Case was discussed with [facility] and the patient's admission status was agreed to be Admission Status: inpatient status to the service of Dr Juana Quan          Follow-up Information    None         Patient's Medications   Discharge Prescriptions    No medications on file     No discharge procedures on file      ED Provider  Electronically Signed by           Gil Martin PA-C  09/03/18 [address], [place]  09/03/18 4904       Gil Martin PA-C  09/04/18 7727

## 2018-09-04 NOTE — CONSULTS
Consultation - Infectious Disease   Can Robin 33 y o  male MRN: [id number]  Unit/Bed#: -01 Encounter: [id number]      IMPRESSION & RECOMMENDATIONS:   Impression/Recommendations:  1  Recurrent right foot cellulitis  Had shown improvement after receiving IV vancomycin and cefazolin during recent admission  Patient was then transitioned to oral Keflex and ciprofloxacin on recent discharge and his symptoms recurred  Consideration for more resistant organism, such as MRSA  However, patient was not re-initiated on any anti-MRSA coverage and cellulitis appears minimal on my exam today, suggesting that MRSA is less likely  Also I am not seeing any obvious purulence, fluctuance  There is some surrounding erythema, but minimal warmth and tenderness  No associated fevers, leukocytosis  -start IV cefazolin 1 gram q 8 hours  -continue with serial foot exams  -followup Podiatry recommendations  -if Podiatry does appreciate some drainage on their exam, would send for wound culture  2   Superficial right foot ulceration/maceration  This clinically appears dry  There is no active drainage on exam   Minimal tenderness     -followup Podiatry recommendations  -local wound care  -serial exams    3  Tinea pedis  Likely precipitating cause for #1  Patient was started on topical clotrimazole by Podiatry on last admission     -close podiatry follow-up  -continue topical clotrimazole    4  Schizophrenia  5   Elevated AST/ALT  Recent hepatitis panel was negative  Patient has undergone fairly extensive outpatient workup  Liver ultrasound was suggestive of hepatic steatosis  May also be medication related  Follow-up outpatient  Antibiotics:  Ceftriaxone/Cipro    Discussed above plan with patient in detail  Thank you for this consultation  We will follow along with you  HISTORY OF PRESENT ILLNESS:  Reason for Consult:   Foot cellulitis    HPI: Can Robin is a 33 y o  male with history of psychiatric disorder, recent hospitalization from 08/29/2018 to 8/30 1/18 with chief complaint of right 2nd toe pain and swelling associated with redness  This was not improving with oral Keflex and Diflucan given to patient at urgent care center  The erythema significantly improved after initiation of IV vancomycin and cefazolin during hospitalization  Patient was then transition to oral Keflex and Cipro for another week on discharge  Also was using topical clotrimazole per Podiatry recommendations  Patient now admitted on 09/03 with complaint of increased pain and drainage from the 2nd toe despite adherence to oral antibiotics  No prior wound culture sent  No associated fevers or chills  Today, patient states the pain and redness is much improved  He was given IV ceftriaxone and Cipro on admission  Patient states his drainage was several days ago and it was never purulent  No purulent drainage today  REVIEW OF SYSTEMS:  A complete system-based review of systems is otherwise negative  PAST MEDICAL HISTORY:  Past Medical History:   Diagnosis Date    GERD (gastroesophageal reflux disease)     H  pylori infection     Hematochezia     Hypercholesteremia     Hyperlipidemia     Psychosis     Last Assessed:  11/19/15    [facility])      Past Surgical History:   Procedure Laterality Date    COLONOSCOPY      [place] COLONOSCOPY FLX DX W/COLLJ SPEC WHEN PFRMD N/A 9/27/2017    Procedure: COLONOSCOPY;  Surgeon: Divina Lara MD;  Location: [facility]; Service: Gastroenterology       FAMILY HISTORY:  Non-contributory    SOCIAL HISTORY:  History   Alcohol Use    Yes     Comment: occ     History   Drug Use No     History   Smoking Status    Former Smoker   Smokeless Tobacco    Never Used     Comment: Per Allscripts:  Never a smoker       ALLERGIES:  No Known Allergies    MEDICATIONS:  All current active medications have been reviewed      PHYSICAL EXAM:  Vitals:  HR:  [] 78  Resp: [18-20] 20  BP: (105-137)/(58-86) 105/58  SpO2:  [95 %-98 %] 97 %  Temp (24hrs), Av 3 °F (37 4 °C), Min:99 3 °F (37 4 °C), Max:99 3 °F (37 4 °C)  Current: Temperature: 99 3 °F (37 4 °C)     Physical Exam:  General:  Well-nourished, well-developed, in no acute distress  Eyes:  Conjunctive clear with no hemorrhages or effusions  Oropharynx:  No ulcers, no lesions  Neck:  Supple, no lymphadenopathy  Lungs:  Clear to auscultation bilaterally, no accessory muscle use  Cardiac:  Regular rate and rhythm, no murmurs  Abdomen:  Soft, non-tender, non-distended  Extremities:  No peripheral cyanosis, clubbing, or edema  Skin:  No rashes, no ulcers  Neurological:  Moves all four extremities spontaneously, sensation grossly intact      LABS, IMAGING, & OTHER STUDIES:  Lab Results:  I have personally reviewed pertinent labs  Results from last 7 days  Lab Units 18   SODIUM mmol/L 140 138 141 135*   POTASSIUM mmol/L 3 9 3 8 4 3 3 9   CHLORIDE mmol/L 105 102 107 98*   CO2 mmol/L 23 24 25 29   BUN mg/dL 9 10 11 7   CREATININE mg/dL 1 10 1 10 1 09 0 96   EGFR ml/min/1 73sq m 92 92 93 108   CALCIUM mg/dL 8 6 9 1 9 0 9 9   AST U/L  --  53*  --  33   ALT U/L  --  106*  --  97*   ALK PHOS U/L  --  65  --  82       Results from last 7 days  Lab Units 18  0507   WBC Thousand/uL 8 47 9 71 9 21   HEMOGLOBIN g/dL 14 3 15 1 13 8   PLATELETS Thousands/uL 255 283 226       Results from last 7 days  Lab Units 18   BLOOD CULTURE  No Growth After 5 Days  No Growth After 5 Days  Imaging Studies:   I have personally reviewed pertinent imaging study reports and images in PACS  The foot x-ray shows soft tissue swelling without any osseous abnormality  EKG, Pathology, and Other Studies:   I have personally reviewed pertinent reports

## 2018-09-04 NOTE — PROGRESS NOTES
Progress Note - Kirsten Washburn 33 y o  male MRN: [id number]    Unit/Bed#: -01 Encounter: [id number]      Assessment:  1) Tinea Pedis right foot  2) Cellulitis right foot     Plan:  1) Betadine to interspaces  2) IV antibiotics as per Medicine and ID  3) No surgical intervention needed     Subjective:   Patient seen bedside resting comfortably  NAD  Patient states that he came back to the ER when his foot swelled  Objective:     Vitals: Blood pressure 105/58, pulse 78, temperature 99 3 °F (37 4 °C), temperature source Oral, resp  rate 20, height 5' 8" (1 727 m), weight 93 2 kg (205 lb 7 5 oz), SpO2 97 %  ,Body mass index is 31 24 kg/m²        Intake/Output Summary (Last 24 hours) at 09/04/18 1303  Last data filed at 09/04/18 1257   Gross per 24 hour   Intake             1000 ml   Output                0 ml   Net             1000 ml       Physical Exam: no open lesion, ID maceration right foot 3rd interspace, no purulence, no fluctuence, erythema is resolving, dry scaly skin, pulses palpable, pedal hair is present, POP to the foot     Invasive Devices     Peripheral Intravenous Line            Peripheral IV 09/03/18 Right Antecubital less than 1 day

## 2018-09-04 NOTE — PHYSICAL THERAPY NOTE
PHYSICAL THERAPY EVALUATION           Patient Name: Luz Maria Sanchez  [id number] Date: 9/4/2018 09/04/18 [id number]   Note Type   Note type Eval only   Pain Assessment   Pain Assessment 0-10   Pain Score 4   Pain Type Acute pain   Pain Location Toe (Comment which one)  (2nd toe on R foot )   Pain Orientation Right   Pain Descriptors Numbness   Pain Frequency Intermittent   Pain Onset Ongoing  ("it got really bad yesterday  ")   Effect of Pain on Daily Activities limited mobility    Patient's Stated Pain Goal No pain   Hospital Pain Intervention(s) Ambulation/increased activity   Home Living   Type of Home Apartment   Home Layout Stairs to enter with rails; Able to live on main level with bedroom/bathroom  (20 steps to enter apt w/R handrail)   Bathroom Shower/Tub Tub/shower unit   Bathroom Toilet Standard   Bathroom Equipment (none per pt report )   [address] (none per pt report )   Additional Comments Pt reports that he was ambulating on his heel to decrease pain in R toe  Prior Function   Level of Clinch Independent with ADLs and functional mobility   Lives With Alone   ADL Assistance Independent   IADLs Independent   Falls in the last 6 months 0   Vocational Part time employment  ([employer] )   Comments no weight bearing restrictions per RN [employer]    Restrictions/Precautions   Weight Bearing Precautions Per Order No   Braces or Orthoses (non slip shoes as per pt report )   Other Precautions Contact/isolation; Chair Alarm; Fall Risk;Pain;Multiple lines   General   Additional Pertinent History refer to assessment for details    Family/Caregiver Present No   Cognition   Overall Cognitive Status WFL   Arousal/Participation Alert   Orientation Level Oriented X4   Memory Within functional limits   Following Commands Follows all commands and directions without difficulty   RUE Assessment RUE Assessment WNL   LUE Assessment   LUE Assessment WNL   RLE Assessment   RLE Assessment X   RLE Overall AROM   R Hip Flexion WNL   R Knee Extension WNL   R Ankle Dorsiflexion WFL   Strength RLE   R Hip Flexion 4/5   R Knee Extension 4/5   R Ankle Dorsiflexion 3/5  (graded without resistance secondary to pain)   LLE Assessment   LLE Assessment X   LLE Overall AROM   L Hip Flexion WNL   L Knee Extension WNL   L Ankle Dorsiflexion WNL   Strength LLE   L Hip Flexion 4/5   L Knee Extension 4/5   L Ankle Dorsiflexion 4/5   Coordination   Movements are Fluid and Coordinated 1  (finger to thumb opposition intact )   Sensation WFL   Light Touch   RLE Light Touch Grossly intact   LLE Light Touch Grossly intact   Bed Mobility   Rolling R 6  Modified independent   Additional items Increased time required; Bedrails   Supine to Sit 6  Modified independent   Additional items Bedrails; Increased time required   Sit to Supine 6  Modified independent   Additional items Bedrails; Increased time required   Transfers   Sit to Stand 5  Supervision   Additional items Assist x 1; Increased time required;Verbal cues   Stand to Sit 5  Supervision   Additional items Assist x 1; Increased time required;Armrests; Bedrails;Verbal cues   Toilet transfer 5  Supervision   Additional items Assist x 1; Armrests;Standard toilet   Additional Comments Pt ambulated 45 feet with a RW and CGA to SBA  Patient also ambulated 40 feet x 1 trial with supervision with patient utilizing IV pole for unilateral UE assist     Ambulation/Elevation   Gait pattern Short stride; Excessively slow;Decreased R stance;Decreased foot clearance; Antalgic   Gait Assistance (CGA to supervision)   Additional items Assist x 1;Verbal cues; Tactile cues   Assistive Device Rolling walker  (and IV pole )   Distance 45 feet x 1 and 40 feet x 1 trial    Stair Management Assistance Not tested   Balance   Static Sitting Normal   Dynamic Sitting Normal   Static Standing Fair +   Dynamic Standing Fair   Ambulatory Fair   Endurance Deficit   Endurance Deficit Yes   Activity Tolerance   Activity Tolerance Patient limited by pain   Nurse Made Aware yes, RN Amadou Her verbalized pt appropriate for PT evaluation; made aware of therapy outcome/recs    Assessment   Prognosis Good   Problem List Decreased strength;Decreased endurance; Impaired balance;Decreased mobility; Decreased skin integrity;Pain   Assessment Pt is 33 y o  male seen for PT evaluation on 9/4/2018 s/p admit to [facility] on 9/3/2018 w/ Cellulitis of right foot  PT consulted to assess pt's functional mobility and d/c needs  Order placed for PT eval and tx, w/ up and OOB as tolerated and ambulate patient order  Performed at least 2 patient identifiers during session: Name and wristband  Comorbidities affecting pt's physical performance at time of assessment include: hyperlipidemia, morbid obesity, schizophrenia, non-alcoholic fatty liver disease, toe swelling, cellulitis of extremity, psychiatric disorder and tinea pedis of R foot  PTA, pt was independent w/ all functional mobility w/ no assistive device , ambulates community distances and elevations, ambulates unrestricted distances and all terrain, has 20 MINE, lives alone in an apartment level and works part time  Personal factors affecting pt at time of IE include: inaccessible home environment, ambulating w/ assistive device, stairs to enter home, inability to navigate community distances, inability to navigate level surfaces w/o external assistance, unable to perform dynamic tasks in community and inability to perform current job functions   Please find objective findings from PT assessment regarding body systems outlined above with impairments and limitations including weakness, impaired balance, decreased endurance, gait deviations, pain, decreased activity tolerance, decreased functional mobility tolerance, fall risk and decreased skin integrity, as well as mobility assessment (need for CGA to supervision  assist w/ all phases of mobility when usually ambulating independently, tolerance to only 45 feet + 40 feet of ambulation and need for cueing for mobility technique)  The following objective measures performed on IE also reveal limitations: Barthel Index: 60/100 and Modified Fisher: 3 (moderate disability)  Pt's clinical presentation is currently evolving seen in pt's presentation of need for input for task focus and mobility technique, need for CGA to supervision  assist w/ all phases of mobility when usually mobilizing independently, tolerance to only 45 feet + 40 feet of ambulation, R 2nd toe  pain impacting overall mobility status and ongoing medical assessment  Pt to benefit from continued PT tx to address deficits as defined above and maximize level of functional independent mobility and consistency  From PT/mobility standpoint, recommendation at time of d/c would be Home PT vs  anticipate no needs pending progress in order to  (facilitate return to PLOF)   Barriers to Discharge Inaccessible home environment;Decreased caregiver support   Goals   Patient Goals "to return home"   [facility] Expiration Date 09/14/18   Short Term Goal #1 In 7-10 days: Increase bilateral LE strength 1/2 grade to facilitate independent mobility, Perform all transfers modified independent to improve independence, Ambulate > 150 ft  with least restrictive assistive device modified independent w/o LOB and w/ normalized gait pattern 100% of the time, Navigate 20 stairs modified independent with unilateral handrail to facilitate return to previous living environment, Increase static and dynamic standing balance 1/2 grade to decrease risk for falls, Tolerate 4 hr OOB to faciliate upright tolerance and Improve Barthel Index score to 75 or greater to facilitate independence   Plan   Treatment/Interventions Functional transfer training;LE strengthening/ROM; Elevations; Therapeutic exercise; Endurance training;Patient/family training;Equipment eval/education; Bed mobility;Gait training;Spoke to nursing   PT Frequency 5x/wk   Recommendation   Recommendation Home PT  (home PT as needed )   Equipment Recommended (to be determined )   PT - OK to Discharge No   Additional Comments Pt to demonstrate increased consistency and independence with level surface ambulation and clear stairs prior to discharge    Modified Greenwood Scale   Modified Greenwood Scale 3   Barthel Index   Feeding 10   Bathing 0   Grooming Score 5   Dressing Score 5   Bladder Score 10   Bowels Score 10   Toilet Use Score 10   Transfers (Bed/Chair) Score 10   Mobility (Level Surface) Score 0   Stairs Score 0   Barthel Index Score 60   Sebastian Godoy, PT

## 2018-09-05 VITALS
HEART RATE: 62 BPM | RESPIRATION RATE: 18 BRPM | BODY MASS INDEX: 31.14 KG/M2 | HEIGHT: 68 IN | TEMPERATURE: 98.7 F | OXYGEN SATURATION: 100 % | WEIGHT: 205.47 LBS | DIASTOLIC BLOOD PRESSURE: 64 MMHG | SYSTOLIC BLOOD PRESSURE: 126 MMHG

## 2018-09-05 LAB
BASOPHILS # BLD AUTO: 0.06 THOUSANDS/ΜL (ref 0–0.1)
BASOPHILS NFR BLD AUTO: 1 % (ref 0–1)
EOSINOPHIL # BLD AUTO: 0.15 THOUSAND/ΜL (ref 0–0.61)
EOSINOPHIL NFR BLD AUTO: 2 % (ref 0–6)
ERYTHROCYTE [DISTWIDTH] IN BLOOD BY AUTOMATED COUNT: 12.4 % (ref 11.6–15.1)
HCT VFR BLD AUTO: 40.1 % (ref 36.5–49.3)
HGB BLD-MCNC: 13.4 G/DL (ref 12–17)
IMM GRANULOCYTES # BLD AUTO: 0.04 THOUSAND/UL (ref 0–0.2)
IMM GRANULOCYTES NFR BLD AUTO: 1 % (ref 0–2)
LYMPHOCYTES # BLD AUTO: 2.65 THOUSANDS/ΜL (ref 0.6–4.47)
LYMPHOCYTES NFR BLD AUTO: 35 % (ref 14–44)
MCH RBC QN AUTO: 30.2 PG (ref 26.8–34.3)
MCHC RBC AUTO-ENTMCNC: 33.4 G/DL (ref 31.4–37.4)
MCV RBC AUTO: 90 FL (ref 82–98)
MONOCYTES # BLD AUTO: 0.68 THOUSAND/ΜL (ref 0.17–1.22)
MONOCYTES NFR BLD AUTO: 9 % (ref 4–12)
MRSA NOSE QL CULT: NORMAL
NEUTROPHILS # BLD AUTO: 4.1 THOUSANDS/ΜL (ref 1.85–7.62)
NEUTS SEG NFR BLD AUTO: 52 % (ref 43–75)
NRBC BLD AUTO-RTO: 0 /100 WBCS
PLATELET # BLD AUTO: 205 THOUSANDS/UL (ref 149–390)
PMV BLD AUTO: 9.6 FL (ref 8.9–12.7)
RBC # BLD AUTO: 4.44 MILLION/UL (ref 3.88–5.62)
WBC # BLD AUTO: 7.68 THOUSAND/UL (ref 4.31–10.16)

## 2018-09-05 PROCEDURE — 99238 HOSP IP/OBS DSCHRG MGMT 30/<: CPT | Performed by: INTERNAL MEDICINE

## 2018-09-05 PROCEDURE — 0HBMXZZ EXCISION OF RIGHT FOOT SKIN, EXTERNAL APPROACH: ICD-10-PCS | Performed by: INTERNAL MEDICINE

## 2018-09-05 PROCEDURE — 99232 SBSQ HOSP IP/OBS MODERATE 35: CPT | Performed by: INTERNAL MEDICINE

## 2018-09-05 PROCEDURE — 85025 COMPLETE CBC W/AUTO DIFF WBC: CPT | Performed by: INTERNAL MEDICINE

## 2018-09-05 RX ADMIN — TRIAMCINOLONE ACETONIDE: 5 CREAM TOPICAL at 08:39

## 2018-09-05 RX ADMIN — SODIUM CHLORIDE 100 ML/HR: 0.9 INJECTION, SOLUTION INTRAVENOUS at 05:00

## 2018-09-05 RX ADMIN — CEFAZOLIN SODIUM 1000 MG: 1 SOLUTION INTRAVENOUS at 05:00

## 2018-09-05 RX ADMIN — CEFAZOLIN SODIUM 1000 MG: 1 SOLUTION INTRAVENOUS at 11:37

## 2018-09-05 RX ADMIN — FAMOTIDINE 20 MG: 20 TABLET ORAL at 08:39

## 2018-09-05 NOTE — PROGRESS NOTES
Progress Note - Infectious Disease   Tiana Fitzgerald 33 y o  male MRN: [id number]  Unit/Bed#: -01 Encounter: [id number]      Impression/Recommendations:  1  Recurrent right foot cellulitis  Had shown improvement after receiving IV vancomycin and cefazolin during recent admission  Patient was then transitioned to oral Keflex and ciprofloxacin on recent discharge and his symptoms recurred  Consideration for more resistant organism, such as MRSA  However, patient was not re-initiated on any anti-MRSA coverage and cellulitis appears minimal on my exam today, suggesting that MRSA is less likely  Also I am not seeing any obvious purulence, fluctuance  There is some surrounding erythema, but minimal warmth and tenderness  No associated fevers, leukocytosis  Cellulitis much improved       -change antibiotic to oral Keflex to complete a total of 7 days   -continue with serial foot exams  -close podiatry follow-up as outpatient  -if continues to recur, may need further imaging to evaluate for underlying osteomyelitis      2  Superficial right foot ulceration/maceration  This clinically appears dry  There is no active drainage on exam   Minimal tenderness      -close podiatry follow-up as outpatient  -local wound care  -serial exams     3  Tinea pedis  Likely precipitating cause for #1  Patient was started on topical clotrimazole by Podiatry on last admission      -close podiatry follow-up  -continue topical clotrimazole     4   Schizophrenia      5   Elevated AST/ALT  Recent hepatitis panel was negative  Patient has undergone fairly extensive outpatient workup  Liver ultrasound was suggestive of hepatic steatosis  May also be medication related  Follow-up outpatient      Antibiotics:  Antibiotic D3  Cefazolin D2     Discussed above plan with patient in detail  Discussed above plan with Dr Jennyfer Erazo  Subjective:  Pain much improved  Able to ambulate  Denies fevers, chills, or sweats    Denies nausea, vomiting, or diarrhea  Objective:  Vitals:  HR:  [62-82] 62  Resp:  [18] 18  BP: (118-126)/(64-76) 126/64  SpO2:  [96 %-100 %] 100 %  Temp (24hrs), Av 6 °F (37 °C), Min:98 2 °F (36 8 °C), Max:98 9 °F (37 2 °C)  Current: Temperature: 98 7 °F (37 1 °C)    Physical Exam:   General:  No acute distress  Psychiatric:  Awake and alert  Pulmonary:  Normal respiratory excursion without accessory muscle use  Abdomen:  Soft, nontender  Extremities:  No edema  Skin:  Macerations at right foot 3rd interspace  No purulence, fluctuance  Erythema almost completely resolved  Minimal tenderness  Lab Results:  I have personally reviewed pertinent labs  Results from last 7 days  Lab Units 18  0441 189 18  0507 18   SODIUM mmol/L 140 138 141 135*   POTASSIUM mmol/L 3 9 3 8 4 3 3 9   CHLORIDE mmol/L 105 102 107 98*   CO2 mmol/L 23 24 25 29   BUN mg/dL 9 10 11 7   CREATININE mg/dL 1 10 1 10 1 09 0 96   EGFR ml/min/1 73sq m 92 92 93 108   CALCIUM mg/dL 8 6 9 1 9 0 9 9   AST U/L  --  53*  --  33   ALT U/L  --  106*  --  97*   ALK PHOS U/L  --  65  --  82       Results from last 7 days  Lab Units 18  0503 18  0441 18  2209   WBC Thousand/uL 7 68 8 47 9 71   HEMOGLOBIN g/dL 13 4 14 3 15 1   PLATELETS Thousands/uL 205 255 283       Results from last 7 days  Lab Units 18  1656 18  0742 18  2042 18   BLOOD CULTURE   --   --  No Growth After 5 Days  No Growth After 5 Days  GRAM STAIN RESULT  No polys seen  No organisms seen  --   --   --    MRSA CULTURE ONLY   --  No Methicillin Resistant Staphlyococcus aureus (MRSA) isolated  --   --        Imaging Studies:   I have personally reviewed pertinent imaging study reports and images in PACS  EKG, Pathology, and Other Studies:   I have personally reviewed pertinent reports

## 2018-09-05 NOTE — DISCHARGE SUMMARY
Discharge Summary - Anthony Murguia 33 y o  male MRN: [id number]  Unit/Bed#: -01 Encounter: [id number]    Admission Date:    9/3/2018   Discharge Date:   09/05/18   Admitting Diagnosis:   Cellulitis [L03 90]  Cellulitis of right foot [F74 [id number]]  Admitting Provider:   Raj Tapia MD  Discharge Provider:   Clarisa Phelps MD     Primary Care Physician at Discharge:   Clarisa Phelps MD,[phone]    HPI:   Anthony Murguia is a 33 y o  male who presents to the ER complaining of worsening pain and swellmng to right foot  Patient was admitted here at [facility] for cellulitis and was Dc on keflex and Cipro  He was also seen by podiatry during that admission He states that he has been having worsening pain and is unable to bare weight on his right foot without increased pain  He also reports that he notice purulent drainage  He denies fever, chills, N/V/D  Procedures Performed:   No orders of the defined types were placed in this encounter  Hospital Course:   Patient received Rocephin and Cipro intravenously in the emergency department  He was evaluated by both Podiatry and Infectious Disease who saw no evidence of purulent drainage to suggest MR  or pseudomonal infection  Infectious Disease recommended Ancef which patient received for approximately 24 hours with significant improvement in his cellulitis, pain and swelling  On the day of discharge there was significant desquamation of 3/4 of the 2nd toe that was sharply debrided by myself at the bedside with a 15 blade scalpel  The wound was completely dry with no open ulceration, bleeding or exudate  The erythema on his forefoot had receded considerably and was no longer tender to palpate  Sedimentation rate was obtained on the 1st hospital day at 22 and white blood cell count remained normal throughout the hospitalization    Infectious Disease recommended he be discharged home to complete a 7 day course of cephalexin as well as local care of his tinea pedis with Betadine paint and clotrimazole topically  Consulting Providers   Podiatry and Infectious Disease    Significant Findings, Care, Treatment and Services Provided:   As above    Complications:   None  Physical Exam:  General Appearance:    Alert, cooperative, no distress                                   Extremities:  No pedal edema, calf tenderness  Distal pulses palpable  Desquamation as noted above over 75% of the 2nd toe medially was sharply debrided with mildly erythematous intact skin beneath  Minimal maceration between 1st and 2nd toe related to tinea pedis       Labs:   Lab Results   Component Value Date    WBC 7 68 09/05/2018    RBC 4 44 09/05/2018    HGB 13 4 09/05/2018    HCT 40 1 09/05/2018    MCV 90 09/05/2018    MCH 30 2 09/05/2018    RDW 12 4 09/05/2018     09/05/2018     Lab Results   Component Value Date    CREATININE 1 10 09/04/2018    BUN 9 09/04/2018     09/04/2018    K 3 9 09/04/2018     09/04/2018    CO2 23 09/04/2018    ALKPHOS 65 09/03/2018     (H) 09/03/2018    AST 53 (H) 09/03/2018    BILIDIR 0 09 05/13/2018       Treatments:  antibiotics: Ancef    Discharge Diagnosis:   Principal Problem:    Cellulitis of right foot  Active Problems:    Gastroesophageal reflux disease    Hyperlipidemia    Schizophrenia ([facility] [address] )  Resolved Problems:    * No resolved hospital problems  *      Condition at Discharge:   Good     Code Status: Level 1 - Full Code  Advance Directive and Living Will: <no information>  Power of :    POLST:      Discharge instructions/Information to patient and family:   See after visit summary for information provided to patient and family  Provisions for Follow-Up Care:  See after visit summary for information related to follow-up care and any pertinent home health orders  Disposition:   Home    Planned Readmission:   No    Discharge Statement   I spent 25 minutes discharging the patient   This time was spent on the day of discharge  I had direct contact with the patient on the day of discharge  Additional documentation is required if more than 30 minutes were spent on discharge  Greater than 50% of the total time was spent examining patient, answering all patient questions, arranging and discussing plan of care with patient as well as directly providing post-discharge instructions  Additional time then spent on discharge activities  Discharge Medications:  See after visit summary for reconciled discharge medications provided to patient and family        Stephie Patel MD  9/5/2018,11:18 AM

## 2018-09-05 NOTE — PLAN OF CARE
Problem: DISCHARGE PLANNING - CARE MANAGEMENT  Goal: Discharge to post-acute care or home with appropriate resources  INTERVENTIONS:  - Conduct assessment to determine patient/family and health care team treatment goals, and need for post-acute services based on payer coverage, community resources, and patient preferences, and barriers to discharge  - Address psychosocial, clinical, and financial barriers to discharge as identified in assessment in conjunction with the patient/family and health care team  - Arrange appropriate level of post-acute services according to patient's   needs and preference and payer coverage in collaboration with the physician and health care team  - Communicate with and update the patient/family, physician, and health care team regarding progress on the discharge plan  - Arrange appropriate transportation to post-acute venues   Outcome: Completed Date Met: 09/05/18  Pt to be discharged w/no needs  Is refusing [facility] up for 13:00 to transport home to [facility]  Nurse/pt notified of time

## 2018-09-05 NOTE — PLAN OF CARE
DISCHARGE PLANNING     Discharge to home or other facility with appropriate resources Completed        DISCHARGE PLANNING - CARE MANAGEMENT     Discharge to post-acute care or home with appropriate resources Completed        INFECTION - ADULT     Absence or prevention of progression during hospitalization Completed        Knowledge Deficit     Patient/family/caregiver demonstrates understanding of disease process, treatment plan, medications, and discharge instructions Completed        PAIN - ADULT     Verbalizes/displays adequate comfort level or baseline comfort level Completed        SAFETY ADULT     Patient will remain free of falls Completed     Maintain or return to baseline ADL function Completed     Maintain or return mobility status to optimal level Completed

## 2018-09-06 ENCOUNTER — TRANSITIONAL CARE MANAGEMENT (OUTPATIENT)
Dept: INTERNAL MEDICINE CLINIC | Facility: CLINIC | Age: 28
End: 2018-09-06

## 2018-09-07 DIAGNOSIS — L03.031 CELLULITIS OF TOE OF RIGHT FOOT: Primary | ICD-10-CM

## 2018-09-07 LAB
BACTERIA WND AEROBE CULT: ABNORMAL
BACTERIA WND AEROBE CULT: ABNORMAL
GRAM STN SPEC: ABNORMAL
GRAM STN SPEC: ABNORMAL

## 2018-09-07 RX ORDER — SULFAMETHOXAZOLE AND TRIMETHOPRIM 800; 160 MG/1; MG/1
1 TABLET ORAL EVERY 12 HOURS SCHEDULED
Status: DISCONTINUED | OUTPATIENT
Start: 2018-09-07 | End: 2018-11-01

## 2018-09-07 RX ORDER — SULFAMETHOXAZOLE AND TRIMETHOPRIM 800; 160 MG/1; MG/1
1 TABLET ORAL 2 TIMES DAILY
Qty: 14 TABLET | Refills: 0 | Status: SHIPPED | OUTPATIENT
Start: 2018-09-07 | End: 2018-09-14

## 2018-09-15 ENCOUNTER — OFFICE VISIT (OUTPATIENT)
Dept: INTERNAL MEDICINE CLINIC | Facility: CLINIC | Age: 28
End: 2018-09-15
Payer: MEDICARE

## 2018-09-15 ENCOUNTER — TELEPHONE (OUTPATIENT)
Dept: INTERNAL MEDICINE CLINIC | Facility: CLINIC | Age: 28
End: 2018-09-15

## 2018-09-15 ENCOUNTER — APPOINTMENT (OUTPATIENT)
Dept: LAB | Facility: CLINIC | Age: 28
End: 2018-09-15
Payer: MEDICARE

## 2018-09-15 VITALS
DIASTOLIC BLOOD PRESSURE: 72 MMHG | HEIGHT: 68 IN | BODY MASS INDEX: 32.58 KG/M2 | WEIGHT: 215 LBS | SYSTOLIC BLOOD PRESSURE: 124 MMHG | HEART RATE: 80 BPM | RESPIRATION RATE: 12 BRPM

## 2018-09-15 DIAGNOSIS — K76.0 NON-ALCOHOLIC FATTY LIVER DISEASE: ICD-10-CM

## 2018-09-15 DIAGNOSIS — B35.1 ONYCHOMYCOSIS: ICD-10-CM

## 2018-09-15 DIAGNOSIS — B35.3 TINEA PEDIS OF RIGHT FOOT: Primary | ICD-10-CM

## 2018-09-15 DIAGNOSIS — L03.115 CELLULITIS OF RIGHT FOOT: ICD-10-CM

## 2018-09-15 DIAGNOSIS — Z22.322 MRSA (METHICILLIN RESISTANT STAPH AUREUS) CULTURE POSITIVE: ICD-10-CM

## 2018-09-15 LAB
ALBUMIN SERPL BCP-MCNC: 4 G/DL (ref 3.5–5)
ALP SERPL-CCNC: 73 U/L (ref 46–116)
ALT SERPL W P-5'-P-CCNC: 108 U/L (ref 12–78)
ANION GAP SERPL CALCULATED.3IONS-SCNC: 7 MMOL/L (ref 4–13)
AST SERPL W P-5'-P-CCNC: 41 U/L (ref 5–45)
BASOPHILS # BLD AUTO: 0.06 THOUSANDS/ΜL (ref 0–0.1)
BASOPHILS NFR BLD AUTO: 1 % (ref 0–1)
BILIRUB SERPL-MCNC: 0.33 MG/DL (ref 0.2–1)
BUN SERPL-MCNC: 8 MG/DL (ref 5–25)
CALCIUM SERPL-MCNC: 9 MG/DL (ref 8.3–10.1)
CHLORIDE SERPL-SCNC: 105 MMOL/L (ref 100–108)
CO2 SERPL-SCNC: 25 MMOL/L (ref 21–32)
CREAT SERPL-MCNC: 0.96 MG/DL (ref 0.6–1.3)
EOSINOPHIL # BLD AUTO: 0.05 THOUSAND/ΜL (ref 0–0.61)
EOSINOPHIL NFR BLD AUTO: 1 % (ref 0–6)
ERYTHROCYTE [DISTWIDTH] IN BLOOD BY AUTOMATED COUNT: 13.1 % (ref 11.6–15.1)
GFR SERPL CREATININE-BSD FRML MDRD: 108 ML/MIN/1.73SQ M
GLUCOSE P FAST SERPL-MCNC: 87 MG/DL (ref 65–99)
HCT VFR BLD AUTO: 44.2 % (ref 36.5–49.3)
HGB BLD-MCNC: 14.8 G/DL (ref 12–17)
IMM GRANULOCYTES # BLD AUTO: 0.05 THOUSAND/UL (ref 0–0.2)
IMM GRANULOCYTES NFR BLD AUTO: 1 % (ref 0–2)
LYMPHOCYTES # BLD AUTO: 1.54 THOUSANDS/ΜL (ref 0.6–4.47)
LYMPHOCYTES NFR BLD AUTO: 20 % (ref 14–44)
MCH RBC QN AUTO: 30.3 PG (ref 26.8–34.3)
MCHC RBC AUTO-ENTMCNC: 33.5 G/DL (ref 31.4–37.4)
MCV RBC AUTO: 90 FL (ref 82–98)
MONOCYTES # BLD AUTO: 0.63 THOUSAND/ΜL (ref 0.17–1.22)
MONOCYTES NFR BLD AUTO: 8 % (ref 4–12)
NEUTROPHILS # BLD AUTO: 5.23 THOUSANDS/ΜL (ref 1.85–7.62)
NEUTS SEG NFR BLD AUTO: 69 % (ref 43–75)
NRBC BLD AUTO-RTO: 0 /100 WBCS
PLATELET # BLD AUTO: 265 THOUSANDS/UL (ref 149–390)
PMV BLD AUTO: 9.4 FL (ref 8.9–12.7)
POTASSIUM SERPL-SCNC: 4 MMOL/L (ref 3.5–5.3)
PROT SERPL-MCNC: 8.1 G/DL (ref 6.4–8.2)
RBC # BLD AUTO: 4.89 MILLION/UL (ref 3.88–5.62)
SODIUM SERPL-SCNC: 137 MMOL/L (ref 136–145)
WBC # BLD AUTO: 7.56 THOUSAND/UL (ref 4.31–10.16)

## 2018-09-15 PROCEDURE — 99495 TRANSJ CARE MGMT MOD F2F 14D: CPT | Performed by: INTERNAL MEDICINE

## 2018-09-15 PROCEDURE — 80053 COMPREHEN METABOLIC PANEL: CPT

## 2018-09-15 PROCEDURE — 85025 COMPLETE CBC W/AUTO DIFF WBC: CPT

## 2018-09-15 PROCEDURE — 36415 COLL VENOUS BLD VENIPUNCTURE: CPT

## 2018-09-15 RX ORDER — CHLORHEXIDINE GLUCONATE 4 G/100ML
SOLUTION TOPICAL
Qty: 946 ML | Refills: 1 | Status: SHIPPED | OUTPATIENT
Start: 2018-09-15 | End: 2018-10-03

## 2018-09-15 RX ORDER — MUPIROCIN CALCIUM 20 MG/G
CREAM TOPICAL
Qty: 15 G | Refills: 0 | Status: SHIPPED | OUTPATIENT
Start: 2018-09-15 | End: 2018-09-15

## 2018-09-15 NOTE — TELEPHONE ENCOUNTER
[facility] called to say Mupirocin (Bactroban) 2% cream is not covered, insurance only covers ointment   Please advise [phone]

## 2018-09-15 NOTE — PROGRESS NOTES
Assessment/Plan:     No problem-specific Assessment & Plan notes found for this encounter  Diagnoses and all orders for this visit:    Tinea pedis of right foot  -     Ambulatory referral to Podiatry; Future    Cellulitis of right foot  -     Ambulatory referral to Podiatry; Future    MRSA (methicillin resistant staph aureus) culture positive  -     mupirocin (BACTROBAN) 2 % cream; Apply to nares with cotton swab twice daily for 5 days  -     chlorhexidine (HIBICLENS) 4 % external liquid; Wash daily from the neck down for 5 days  -     Ambulatory referral to Podiatry; Future    Non-alcoholic fatty liver disease  -     CBC and differential; Future  -     Comprehensive metabolic panel; Future    Onychomycosis       Patient Instructions    Cellulitis and tinea pedis both appear to be improving nicely  He has 1 more day of antibiotics that he should complete  Given that his culture was positive for both Enterococcus and  MRSA he was treated with Keflex and Bactrim  He is advised to continue with aggressive treatment of athlete's foot including daily use of Lotrimin to affected areas and foot powder in general   Do not wear the same shoes 2 days in a row  Apply foot powder into the issue after removing it  Avoid cotton socks  Follow up with Podiatry has been arranged  Interestingly his nasal swab was negative for MRSA but wound culture positive  We discussed  MRSA decontamination with nasal mupirocin twice daily for 5 days concordant with chlorhexidine body wash from neck down for 5 days, , wash all your sheets  and towels in hot water over the next 1 or 2 days  onychomycosis of the nail-patient has fatty liver disease and while hospitalized LFTs were abnormal   We discussed consideration for treatment with Lamisil for mycotic nails  If liver enzymes are okay I can call that when in    Patient understands he needs to take the antibiotic for 3 months and it will take approximately 1 year for the nail to completely grow out and look normal       Subjective:     Patient ID: Erick Hatfield is a 33 y o  male  Feeling well though when he is walking on his foot more there is more redness of the 2nd toe  There has been no fevers or chills  Skin breakdown has improved  He completed the Betadine paints and continues with Lotrimin twice daily  Review of Systems   Constitutional: Negative for appetite change, chills, diaphoresis, fatigue, fever and unexpected weight change  HENT: Negative for congestion, hearing loss and rhinorrhea  Eyes: Negative for visual disturbance  Respiratory: Negative for cough, chest tightness, shortness of breath and wheezing  Cardiovascular: Negative for chest pain, palpitations and leg swelling  Gastrointestinal: Negative for abdominal pain and blood in stool  Endocrine: Negative for cold intolerance, heat intolerance, polydipsia and polyuria  Genitourinary: Negative for difficulty urinating, dysuria, frequency and urgency  Musculoskeletal: Negative for arthralgias and myalgias  Skin: Negative for rash  Neurological: Negative for dizziness, weakness, light-headedness and headaches  Hematological: Does not bruise/bleed easily  Psychiatric/Behavioral: Negative for dysphoric mood and sleep disturbance  Objective:     Physical Exam   Constitutional: He appears well-developed and well-nourished  Skin:   Right 2nd toe is mildly erythematous and slightly swollen, blanching, there is no further maceration or skin breakdown between the toes, no open areas  All toes are thickened and mycotic         Vitals:    09/15/18 0928   BP: 124/72   BP Location: Left arm   Patient Position: Sitting   Pulse: 80   Resp: 12   Weight: 97 5 kg (215 lb)   Height: 5' 8" (1 727 m)       Transitional Care Management Review:  Erick Hatfield is a 33 y o  male here for TCM follow up       During the TCM phone call patient stated:    Date and time hospital follow up call was made: 9/6/2018 10:48 AM  Hospital care reviewed:  Records reviewed  Patient [facility]ital[facility] at:  [facility]  Date of admission:  8/29/18  Date of discharge:  8/31/18  Diagnosis:  Toe swelling; cellulitis right foot  Disposition:  Home  Scheduled for follow up?:  (Comment: no TCM appt, pt was readmitted)  Did you obtain your prescribed medications:  Yes (Comment: cephalexin 500 mg capsule; ciprofloxacin 500 mg tablet; clotrimazole 1 % cream; povidone-iodine 10 % external solution)  Do you need help managing your perscriptions or medications:  No  Are you recieving outpatient services:  No  Are you recieving home care services:  No             Radha Huang MD

## 2018-09-16 DIAGNOSIS — E78.2 MIXED HYPERLIPIDEMIA: ICD-10-CM

## 2018-09-16 DIAGNOSIS — K76.0 NAFLD (NONALCOHOLIC FATTY LIVER DISEASE): Primary | ICD-10-CM

## 2018-09-16 DIAGNOSIS — R73.01 IMPAIRED FASTING GLUCOSE: ICD-10-CM

## 2018-09-17 ENCOUNTER — TELEPHONE (OUTPATIENT)
Dept: INTERNAL MEDICINE CLINIC | Facility: CLINIC | Age: 28
End: 2018-09-17

## 2018-09-17 NOTE — TELEPHONE ENCOUNTER
----- Message from Anna Herrera MD sent at 9/16/2018  7:05 AM EDT -----  Notify-liver enzymes have improved, but still not normal   He should concentrate on low fat/chol diet and wt loss, and recheck in 3 mos and f/u

## 2018-10-03 ENCOUNTER — OFFICE VISIT (OUTPATIENT)
Dept: INTERNAL MEDICINE CLINIC | Facility: CLINIC | Age: 28
End: 2018-10-03
Payer: MEDICARE

## 2018-10-03 VITALS
WEIGHT: 210.8 LBS | BODY MASS INDEX: 31.95 KG/M2 | DIASTOLIC BLOOD PRESSURE: 84 MMHG | SYSTOLIC BLOOD PRESSURE: 140 MMHG | RESPIRATION RATE: 12 BRPM | HEIGHT: 68 IN | HEART RATE: 76 BPM

## 2018-10-03 DIAGNOSIS — E66.01 MORBID OBESITY DUE TO EXCESS CALORIES (HCC): ICD-10-CM

## 2018-10-03 DIAGNOSIS — K76.0 NON-ALCOHOLIC FATTY LIVER DISEASE: Primary | ICD-10-CM

## 2018-10-03 DIAGNOSIS — M79.89 TOE SWELLING: ICD-10-CM

## 2018-10-03 DIAGNOSIS — B35.1 ONYCHOMYCOSIS: ICD-10-CM

## 2018-10-03 PROBLEM — B35.3 TINEA PEDIS OF RIGHT FOOT: Status: RESOLVED | Noted: 2018-08-30 | Resolved: 2018-10-03

## 2018-10-03 PROBLEM — L03.115 CELLULITIS OF RIGHT FOOT: Status: RESOLVED | Noted: 2018-09-04 | Resolved: 2018-10-03

## 2018-10-03 PROBLEM — L03.119 CELLULITIS OF EXTREMITY: Status: RESOLVED | Noted: 2018-08-29 | Resolved: 2018-10-03

## 2018-10-03 PROCEDURE — 99213 OFFICE O/P EST LOW 20 MIN: CPT | Performed by: INTERNAL MEDICINE

## 2018-10-03 NOTE — PROGRESS NOTES
Assessment/Plan:    Patient Instructions     Tinea pedis and cellulitis has resolved, onychomycosis persists but elevated transaminases preclude treatment with Lamisil at this time, continue with daily foot care and proper hygiene as discussed  Follow-up after labs in December, sooner as needed    Obesity - continue with weight loss efforts, small frequent meals throughout the day with regular exercise at least 30 min a day 5 days a week is recommended  Diagnoses and all orders for this visit:    Non-alcoholic fatty liver disease    Toe swelling         Subjective:      Patient ID: Keyla Guerrero is a 33 y o  male    Foot infection has been healing, there is still black discoloration on the right great toenail worsened over the last 1 month  The nail does appear to be growing out  No further maceration between the toes  He continues with the Lotrimin  Once daily  He continues with foot hygiene as discussed previously including foot powder in issues and on the feet daily as well as not wearing the same she used to days in a row  Obesity-walking 2-3 mi 2-3x per week  Eating 2 meals per day, trying to limit junk food  Current Outpatient Prescriptions:     clotrimazole (LOTRIMIN) 1 % cream, Apply between affected toes bid x 1 week after completing 1 wk betadine, Disp: 30 g, Rfl: 0    OLANZapine (ZyPREXA) 15 mg tablet, Take 10 mg by mouth daily at bedtime  , Disp: , Rfl:     ranitidine (ZANTAC) 150 mg tablet, Take 1 tablet (150 mg total) by mouth 2 (two) times a day, Disp: 60 tablet, Rfl: 3  No current facility-administered medications for this visit       Facility-Administered Medications Ordered in Other Visits:     sulfamethoxazole-trimethoprim (BACTRIM DS) 800-160 mg per tablet 1 tablet, 1 tablet, Oral, Q12H [address], Jimbo Sutherland MD    Recent Results (from the past 1008 hour(s))   CBC and differential    Collection Time: 08/29/18  8:29 PM   Result Value Ref Range    WBC 13 02 (H) 4 31 - 10 16 Thousand/uL    RBC 5 23 3 88 - 5 62 Million/uL    Hemoglobin 15 8 12 0 - 17 0 g/dL    Hematocrit 46 1 36 5 - 49 3 %    MCV 88 82 - 98 fL    MCH 30 2 26 8 - 34 3 pg    MCHC 34 3 31 4 - 37 4 g/dL    RDW 12 9 11 6 - 15 1 %    MPV 9 0 8 9 - 12 7 fL    Platelets 016 839 - 318 Thousands/uL    nRBC 0 /100 WBCs    Neutrophils Relative 74 43 - 75 %    Immat GRANS % 1 0 - 2 %    Lymphocytes Relative 17 14 - 44 %    Monocytes Relative 8 4 - 12 %    Eosinophils Relative 0 0 - 6 %    Basophils Relative 0 0 - 1 %    Neutrophils Absolute 9 73 (H) 1 85 - 7 62 Thousands/µL    Immature Grans Absolute 0 06 0 00 - 0 20 Thousand/uL    Lymphocytes Absolute 2 20 0 60 - 4 47 Thousands/µL    Monocytes Absolute 0 97 0 17 - 1 22 Thousand/µL    Eosinophils Absolute 0 02 0 00 - 0 61 Thousand/µL    Basophils Absolute 0 04 0 00 - 0 10 Thousands/µL   Blood culture #2    Collection Time: 08/29/18  8:29 PM   Result Value Ref Range    Blood Culture No Growth After 5 Days  Comprehensive metabolic panel    Collection Time: 08/29/18  8:29 PM   Result Value Ref Range    Sodium 135 (L) 136 - 145 mmol/L    Potassium 3 9 3 5 - 5 3 mmol/L    Chloride 98 (L) 100 - 108 mmol/L    CO2 29 21 - 32 mmol/L    ANION GAP 8 4 - 13 mmol/L    BUN 7 5 - 25 mg/dL    Creatinine 0 96 0 60 - 1 30 mg/dL    Glucose 104 65 - 140 mg/dL    Calcium 9 9 8 3 - 10 1 mg/dL    AST 33 5 - 45 U/L    ALT 97 (H) 12 - 78 U/L    Alkaline Phosphatase 82 46 - 116 U/L    Total Protein 9 1 (H) 6 4 - 8 2 g/dL    Albumin 4 5 3 5 - 5 0 g/dL    Total Bilirubin 0 90 0 20 - 1 00 mg/dL    eGFR 108 ml/min/1 73sq m   Protime-INR    Collection Time: 08/29/18  8:29 PM   Result Value Ref Range    Protime 13 5 11 8 - 14 2 seconds    INR 1 04 0 86 - 1 17   APTT    Collection Time: 08/29/18  8:29 PM   Result Value Ref Range    PTT 28 24 - 36 seconds   Blood culture #1    Collection Time: 08/29/18  8:42 PM   Result Value Ref Range    Blood Culture No Growth After 5 Days      Basic metabolic panel Collection Time: 08/30/18  5:07 AM   Result Value Ref Range    Sodium 141 136 - 145 mmol/L    Potassium 4 3 3 5 - 5 3 mmol/L    Chloride 107 100 - 108 mmol/L    CO2 25 21 - 32 mmol/L    ANION GAP 9 4 - 13 mmol/L    BUN 11 5 - 25 mg/dL    Creatinine 1 09 0 60 - 1 30 mg/dL    Glucose 102 65 - 140 mg/dL    Calcium 9 0 8 3 - 10 1 mg/dL    eGFR 93 ml/min/1 73sq m   CBC and differential    Collection Time: 08/30/18  5:07 AM   Result Value Ref Range    WBC 9 21 4 31 - 10 16 Thousand/uL    RBC 4 56 3 88 - 5 62 Million/uL    Hemoglobin 13 8 12 0 - 17 0 g/dL    Hematocrit 40 8 36 5 - 49 3 %    MCV 90 82 - 98 fL    MCH 30 3 26 8 - 34 3 pg    MCHC 33 8 31 4 - 37 4 g/dL    RDW 12 9 11 6 - 15 1 %    MPV 9 2 8 9 - 12 7 fL    Platelets 014 438 - 519 Thousands/uL    nRBC 0 /100 WBCs    Neutrophils Relative 66 43 - 75 %    Immat GRANS % 1 0 - 2 %    Lymphocytes Relative 23 14 - 44 %    Monocytes Relative 9 4 - 12 %    Eosinophils Relative 1 0 - 6 %    Basophils Relative 0 0 - 1 %    Neutrophils Absolute 6 06 1 85 - 7 62 Thousands/µL    Immature Grans Absolute 0 06 0 00 - 0 20 Thousand/uL    Lymphocytes Absolute 2 11 0 60 - 4 47 Thousands/µL    Monocytes Absolute 0 85 0 17 - 1 22 Thousand/µL    Eosinophils Absolute 0 10 0 00 - 0 61 Thousand/µL    Basophils Absolute 0 03 0 00 - 0 10 Thousands/µL   Hemoglobin A1C w/ EAG Estimation    Collection Time: 08/30/18  5:07 AM   Result Value Ref Range    Hemoglobin A1C 5 2 4 2 - 6 3 %     mg/dl   CBC and differential    Collection Time: 09/03/18 10:09 PM   Result Value Ref Range    WBC 9 71 4 31 - 10 16 Thousand/uL    RBC 5 03 3 88 - 5 62 Million/uL    Hemoglobin 15 1 12 0 - 17 0 g/dL    Hematocrit 43 4 36 5 - 49 3 %    MCV 86 82 - 98 fL    MCH 30 0 26 8 - 34 3 pg    MCHC 34 8 31 4 - 37 4 g/dL    RDW 12 5 11 6 - 15 1 %    MPV 8 8 (L) 8 9 - 12 7 fL    Platelets 485 427 - 830 Thousands/uL    nRBC 0 /100 WBCs    Neutrophils Relative 73 43 - 75 %    Immat GRANS % 1 0 - 2 %    Lymphocytes Relative 19 14 - 44 %    Monocytes Relative 6 4 - 12 %    Eosinophils Relative 0 0 - 6 %    Basophils Relative 1 0 - 1 %    Neutrophils Absolute 7 09 1 85 - 7 62 Thousands/µL    Immature Grans Absolute 0 09 0 00 - 0 20 Thousand/uL    Lymphocytes Absolute 1 85 0 60 - 4 47 Thousands/µL    Monocytes Absolute 0 60 0 17 - 1 22 Thousand/µL    Eosinophils Absolute 0 03 0 00 - 0 61 Thousand/µL    Basophils Absolute 0 05 0 00 - 0 10 Thousands/µL   Comprehensive metabolic panel    Collection Time: 09/03/18 10:09 PM   Result Value Ref Range    Sodium 138 136 - 145 mmol/L    Potassium 3 8 3 5 - 5 3 mmol/L    Chloride 102 100 - 108 mmol/L    CO2 24 21 - 32 mmol/L    ANION GAP 12 4 - 13 mmol/L    BUN 10 5 - 25 mg/dL    Creatinine 1 10 0 60 - 1 30 mg/dL    Glucose 107 65 - 140 mg/dL    Calcium 9 1 8 3 - 10 1 mg/dL    AST 53 (H) 5 - 45 U/L     (H) 12 - 78 U/L    Alkaline Phosphatase 65 46 - 116 U/L    Total Protein 8 5 (H) 6 4 - 8 2 g/dL    Albumin 4 1 3 5 - 5 0 g/dL    Total Bilirubin 0 40 0 20 - 1 00 mg/dL    eGFR 92 ml/min/1 73sq m   Lactic acid, plasma    Collection Time: 09/03/18 10:09 PM   Result Value Ref Range    LACTIC ACID 1 6 0 5 - 2 0 mmol/L   Basic metabolic panel    Collection Time: 09/04/18  4:41 AM   Result Value Ref Range    Sodium 140 136 - 145 mmol/L    Potassium 3 9 3 5 - 5 3 mmol/L    Chloride 105 100 - 108 mmol/L    CO2 23 21 - 32 mmol/L    ANION GAP 12 4 - 13 mmol/L    BUN 9 5 - 25 mg/dL    Creatinine 1 10 0 60 - 1 30 mg/dL    Glucose 92 65 - 140 mg/dL    Calcium 8 6 8 3 - 10 1 mg/dL    eGFR 92 ml/min/1 73sq m   Magnesium    Collection Time: 09/04/18  4:41 AM   Result Value Ref Range    Magnesium 2 0 1 6 - 2 6 mg/dL   Phosphorus    Collection Time: 09/04/18  4:41 AM   Result Value Ref Range    Phosphorus 3 6 2 7 - 4 5 mg/dL   CBC and differential    Collection Time: 09/04/18  4:41 AM   Result Value Ref Range    WBC 8 47 4 31 - 10 16 Thousand/uL    RBC 4 73 3 88 - 5 62 Million/uL    Hemoglobin 14 3 12 0 - 17 0 g/dL    Hematocrit 41 9 36 5 - 49 3 %    MCV 89 82 - 98 fL    MCH 30 2 26 8 - 34 3 pg    MCHC 34 1 31 4 - 37 4 g/dL    RDW 12 4 11 6 - 15 1 %    MPV 8 6 (L) 8 9 - 12 7 fL    Platelets 850 636 - 908 Thousands/uL    nRBC 0 /100 WBCs    Neutrophils Relative 56 43 - 75 %    Immat GRANS % 1 0 - 2 %    Lymphocytes Relative 34 14 - 44 %    Monocytes Relative 7 4 - 12 %    Eosinophils Relative 1 0 - 6 %    Basophils Relative 1 0 - 1 %    Neutrophils Absolute 4 72 1 85 - 7 62 Thousands/µL    Immature Grans Absolute 0 08 0 00 - 0 20 Thousand/uL    Lymphocytes Absolute 2 91 0 60 - 4 47 Thousands/µL    Monocytes Absolute 0 63 0 17 - 1 22 Thousand/µL    Eosinophils Absolute 0 08 0 00 - 0 61 Thousand/µL    Basophils Absolute 0 05 0 00 - 0 10 Thousands/µL   MRSA culture    Collection Time: 09/04/18  7:42 AM   Result Value Ref Range    MRSA Culture Only       No Methicillin Resistant Staphlyococcus aureus (MRSA) isolated   Sedimentation rate, automated    Collection Time: 09/04/18 10:21 AM   Result Value Ref Range    Sed Rate 22 (H) 0 - 10 mm/hour   Wound culture and Gram stain    Collection Time: 09/04/18  4:56 PM   Result Value Ref Range    Wound Culture (A)      1+ Growth of Methicillin Resistant Staphylococcus aureus    Wound Culture 1+ Growth of Enterococcus faecalis (A)     Gram Stain Result No polys seen     Gram Stain Result No organisms seen        Susceptibility    Enterococcus faecalis - JESUS     ZID Performed Yes       Ampicillin ($$) <=2 00 Susceptible ug/ml     Vancomycin ($) 1 00 Susceptible ug/ml    Methicillin Resistant Staphylococcus aureus - JESUS     Ampicillin ($$) >8 00 Resistant ug/ml     Cefazolin ($) <=4 00 Resistant ug/ml     Clindamycin ($)* 0 50 Susceptible ug/ml      * The [employer] test is Negative  This isolate is sensitive to Clindamycin         Erythromycin ($$$$) >4 00 Resistant ug/ml     Gentamicin ($$) <=1 Susceptible ug/ml     Oxacillin >2 00 Resistant ug/ml     Tetracycline <=2 Susceptible ug/ml     Trimethoprim + Sulfamethoxazole ($$$) <=0 5/9 5 Susceptible ug/ml     Vancomycin ($) 1 00 Susceptible ug/ml   CBC and differential    Collection Time: 09/05/18  5:03 AM   Result Value Ref Range    WBC 7 68 4 31 - 10 16 Thousand/uL    RBC 4 44 3 88 - 5 62 Million/uL    Hemoglobin 13 4 12 0 - 17 0 g/dL    Hematocrit 40 1 36 5 - 49 3 %    MCV 90 82 - 98 fL    MCH 30 2 26 8 - 34 3 pg    MCHC 33 4 31 4 - 37 4 g/dL    RDW 12 4 11 6 - 15 1 %    MPV 9 6 8 9 - 12 7 fL    Platelets 943 259 - 424 Thousands/uL    nRBC 0 /100 WBCs    Neutrophils Relative 52 43 - 75 %    Immat GRANS % 1 0 - 2 %    Lymphocytes Relative 35 14 - 44 %    Monocytes Relative 9 4 - 12 %    Eosinophils Relative 2 0 - 6 %    Basophils Relative 1 0 - 1 %    Neutrophils Absolute 4 10 1 85 - 7 62 Thousands/µL    Immature Grans Absolute 0 04 0 00 - 0 20 Thousand/uL    Lymphocytes Absolute 2 65 0 60 - 4 47 Thousands/µL    Monocytes Absolute 0 68 0 17 - 1 22 Thousand/µL    Eosinophils Absolute 0 15 0 00 - 0 61 Thousand/µL    Basophils Absolute 0 06 0 00 - 0 10 Thousands/µL   CBC and differential    Collection Time: 09/15/18 10:11 AM   Result Value Ref Range    WBC 7 56 4 31 - 10 16 Thousand/uL    RBC 4 89 3 88 - 5 62 Million/uL    Hemoglobin 14 8 12 0 - 17 0 g/dL    Hematocrit 44 2 36 5 - 49 3 %    MCV 90 82 - 98 fL    MCH 30 3 26 8 - 34 3 pg    MCHC 33 5 31 4 - 37 4 g/dL    RDW 13 1 11 6 - 15 1 %    MPV 9 4 8 9 - 12 7 fL    Platelets 602 099 - 917 Thousands/uL    nRBC 0 /100 WBCs    Neutrophils Relative 69 43 - 75 %    Immat GRANS % 1 0 - 2 %    Lymphocytes Relative 20 14 - 44 %    Monocytes Relative 8 4 - 12 %    Eosinophils Relative 1 0 - 6 %    Basophils Relative 1 0 - 1 %    Neutrophils Absolute 5 23 1 85 - 7 62 Thousands/µL    Immature Grans Absolute 0 05 0 00 - 0 20 Thousand/uL    Lymphocytes Absolute 1 54 0 60 - 4 47 Thousands/µL    Monocytes Absolute 0 63 0 17 - 1 22 Thousand/µL    Eosinophils Absolute 0 05 0 00 - 0 61 Thousand/µL Basophils Absolute 0 06 0 00 - 0 10 Thousands/µL   Comprehensive metabolic panel    Collection Time: 09/15/18 10:11 AM   Result Value Ref Range    Sodium 137 136 - 145 mmol/L    Potassium 4 0 3 5 - 5 3 mmol/L    Chloride 105 100 - 108 mmol/L    CO2 25 21 - 32 mmol/L    ANION GAP 7 4 - 13 mmol/L    BUN 8 5 - 25 mg/dL    Creatinine 0 96 0 60 - 1 30 mg/dL    Glucose, Fasting 87 65 - 99 mg/dL    Calcium 9 0 8 3 - 10 1 mg/dL    AST 41 5 - 45 U/L     (H) 12 - 78 U/L    Alkaline Phosphatase 73 46 - 116 U/L    Total Protein 8 1 6 4 - 8 2 g/dL    Albumin 4 0 3 5 - 5 0 g/dL    Total Bilirubin 0 33 0 20 - 1 00 mg/dL    eGFR 108 ml/min/1 73sq m       The following portions of the patient's history were reviewed and updated as appropriate: allergies, current medications, past family history, past medical history, past social history, past surgical history and problem list      Review of Systems   Constitutional: Negative for appetite change, chills, diaphoresis, fatigue, fever and unexpected weight change  HENT: Negative for congestion, hearing loss and rhinorrhea  Eyes: Negative for visual disturbance  Respiratory: Negative for cough, chest tightness, shortness of breath and wheezing  Cardiovascular: Negative for chest pain, palpitations and leg swelling  Gastrointestinal: Negative for abdominal pain and blood in stool  Endocrine: Negative for cold intolerance, heat intolerance, polydipsia and polyuria  Genitourinary: Negative for difficulty urinating, dysuria, frequency and urgency  Musculoskeletal: Negative for arthralgias and myalgias  Skin: Negative for rash  Neurological: Negative for dizziness, weakness, light-headedness and headaches  Hematological: Does not bruise/bleed easily  Psychiatric/Behavioral: Negative for dysphoric mood and sleep disturbance           Objective:      Vitals:    10/03/18 1301   BP: 140/84   Pulse: 76   Resp: 12          Physical Exam   Constitutional: He appears well-developed and well-nourished  Skin:   Right 2nd toe is mildly erythematous and slightly swollen, blanching, there is no further maceration or skin breakdown between the toes, no open areas  Great toenail w/ black discoloration, thickened, but proximally clear

## 2018-10-03 NOTE — PATIENT INSTRUCTIONS
Tinea pedis and cellulitis has resolved, onychomycosis persists but elevated transaminases preclude treatment with Lamisil at this time, continue with daily foot care and proper hygiene as discussed  Follow-up after labs in December, sooner as needed    Obesity - continue with weight loss efforts, small frequent meals throughout the day with regular exercise at least 30 min a day 5 days a week is recommended

## 2018-10-16 ENCOUNTER — OFFICE VISIT (OUTPATIENT)
Dept: INTERNAL MEDICINE CLINIC | Facility: CLINIC | Age: 28
End: 2018-10-16
Payer: MEDICARE

## 2018-10-16 VITALS
WEIGHT: 209.6 LBS | HEIGHT: 68 IN | BODY MASS INDEX: 31.77 KG/M2 | HEART RATE: 70 BPM | SYSTOLIC BLOOD PRESSURE: 120 MMHG | DIASTOLIC BLOOD PRESSURE: 84 MMHG

## 2018-10-16 DIAGNOSIS — H66.001 ACUTE SUPPURATIVE OTITIS MEDIA OF RIGHT EAR WITHOUT SPONTANEOUS RUPTURE OF TYMPANIC MEMBRANE, RECURRENCE NOT SPECIFIED: ICD-10-CM

## 2018-10-16 DIAGNOSIS — J06.9 VIRAL URI WITH COUGH: Primary | ICD-10-CM

## 2018-10-16 PROCEDURE — 99213 OFFICE O/P EST LOW 20 MIN: CPT | Performed by: INTERNAL MEDICINE

## 2018-10-16 RX ORDER — AMOXICILLIN AND CLAVULANATE POTASSIUM 875; 125 MG/1; MG/1
1 TABLET, FILM COATED ORAL 2 TIMES DAILY
Qty: 14 TABLET | Refills: 0 | Status: SHIPPED | OUTPATIENT
Start: 2018-10-16 | End: 2018-10-23

## 2018-10-16 NOTE — PROGRESS NOTES
Assessment/Plan:    Patient Instructions   Symptoms most consistent with viral upper respiratory infection with eustachian tube dysfunction  Physical exam consistent with acute otitis media of the right ear though he has no symptoms at this time  I recommended over-the-counter pseudoephedrine 120 mg twice daily, obtain this from the pharmacist without a prescription  Take pseudoephedrine twice daily for the next 4-6 days  If ear pain develops or there is fevers or chills or hearing loss despite the pseudoephedrine then start on the antibiotic, Augmentin 875 mg twice daily  In either case follow-up in 10 days to assure improvement  Diagnoses and all orders for this visit:    Viral URI with cough    Acute suppurative otitis media of right ear without spontaneous rupture of tympanic membrane, recurrence not specified  -     amoxicillin-clavulanate (AUGMENTIN) 875-125 mg per tablet; Take 1 tablet by mouth 2 (two) times a day for 7 days         Subjective:      Patient ID: Gema Swain is a 33 y o  male    Started w/ rn and cough w/ lots of mucus over 5 days ago  Hearing was impaired, like a fishbowl on his head, now improved  Taking robitussin w/o relief  Feels like it is improving  Cough   Associated symptoms include postnasal drip and rhinorrhea  Pertinent negatives include no chest pain, chills, ear pain, fever, headaches, myalgias, rash, shortness of breath or wheezing           Current Outpatient Prescriptions:     clotrimazole (LOTRIMIN) 1 % cream, Apply between affected toes bid x 1 week after completing 1 wk betadine, Disp: 30 g, Rfl: 0    OLANZapine (ZyPREXA) 15 mg tablet, Take 10 mg by mouth daily at bedtime  , Disp: , Rfl:     ranitidine (ZANTAC) 150 mg tablet, Take 1 tablet (150 mg total) by mouth 2 (two) times a day, Disp: 60 tablet, Rfl: 3    amoxicillin-clavulanate (AUGMENTIN) 875-125 mg per tablet, Take 1 tablet by mouth 2 (two) times a day for 7 days, Disp: 14 tablet, Rfl: 0  No current facility-administered medications for this visit       Facility-Administered Medications Ordered in Other Visits:     sulfamethoxazole-trimethoprim (BACTRIM DS) 800-160 mg per tablet 1 tablet, 1 tablet, Oral, Q12H [facility], Kingsley Barajas MD    Recent Results (from the past 1008 hour(s))   CBC and differential    Collection Time: 09/05/18  5:03 AM   Result Value Ref Range    WBC 7 68 4 31 - 10 16 Thousand/uL    RBC 4 44 3 88 - 5 62 Million/uL    Hemoglobin 13 4 12 0 - 17 0 g/dL    Hematocrit 40 1 36 5 - 49 3 %    MCV 90 82 - 98 fL    MCH 30 2 26 8 - 34 3 pg    MCHC 33 4 31 4 - 37 4 g/dL    RDW 12 4 11 6 - 15 1 %    MPV 9 6 8 9 - 12 7 fL    Platelets 539 506 - 894 Thousands/uL    nRBC 0 /100 WBCs    Neutrophils Relative 52 43 - 75 %    Immat GRANS % 1 0 - 2 %    Lymphocytes Relative 35 14 - 44 %    Monocytes Relative 9 4 - 12 %    Eosinophils Relative 2 0 - 6 %    Basophils Relative 1 0 - 1 %    Neutrophils Absolute 4 10 1 85 - 7 62 Thousands/µL    Immature Grans Absolute 0 04 0 00 - 0 20 Thousand/uL    Lymphocytes Absolute 2 65 0 60 - 4 47 Thousands/µL    Monocytes Absolute 0 68 0 17 - 1 22 Thousand/µL    Eosinophils Absolute 0 15 0 00 - 0 61 Thousand/µL    Basophils Absolute 0 06 0 00 - 0 10 Thousands/µL   CBC and differential    Collection Time: 09/15/18 10:11 AM   Result Value Ref Range    WBC 7 56 4 31 - 10 16 Thousand/uL    RBC 4 89 3 88 - 5 62 Million/uL    Hemoglobin 14 8 12 0 - 17 0 g/dL    Hematocrit 44 2 36 5 - 49 3 %    MCV 90 82 - 98 fL    MCH 30 3 26 8 - 34 3 pg    MCHC 33 5 31 4 - 37 4 g/dL    RDW 13 1 11 6 - 15 1 %    MPV 9 4 8 9 - 12 7 fL    Platelets 967 823 - 016 Thousands/uL    nRBC 0 /100 WBCs    Neutrophils Relative 69 43 - 75 %    Immat GRANS % 1 0 - 2 %    Lymphocytes Relative 20 14 - 44 %    Monocytes Relative 8 4 - 12 %    Eosinophils Relative 1 0 - 6 %    Basophils Relative 1 0 - 1 %    Neutrophils Absolute 5 23 1 85 - 7 62 Thousands/µL    Immature Grans Absolute 0 05 0 00 - 0 20 Thousand/uL    Lymphocytes Absolute 1 54 0 60 - 4 47 Thousands/µL    Monocytes Absolute 0 63 0 17 - 1 22 Thousand/µL    Eosinophils Absolute 0 05 0 00 - 0 61 Thousand/µL    Basophils Absolute 0 06 0 00 - 0 10 Thousands/µL   Comprehensive metabolic panel    Collection Time: 09/15/18 10:11 AM   Result Value Ref Range    Sodium 137 136 - 145 mmol/L    Potassium 4 0 3 5 - 5 3 mmol/L    Chloride 105 100 - 108 mmol/L    CO2 25 21 - 32 mmol/L    ANION GAP 7 4 - 13 mmol/L    BUN 8 5 - 25 mg/dL    Creatinine 0 96 0 60 - 1 30 mg/dL    Glucose, Fasting 87 65 - 99 mg/dL    Calcium 9 0 8 3 - 10 1 mg/dL    AST 41 5 - 45 U/L     (H) 12 - 78 U/L    Alkaline Phosphatase 73 46 - 116 U/L    Total Protein 8 1 6 4 - 8 2 g/dL    Albumin 4 0 3 5 - 5 0 g/dL    Total Bilirubin 0 33 0 20 - 1 00 mg/dL    eGFR 108 ml/min/1 73sq m       The following portions of the patient's history were reviewed and updated as appropriate: allergies, current medications, past family history, past medical history, past social history, past surgical history and problem list      Review of Systems   Constitutional: Negative for appetite change, chills, diaphoresis, fatigue, fever and unexpected weight change  HENT: Positive for postnasal drip and rhinorrhea  Negative for congestion, ear pain and hearing loss  Eyes: Negative for visual disturbance  Respiratory: Positive for cough  Negative for chest tightness, shortness of breath and wheezing  Cardiovascular: Negative for chest pain, palpitations and leg swelling  Gastrointestinal: Negative for abdominal pain and blood in stool  Endocrine: Negative for cold intolerance, heat intolerance, polydipsia and polyuria  Genitourinary: Negative for difficulty urinating, dysuria, frequency and urgency  Musculoskeletal: Negative for arthralgias and myalgias  Skin: Negative for rash  Neurological: Negative for dizziness, weakness, light-headedness and headaches     Hematological: Does not bruise/bleed easily  Psychiatric/Behavioral: Negative for dysphoric mood and sleep disturbance  Objective:      Vitals:    10/16/18 1648   BP: 120/84   Pulse: 70          Physical Exam   Constitutional: He is oriented to person, place, and time  He appears well-developed and well-nourished  HENT:   Head: Normocephalic and atraumatic  Nose: Nose normal    Mouth/Throat: Oropharynx is clear and moist    Left TM is mildly erythematous without effusion, right TM is bulging, markedly erythematous with cloudy effusion   Eyes: Pupils are equal, round, and reactive to light  Conjunctivae and EOM are normal  No scleral icterus  Neck: Normal range of motion  Neck supple  No JVD present  No tracheal deviation present  No thyromegaly present  Cardiovascular: Normal rate, regular rhythm and intact distal pulses  Exam reveals no gallop and no friction rub  No murmur heard  Pulmonary/Chest: Effort normal and breath sounds normal  No respiratory distress  He has no wheezes  He has no rales  Musculoskeletal: He exhibits no edema or deformity  Lymphadenopathy:     He has no cervical adenopathy  Neurological: He is alert and oriented to person, place, and time  No cranial nerve deficit  Skin: Skin is warm and dry  No rash noted  No erythema  No pallor  Psychiatric: He has a normal mood and affect   His behavior is normal  Judgment and thought content normal

## 2018-10-16 NOTE — PATIENT INSTRUCTIONS
Symptoms most consistent with viral upper respiratory infection with eustachian tube dysfunction  Physical exam consistent with acute otitis media of the right ear though he has no symptoms at this time  I recommended over-the-counter pseudoephedrine 120 mg twice daily, obtain this from the pharmacist without a prescription  Take pseudoephedrine twice daily for the next 4-6 days  If ear pain develops or there is fevers or chills or hearing loss despite the pseudoephedrine then start on the antibiotic, Augmentin 875 mg twice daily  In either case follow-up in 10 days to assure improvement

## 2018-11-01 ENCOUNTER — OFFICE VISIT (OUTPATIENT)
Dept: INTERNAL MEDICINE CLINIC | Facility: CLINIC | Age: 28
End: 2018-11-01
Payer: MEDICARE

## 2018-11-01 VITALS
SYSTOLIC BLOOD PRESSURE: 118 MMHG | HEART RATE: 80 BPM | DIASTOLIC BLOOD PRESSURE: 88 MMHG | HEIGHT: 68 IN | BODY MASS INDEX: 31.98 KG/M2 | TEMPERATURE: 99.3 F | WEIGHT: 211 LBS

## 2018-11-01 DIAGNOSIS — R05.9 COUGH: Primary | ICD-10-CM

## 2018-11-01 PROCEDURE — 99213 OFFICE O/P EST LOW 20 MIN: CPT | Performed by: NURSE PRACTITIONER

## 2018-11-01 RX ORDER — BENZONATATE 200 MG/1
200 CAPSULE ORAL 3 TIMES DAILY PRN
Qty: 20 CAPSULE | Refills: 0 | Status: SHIPPED | OUTPATIENT
Start: 2018-11-01 | End: 2019-01-10

## 2018-11-01 RX ORDER — FLUTICASONE PROPIONATE 50 MCG
2 SPRAY, SUSPENSION (ML) NASAL DAILY
Qty: 16 G | Refills: 0 | Status: SHIPPED | OUTPATIENT
Start: 2018-11-01 | End: 2019-01-10

## 2018-11-01 RX ORDER — AZITHROMYCIN 250 MG/1
TABLET, FILM COATED ORAL
Qty: 6 TABLET | Refills: 0 | Status: SHIPPED | OUTPATIENT
Start: 2018-11-01 | End: 2018-11-06

## 2018-11-01 NOTE — PROGRESS NOTES
Assessment/Plan:    Patient Instructions   Persistent cough question allergy vs bacterial  Recommend Claritin 10mg daily and flonase 2 sprays each nostril daily  Tesselon perles as needed  If symptoms are not improving over the next 3 days ok to start Azithromycin  Diagnoses and all orders for this visit:    Cough  -     fluticasone (FLONASE) 50 mcg/act nasal spray; 2 sprays into each nostril daily  -     benzonatate (TESSALON) 200 MG capsule; Take 1 capsule (200 mg total) by mouth 3 (three) times a day as needed for cough  -     azithromycin (ZITHROMAX) 250 mg tablet; Take 2 tabs today then 1 tab each additional day until complete         Subjective:      Patient ID: Nazario Chapa is a 33 y o  male    Pt has not been feeling well x 2 weeks, cough, runny nose, congestion, fatigue  Saw Dr Wen Carr told ear was red, but it didn't bother him, taking sudafed  No sob or wheeze, no f/c            Current Outpatient Prescriptions:     OLANZapine (ZyPREXA) 15 mg tablet, Take 10 mg by mouth daily at bedtime  , Disp: , Rfl:     ranitidine (ZANTAC) 150 mg tablet, Take 1 tablet (150 mg total) by mouth 2 (two) times a day, Disp: 60 tablet, Rfl: 3    azithromycin (ZITHROMAX) 250 mg tablet, Take 2 tabs today then 1 tab each additional day until complete, Disp: 6 tablet, Rfl: 0    benzonatate (TESSALON) 200 MG capsule, Take 1 capsule (200 mg total) by mouth 3 (three) times a day as needed for cough, Disp: 20 capsule, Rfl: 0    fluticasone (FLONASE) 50 mcg/act nasal spray, 2 sprays into each nostril daily, Disp: 16 g, Rfl: 0  No current facility-administered medications for this visit  No results found for this or any previous visit (from the past 1008 hour(s))      The following portions of the patient's history were reviewed and updated as appropriate: allergies, current medications, past family history, past medical history, past social history, past surgical history and problem list      Review of Systems Constitutional: Positive for fever  Negative for appetite change, chills, diaphoresis and fatigue  HENT: Positive for postnasal drip, sinus pressure and sore throat  Respiratory: Positive for cough  Negative for shortness of breath  Cardiovascular: Negative  Negative for chest pain and leg swelling  Endocrine: Negative  Genitourinary: Negative  Musculoskeletal: Negative  Neurological: Negative  Hematological: Negative  Psychiatric/Behavioral: Negative  Objective:      /88 (BP Location: Left arm, Patient Position: Sitting, Cuff Size: Standard)   Pulse 80   Temp 99 3 °F (37 4 °C) (Tympanic)   Ht 5' 8" (1 727 m)   Wt 95 7 kg (211 lb)   BMI 32 08 kg/m²        Physical Exam   Constitutional: He is oriented to person, place, and time  He appears well-developed  No distress  HENT:   Head: Normocephalic and atraumatic  Nose: Nose normal    Mouth/Throat: Oropharynx is clear and moist    tms bulging without erythema   Eyes: Pupils are equal, round, and reactive to light  Conjunctivae and EOM are normal    Neck: Normal range of motion  Neck supple  No JVD present  No tracheal deviation present  No thyromegaly present  Cardiovascular: Normal rate, regular rhythm, normal heart sounds and intact distal pulses  Exam reveals no gallop and no friction rub  No murmur heard  Pulmonary/Chest: Effort normal and breath sounds normal  No respiratory distress  He has no wheezes  He has no rales  Abdominal: Soft  Bowel sounds are normal  He exhibits no distension  There is no tenderness  Musculoskeletal: Normal range of motion  He exhibits no edema  Lymphadenopathy:     He has no cervical adenopathy  Neurological: He is alert and oriented to person, place, and time  No cranial nerve deficit  Skin: Skin is warm and dry  No rash noted  He is not diaphoretic  Psychiatric: He has a normal mood and affect   His behavior is normal  Judgment and thought content normal

## 2018-11-01 NOTE — PATIENT INSTRUCTIONS
Persistent cough question allergy vs bacterial  Recommend Claritin 10mg daily and flonase 2 sprays each nostril daily  Tesselon perles as needed  If symptoms are not improving over the next 3 days ok to start Azithromycin

## 2019-01-10 ENCOUNTER — OFFICE VISIT (OUTPATIENT)
Dept: INTERNAL MEDICINE CLINIC | Facility: CLINIC | Age: 29
End: 2019-01-10
Payer: MEDICARE

## 2019-01-10 VITALS
SYSTOLIC BLOOD PRESSURE: 140 MMHG | HEART RATE: 88 BPM | BODY MASS INDEX: 33.19 KG/M2 | HEIGHT: 68 IN | DIASTOLIC BLOOD PRESSURE: 80 MMHG | WEIGHT: 219 LBS | RESPIRATION RATE: 14 BRPM

## 2019-01-10 DIAGNOSIS — D49.2 SKIN NEOPLASM: ICD-10-CM

## 2019-01-10 DIAGNOSIS — K62.5 RECTAL BLEEDING: ICD-10-CM

## 2019-01-10 DIAGNOSIS — K64.0 GRADE I HEMORRHOIDS: Primary | ICD-10-CM

## 2019-01-10 DIAGNOSIS — R21 RASH: ICD-10-CM

## 2019-01-10 PROCEDURE — 99213 OFFICE O/P EST LOW 20 MIN: CPT | Performed by: INTERNAL MEDICINE

## 2019-01-10 RX ORDER — HYDROCORTISONE ACETATE 25 MG/1
25 SUPPOSITORY RECTAL 2 TIMES DAILY
Qty: 12 SUPPOSITORY | Refills: 3 | Status: SHIPPED | OUTPATIENT
Start: 2019-01-10

## 2019-01-10 RX ORDER — DIPHENOXYLATE HYDROCHLORIDE AND ATROPINE SULFATE 2.5; .025 MG/1; MG/1
1 TABLET ORAL DAILY
COMMUNITY

## 2019-01-10 RX ORDER — CLOTRIMAZOLE 1 %
CREAM (GRAM) TOPICAL
Refills: 4 | COMMUNITY
Start: 2018-12-27

## 2019-01-10 NOTE — PROGRESS NOTES
Assessment/Plan:    Patient Instructions   Recommend starting back on hydrocortisone suppository twice daily as needed, start on over-the-counter fiber supplements daily such as Metamucil, FiberCon, Citrucel or benefit fiber  Start stool softener such as Colace 100 mg twice daily  Referral to Colorectal surgery  Referral to Dermatology for atypical appearing nevi         Diagnoses and all orders for this visit:    Grade I hemorrhoids  -     Ambulatory referral to Colorectal Surgery; Future  -     hydrocortisone (ANUSOL-HC) 25 mg suppository; Insert 1 suppository (25 mg total) into the rectum 2 (two) times a day    Rectal bleeding  -     Ambulatory referral to Colorectal Surgery; Future    Skin neoplasm  -     Ambulatory referral to Dermatology; Future    Rash  -     Ambulatory referral to Dermatology; Future    Other orders  -     clotrimazole (LOTRIMIN) 1 % cream; APPLY TO AFFECTED AREA EVERY DAY  -     multivitamin (THERAGRAN) TABS; Take 1 tablet by mouth daily         Subjective:      Patient ID: Talat Maradiaga is a 33 y o  male    Bijal Mcintyre notes persistent discomfort in the perirectal area  He notes protrusion of hemorrhoid which is tender  The suppositories did alleviate some of the pain and itching however over-the-counter topical agents are ineffective  He has a bowel movement usually once or twice daily  He does occasionally need to strain to have a bowel movement  There is occasional bleeding and rectal pain with passage of the stool but he does not feel it is terribly hard  Stool has been formed      He notes the rash on his belly and chest has not completely resolved          Current Outpatient Prescriptions:     clotrimazole (LOTRIMIN) 1 % cream, APPLY TO AFFECTED AREA EVERY DAY, Disp: , Rfl: 4    multivitamin (THERAGRAN) TABS, Take 1 tablet by mouth daily, Disp: , Rfl:     OLANZapine (ZyPREXA) 15 mg tablet, Take 10 mg by mouth daily at bedtime  , Disp: , Rfl:     ranitidine (ZANTAC) 150 mg tablet, Take 1 tablet (150 mg total) by mouth 2 (two) times a day, Disp: 60 tablet, Rfl: 3    hydrocortisone (ANUSOL-HC) 25 mg suppository, Insert 1 suppository (25 mg total) into the rectum 2 (two) times a day, Disp: 12 suppository, Rfl: 3    No results found for this or any previous visit (from the past 1008 hour(s))  The following portions of the patient's history were reviewed and updated as appropriate: allergies, current medications, past family history, past medical history, past social history, past surgical history and problem list      Review of Systems   Constitutional: Negative for appetite change, chills, diaphoresis, fatigue, fever and unexpected weight change  HENT: Negative for congestion, hearing loss and rhinorrhea  Eyes: Negative for visual disturbance  Respiratory: Negative for cough, chest tightness, shortness of breath and wheezing  Cardiovascular: Negative for chest pain, palpitations and leg swelling  Gastrointestinal: Positive for anal bleeding  Negative for abdominal pain and blood in stool  Endocrine: Negative for cold intolerance, heat intolerance, polydipsia and polyuria  Genitourinary: Negative for difficulty urinating, dysuria, frequency and urgency  Musculoskeletal: Negative for arthralgias and myalgias  Skin: Negative for rash  Neurological: Negative for dizziness, weakness, light-headedness and headaches  Hematological: Does not bruise/bleed easily  Psychiatric/Behavioral: Negative for dysphoric mood and sleep disturbance  Objective:      Vitals:    01/10/19 1337   BP: 140/80   Pulse: 88   Resp: 14          Physical Exam   Constitutional: He is oriented to person, place, and time  He appears well-developed and well-nourished  HENT:   Head: Normocephalic and atraumatic  Nose: Nose normal    Mouth/Throat: Oropharynx is clear and moist    Eyes: Pupils are equal, round, and reactive to light  Conjunctivae and EOM are normal  No scleral icterus  Neck: Normal range of motion  Neck supple  No JVD present  No tracheal deviation present  No thyromegaly present  Cardiovascular: Normal rate, regular rhythm and intact distal pulses  Exam reveals no gallop and no friction rub  No murmur heard  Pulmonary/Chest: Effort normal and breath sounds normal  No respiratory distress  He has no wheezes  He has no rales  Abdominal: Soft  Bowel sounds are normal  He exhibits no distension and no mass  There is no tenderness  There is no rebound and no guarding  Moderate-sized predominantly left-sided non thrombosed external hemorrhoid   Musculoskeletal: He exhibits no edema, tenderness or deformity  Lymphadenopathy:     He has no cervical adenopathy  Neurological: He is alert and oriented to person, place, and time  No cranial nerve deficit  Skin: Skin is warm and dry  No rash noted  No erythema  No pallor  I see no significant rash on the chest or upper abdomen there is approximately 8 x 10 mm hyperpigmented macular lesion with later halo on the right mid abdomen   Psychiatric: He has a normal mood and affect   His behavior is normal  Judgment and thought content normal

## 2019-01-10 NOTE — PATIENT INSTRUCTIONS
Recommend starting back on hydrocortisone suppository twice daily as needed, start on over-the-counter fiber supplements daily such as Metamucil, FiberCon, Citrucel or benefit fiber  Start stool softener such as Colace 100 mg twice daily  Referral to Colorectal surgery      Referral to Dermatology for atypical appearing nevi

## 2019-01-17 ENCOUNTER — OFFICE VISIT (OUTPATIENT)
Dept: INTERNAL MEDICINE CLINIC | Facility: CLINIC | Age: 29
End: 2019-01-17
Payer: MEDICARE

## 2019-01-17 VITALS
OXYGEN SATURATION: 97 % | TEMPERATURE: 98.4 F | WEIGHT: 218.4 LBS | SYSTOLIC BLOOD PRESSURE: 122 MMHG | DIASTOLIC BLOOD PRESSURE: 80 MMHG | BODY MASS INDEX: 33.21 KG/M2 | HEART RATE: 103 BPM

## 2019-01-17 DIAGNOSIS — Z02.9 ENCOUNTER FOR ADMINISTRATIVE EXAMINATIONS: Primary | ICD-10-CM

## 2019-01-17 DIAGNOSIS — F20.9 SCHIZOPHRENIA, UNSPECIFIED TYPE (HCC): ICD-10-CM

## 2019-01-17 PROCEDURE — 99213 OFFICE O/P EST LOW 20 MIN: CPT | Performed by: PHYSICIAN ASSISTANT

## 2019-01-17 NOTE — PROGRESS NOTES
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  Schizophrenia well controlled with meds he is followed by Psychiatry  Diagnoses and all orders for this visit:    Encounter for administrative examinations    Schizophrenia, unspecified type ([facility] )        No problem-specific Assessment & Plan notes found for this encounter  Subjective:      Patient ID: Anamika Ponce is a 33 y o  male  He is here for 's license forms to be filled out  He has a history of schizophrenia  This is well controlled with meds he is followed by Psychiatry  No seizures  No hospitalizations for years  He feels well has no complaints at all  His vision is good he wears glasses        The following portions of the patient's history were reviewed and updated as appropriate:   He has a past medical history of GERD (gastroesophageal reflux disease); H  pylori infection; Hematochezia; Hypercholesteremia; Hyperlipidemia; Psychosis ([facility] ); and Schizophrenia ([facility] )  ,   does not have any pertinent problems on file  ,   has a past surgical history that includes Colonoscopy and pr colonoscopy flx dx w/collj spec when pfrmd (N/A, 9/27/2017)  ,  family history includes Cancer in his family; Hypertension in his family  ,   reports that he has quit smoking  His smoking use included Cigarettes  He has never used smokeless tobacco  He reports that he drinks alcohol  He reports that he does not use drugs  ,  has No Known Allergies     Current Outpatient Prescriptions   Medication Sig Dispense Refill    clotrimazole (LOTRIMIN) 1 % cream APPLY TO AFFECTED AREA EVERY DAY  4    hydrocortisone (ANUSOL-HC) 25 mg suppository Insert 1 suppository (25 mg total) into the rectum 2 (two) times a day 12 suppository 3    multivitamin (THERAGRAN) TABS Take 1 tablet by mouth daily      OLANZapine (ZyPREXA) 15 mg tablet Take 10 mg by mouth daily at bedtime        ranitidine (ZANTAC) 150 mg tablet Take 1 tablet (150 mg total) by mouth 2 (two) times a day 60 tablet 3     No current facility-administered medications for this visit  Review of Systems   Constitutional: Negative for activity change, appetite change, chills, diaphoresis, fatigue, fever and unexpected weight change  HENT: Negative for congestion, dental problem, drooling, ear discharge, ear pain, facial swelling, hearing loss, nosebleeds, postnasal drip, rhinorrhea, sinus pain, sinus pressure, sneezing, sore throat, tinnitus, trouble swallowing and voice change  Eyes: Negative for photophobia, pain, discharge, redness, itching and visual disturbance  Respiratory: Negative for apnea, cough, choking, chest tightness, shortness of breath, wheezing and stridor  Cardiovascular: Negative for chest pain, palpitations and leg swelling  Gastrointestinal: Negative for abdominal distention, abdominal pain, anal bleeding, blood in stool, constipation, diarrhea, nausea, rectal pain and vomiting  Endocrine: Negative for cold intolerance, heat intolerance, polydipsia, polyphagia and polyuria  Genitourinary: Negative for decreased urine volume, difficulty urinating, dysuria, enuresis, flank pain, frequency, genital sores, hematuria and urgency  Musculoskeletal: Negative for arthralgias, back pain, gait problem, joint swelling, myalgias, neck pain and neck stiffness  Skin: Negative for color change, pallor, rash and wound  Neurological: Negative for dizziness, tremors, seizures, syncope, facial asymmetry, speech difficulty, weakness, light-headedness, numbness and headaches  Hematological: Negative for adenopathy  Does not bruise/bleed easily  Psychiatric/Behavioral: Negative for agitation, behavioral problems, confusion, decreased concentration, dysphoric mood, hallucinations, self-injury, sleep disturbance and suicidal ideas  The patient is not nervous/anxious and is not hyperactive            Objective:  Vitals:    01/17/19 1341   BP: 122/80   BP Location: Left arm   Patient Position: Sitting   Cuff Size: Standard   Pulse: 103   Temp: 98 4 °F (36 9 °C)   SpO2: 97%   Weight: 99 1 kg (218 lb 6 4 oz)     Body mass index is 33 21 kg/m²  Physical Exam   Constitutional: He is oriented to person, place, and time  He appears well-developed  Obese   HENT:   Head: Normocephalic  Right Ear: External ear normal    Left Ear: External ear normal    Mouth/Throat: Oropharynx is clear and moist    Eyes: Pupils are equal, round, and reactive to light  Conjunctivae are normal    Neck: Neck supple  No thyromegaly present  Cardiovascular: Normal rate, regular rhythm, normal heart sounds and intact distal pulses  Pulmonary/Chest: Effort normal and breath sounds normal    Abdominal: Soft  Bowel sounds are normal    Musculoskeletal: Normal range of motion  Neurological: He is alert and oriented to person, place, and time  He has normal reflexes  Skin: Skin is warm and dry     Psychiatric:   Flat affect

## 2019-02-22 ENCOUNTER — TRANSCRIBE ORDERS (OUTPATIENT)
Dept: LAB | Facility: CLINIC | Age: 29
End: 2019-02-22

## 2019-02-22 ENCOUNTER — APPOINTMENT (OUTPATIENT)
Dept: LAB | Facility: CLINIC | Age: 29
End: 2019-02-22
Payer: MEDICARE

## 2019-02-22 DIAGNOSIS — Z79.899 NEED FOR PROPHYLACTIC CHEMOTHERAPY: Primary | ICD-10-CM

## 2019-02-22 DIAGNOSIS — R74.01 ELEVATED TRANSAMINASE LEVEL: ICD-10-CM

## 2019-02-22 DIAGNOSIS — Z79.899 NEED FOR PROPHYLACTIC CHEMOTHERAPY: ICD-10-CM

## 2019-02-22 DIAGNOSIS — E78.2 MIXED HYPERLIPIDEMIA: ICD-10-CM

## 2019-02-22 DIAGNOSIS — R73.01 IMPAIRED FASTING GLUCOSE: ICD-10-CM

## 2019-02-22 LAB
ALBUMIN SERPL BCP-MCNC: 4.2 G/DL (ref 3.5–5)
ALP SERPL-CCNC: 68 U/L (ref 46–116)
ALT SERPL W P-5'-P-CCNC: 143 U/L (ref 12–78)
ANION GAP SERPL CALCULATED.3IONS-SCNC: 7 MMOL/L (ref 4–13)
AST SERPL W P-5'-P-CCNC: 49 U/L (ref 5–45)
BASOPHILS # BLD AUTO: 0.05 THOUSANDS/ΜL (ref 0–0.1)
BASOPHILS NFR BLD AUTO: 1 % (ref 0–1)
BILIRUB SERPL-MCNC: 0.54 MG/DL (ref 0.2–1)
BUN SERPL-MCNC: 6 MG/DL (ref 5–25)
CALCIUM SERPL-MCNC: 9.3 MG/DL (ref 8.3–10.1)
CHLORIDE SERPL-SCNC: 105 MMOL/L (ref 100–108)
CHOLEST SERPL-MCNC: 241 MG/DL (ref 50–200)
CO2 SERPL-SCNC: 26 MMOL/L (ref 21–32)
CREAT SERPL-MCNC: 0.89 MG/DL (ref 0.6–1.3)
EOSINOPHIL # BLD AUTO: 0.07 THOUSAND/ΜL (ref 0–0.61)
EOSINOPHIL NFR BLD AUTO: 1 % (ref 0–6)
ERYTHROCYTE [DISTWIDTH] IN BLOOD BY AUTOMATED COUNT: 12.8 % (ref 11.6–15.1)
EST. AVERAGE GLUCOSE BLD GHB EST-MCNC: 117 MG/DL
GFR SERPL CREATININE-BSD FRML MDRD: 117 ML/MIN/1.73SQ M
GLUCOSE P FAST SERPL-MCNC: 99 MG/DL (ref 65–99)
HBA1C MFR BLD: 5.7 % (ref 4.2–6.3)
HCT VFR BLD AUTO: 47.2 % (ref 36.5–49.3)
HDLC SERPL-MCNC: 44 MG/DL (ref 40–60)
HGB BLD-MCNC: 15.7 G/DL (ref 12–17)
IMM GRANULOCYTES # BLD AUTO: 0.04 THOUSAND/UL (ref 0–0.2)
IMM GRANULOCYTES NFR BLD AUTO: 1 % (ref 0–2)
LDLC SERPL CALC-MCNC: 153 MG/DL (ref 0–100)
LYMPHOCYTES # BLD AUTO: 1.75 THOUSANDS/ΜL (ref 0.6–4.47)
LYMPHOCYTES NFR BLD AUTO: 26 % (ref 14–44)
MCH RBC QN AUTO: 29.8 PG (ref 26.8–34.3)
MCHC RBC AUTO-ENTMCNC: 33.3 G/DL (ref 31.4–37.4)
MCV RBC AUTO: 90 FL (ref 82–98)
MONOCYTES # BLD AUTO: 0.55 THOUSAND/ΜL (ref 0.17–1.22)
MONOCYTES NFR BLD AUTO: 8 % (ref 4–12)
NEUTROPHILS # BLD AUTO: 4.26 THOUSANDS/ΜL (ref 1.85–7.62)
NEUTS SEG NFR BLD AUTO: 63 % (ref 43–75)
NONHDLC SERPL-MCNC: 197 MG/DL
NRBC BLD AUTO-RTO: 0 /100 WBCS
PLATELET # BLD AUTO: 229 THOUSANDS/UL (ref 149–390)
PMV BLD AUTO: 9 FL (ref 8.9–12.7)
POTASSIUM SERPL-SCNC: 4.5 MMOL/L (ref 3.5–5.3)
PROT SERPL-MCNC: 8.1 G/DL (ref 6.4–8.2)
RBC # BLD AUTO: 5.27 MILLION/UL (ref 3.88–5.62)
SODIUM SERPL-SCNC: 138 MMOL/L (ref 136–145)
TRIGL SERPL-MCNC: 218 MG/DL
TSH SERPL DL<=0.05 MIU/L-ACNC: 1.08 UIU/ML (ref 0.36–3.74)
WBC # BLD AUTO: 6.72 THOUSAND/UL (ref 4.31–10.16)

## 2019-02-22 PROCEDURE — 85025 COMPLETE CBC W/AUTO DIFF WBC: CPT

## 2019-02-22 PROCEDURE — 83036 HEMOGLOBIN GLYCOSYLATED A1C: CPT

## 2019-02-22 PROCEDURE — 86038 ANTINUCLEAR ANTIBODIES: CPT

## 2019-02-22 PROCEDURE — 80053 COMPREHEN METABOLIC PANEL: CPT

## 2019-02-22 PROCEDURE — 36415 COLL VENOUS BLD VENIPUNCTURE: CPT

## 2019-02-22 PROCEDURE — 80061 LIPID PANEL: CPT

## 2019-02-22 PROCEDURE — 84443 ASSAY THYROID STIM HORMONE: CPT

## 2019-02-25 LAB — RYE IGE QN: NEGATIVE

## 2019-03-06 ENCOUNTER — TELEPHONE (OUTPATIENT)
Dept: INTERNAL MEDICINE CLINIC | Facility: CLINIC | Age: 29
End: 2019-03-06

## 2019-03-06 DIAGNOSIS — L21.9 SEBORRHEIC DERMATITIS: Primary | ICD-10-CM

## 2019-03-06 RX ORDER — DESONIDE 0.5 MG/G
CREAM TOPICAL
Qty: 15 G | Refills: 3 | Status: SHIPPED | OUTPATIENT
Start: 2019-03-06

## 2019-03-06 NOTE — TELEPHONE ENCOUNTER
Patient called for a new script for Desonide 0 5% cream  Stated he was supposed to pick it up about a month ago but it was too expensive at the time  He is able to afford it now   Please send to [facility] on [address]

## 2019-04-05 ENCOUNTER — TELEPHONE (OUTPATIENT)
Dept: INTERNAL MEDICINE CLINIC | Facility: CLINIC | Age: 29
End: 2019-04-05

## 2019-04-08 DIAGNOSIS — L21.9 SEBORRHEIC DERMATITIS: Primary | ICD-10-CM

## 2019-05-21 ENCOUNTER — OFFICE VISIT (OUTPATIENT)
Dept: GASTROENTEROLOGY | Facility: CLINIC | Age: 29
End: 2019-05-21
Payer: MEDICARE

## 2019-05-21 VITALS
BODY MASS INDEX: 31.98 KG/M2 | HEART RATE: 99 BPM | HEIGHT: 68 IN | DIASTOLIC BLOOD PRESSURE: 80 MMHG | RESPIRATION RATE: 16 BRPM | WEIGHT: 211 LBS | SYSTOLIC BLOOD PRESSURE: 124 MMHG

## 2019-05-21 DIAGNOSIS — R79.89 ABNORMAL LIVER FUNCTION TEST: ICD-10-CM

## 2019-05-21 DIAGNOSIS — R14.0 BLOATING: Primary | ICD-10-CM

## 2019-05-21 PROCEDURE — 99214 OFFICE O/P EST MOD 30 MIN: CPT | Performed by: INTERNAL MEDICINE

## 2019-09-09 ENCOUNTER — APPOINTMENT (OUTPATIENT)
Dept: LAB | Facility: CLINIC | Age: 29
End: 2019-09-09
Payer: MEDICARE

## 2019-12-21 NOTE — PLAN OF CARE
DISCHARGE PLANNING     Discharge to home or other facility with appropriate resources Progressing        INFECTION - ADULT     Absence or prevention of progression during hospitalization Progressing     Absence of fever/infection during neutropenic period Progressing        Knowledge Deficit     Patient/family/caregiver demonstrates understanding of disease process, treatment plan, medications, and discharge instructions Progressing        PAIN - ADULT     Verbalizes/displays adequate comfort level or baseline comfort level Progressing        Potential for Falls     Patient will remain free of falls Progressing        SAFETY ADULT     Patient will remain free of falls Progressing     Maintain or return to baseline ADL function Progressing     Maintain or return mobility status to optimal level Progressing
DISCHARGE PLANNING     Discharge to home or other facility with appropriate resources Progressing        INFECTION - ADULT     Absence or prevention of progression during hospitalization Progressing     Absence of fever/infection during neutropenic period Progressing        Knowledge Deficit     Patient/family/caregiver demonstrates understanding of disease process, treatment plan, medications, and discharge instructions Progressing        PAIN - ADULT     Verbalizes/displays adequate comfort level or baseline comfort level Progressing        SAFETY ADULT     Patient will remain free of falls Progressing     Maintain or return to baseline ADL function Progressing     Maintain or return mobility status to optimal level Progressing
Family

## 2020-08-28 NOTE — SOCIAL WORK
CM met with pt at bedside  Pt lives alone in an apt with 20 steps w/railing to reach  He is able to navigate steps, but must do it slowly due to the cellulitis in his R foot  He is independent with ADL's and uses no DME's  He has never been in rehab or used [facility] services  He denies substance abuse  He had an episode of depression when he was in high school and was on anti-depressants, but has not been on medication in 8 years  Dr Layla Hicks is his PCP  He uses [facility] and has no problem with his co-pays  He does not have a POA or Advanced Directive  CM supplied info on both  He works part time-will be starting at the [employer] in [facility]  He does not have a license-his parents transports to appointments or he takes the bus  His mother will transport home when he is medically cleared  CM discussed d/c needs including [facility] services, but pt does not feel he will have any problems taking care of his R foot  CM will continue to follow through hospitalization  CM reviewed discharge planning process including the following: identifying help at home, patient preference for discharge planning needs, pharmacy preference, and availability of treatment team to discuss questions or concerns patient and/or family may have regarding understanding medications and recognizing signs and symptoms once discharged  CM also encouraged patient to follow up with all recommended appointments after discharge  Patient advised of importance for patient and family to participate in managing patients medical well being  CM name and role reviewed  Discharge Checklist reviewed and CM will continue to monitor for progress toward discharge goals in nursing and provider rounds 
Pt to be discharged w/no needs  Is refusing [facility] up for 13:00 to transport home to [facility]  Nurse/pt notified of time 
normal...

## 2020-09-30 ENCOUNTER — OFFICE VISIT (OUTPATIENT)
Dept: INTERNAL MEDICINE CLINIC | Facility: CLINIC | Age: 30
End: 2020-09-30
Payer: MEDICARE

## 2020-09-30 VITALS
TEMPERATURE: 97.9 F | DIASTOLIC BLOOD PRESSURE: 80 MMHG | HEIGHT: 68 IN | HEART RATE: 98 BPM | BODY MASS INDEX: 31.61 KG/M2 | WEIGHT: 208.6 LBS | SYSTOLIC BLOOD PRESSURE: 116 MMHG

## 2020-09-30 DIAGNOSIS — E66.01 MORBID OBESITY DUE TO EXCESS CALORIES (HCC): ICD-10-CM

## 2020-09-30 DIAGNOSIS — Z11.59 NEED FOR HEPATITIS C SCREENING TEST: ICD-10-CM

## 2020-09-30 DIAGNOSIS — R73.01 IMPAIRED FASTING GLUCOSE: ICD-10-CM

## 2020-09-30 DIAGNOSIS — F20.9 SCHIZOPHRENIA, UNSPECIFIED TYPE (HCC): ICD-10-CM

## 2020-09-30 DIAGNOSIS — E78.5 HYPERLIPIDEMIA, UNSPECIFIED HYPERLIPIDEMIA TYPE: Primary | ICD-10-CM

## 2020-09-30 DIAGNOSIS — Z11.4 SCREENING FOR HIV (HUMAN IMMUNODEFICIENCY VIRUS): ICD-10-CM

## 2020-09-30 PROCEDURE — 99214 OFFICE O/P EST MOD 30 MIN: CPT | Performed by: INTERNAL MEDICINE

## 2020-09-30 RX ORDER — ATORVASTATIN CALCIUM 20 MG/1
20 TABLET, FILM COATED ORAL DAILY
Qty: 30 TABLET | Refills: 1 | Status: SHIPPED | OUTPATIENT
Start: 2020-09-30 | End: 2021-03-01 | Stop reason: SDUPTHER

## 2020-09-30 RX ORDER — ATORVASTATIN CALCIUM 20 MG/1
20 TABLET, FILM COATED ORAL DAILY
COMMUNITY
Start: 2020-09-10 | End: 2020-09-30 | Stop reason: SDUPTHER

## 2020-09-30 NOTE — PATIENT INSTRUCTIONS
Hospital labs reviewed    Hyperlipidemia-continue on atorvastatin, recheck labs in 6 months prior to follow-up    Impaired fasting glucose-check A1c prior to follow-up, increased aerobic exercise and weight loss encouraged    Schizophrenia-importance of medication compliance indefinitely has been discussed    Health maintenance-patient declines flu shot, agrees to HIV and hep C screening    Routine follow-up after labs in 6 months, sooner as needed

## 2020-09-30 NOTE — PROGRESS NOTES
Assessment/Plan:    Diagnoses and all orders for this visit:    Hyperlipidemia, unspecified hyperlipidemia type  -     atorvastatin (LIPITOR) 20 mg tablet; Take 1 tablet (20 mg total) by mouth daily  -     CBC and differential; Future  -     Comprehensive metabolic panel; Future  -     Lipid panel; Future  -     TSH, 3rd generation with Free T4 reflex; Future    Impaired fasting glucose  -     Hemoglobin A1C; Future    Morbid obesity due to excess calories (HCC)  -     TSH, 3rd generation with Free T4 reflex; Future    Schizophrenia, unspecified type ([facility] )    Screening for HIV (human immunodeficiency virus)  -     HIV 1/2 Antigen/Antibody (4th Generation) w Reflex [facility]; Future    Need for hepatitis C screening test  -     Hepatitis C antibody; Future    Other orders  -     Discontinue: atorvastatin (LIPITOR) 20 mg tablet; Take 20 mg by mouth daily         BMI Counseling: Body mass index is 31 72 kg/m²  The BMI is above normal  Nutrition recommendations include decreasing portion sizes, decreasing fast food intake, consuming healthier snacks, limiting drinks that contain sugar, moderation in carbohydrate intake and reducing intake of saturated and trans fat  Exercise recommendations include moderate physical activity 150 minutes/week  No pharmacotherapy was ordered  Patient [address] labs reviewed    Hyperlipidemia-continue on atorvastatin, recheck labs in 6 months prior to follow-up    Impaired fasting glucose-check A1c prior to follow-up, increased aerobic exercise and weight loss encouraged    Schizophrenia-importance of medication compliance indefinitely has been discussed    Health maintenance-patient declines flu shot, agrees to HIV and hep C screening    Routine follow-up after labs in 6 months, sooner as needed  Subjective:      Patient ID: Karla Moran is a 35 y o  male    This is a hospital follow-up-patient was admitted September 1st on a 302 to a [facility]    He had decided to stop taking his medication and was directed there by his counselor  He was hospitalized for approximately 10 days  During workup lab workup revealed LDL cholesterol 182 for which patient was put on atorvastatin and he needs refill and follow-up for that  He feels as though he is stable back on his meds at this time  He is tolerating the atorvastatin well  Not currently working    Walking on occasion    Smoking cigarettes on occasion, 2-3 per day  No illicit drugs, rare etoh            Current Outpatient Medications:     atorvastatin (LIPITOR) 20 mg tablet, Take 1 tablet (20 mg total) by mouth daily, Disp: 30 tablet, Rfl: 1    multivitamin (THERAGRAN) TABS, Take 1 tablet by mouth daily, Disp: , Rfl:     OLANZapine (ZyPREXA) 15 mg tablet, Take 10 mg by mouth daily at bedtime  , Disp: , Rfl:     clotrimazole (LOTRIMIN) 1 % cream, APPLY TO AFFECTED AREA EVERY DAY, Disp: , Rfl: 4    desonide (DESOWEN) 0 05 % cream, Apply to affected areas on face 2 times a day as needed (Patient not taking: Reported on 5/21/2019), Disp: 15 g, Rfl: 3    hydrocortisone (ANUSOL-HC) 25 mg suppository, Insert 1 suppository (25 mg total) into the rectum 2 (two) times a day (Patient not taking: Reported on 5/21/2019), Disp: 12 suppository, Rfl: 3    hydrocortisone 2 5 % cream, Apply to affected areas on face twice daily as needed (Patient not taking: Reported on 5/21/2019), Disp: 30 g, Rfl: 0    ranitidine (ZANTAC) 150 mg tablet, Take 1 tablet (150 mg total) by mouth 2 (two) times a day (Patient not taking: Reported on 5/21/2019), Disp: 60 tablet, Rfl: 3    Recent Results (from the past 1008 hour(s))   HEMOGLOBIN A1C    Collection Time: 09/02/20  7:18 AM   Result Value Ref Range    Hemoglobin A1C 5 0 <5 7 %    eAG, EST AVG Glucose 97 <154 mg/dL       The following portions of the patient's history were reviewed and updated as appropriate: allergies, current medications, past family history, past medical history, past social history, past surgical history and problem list      Review of Systems   Constitutional: Negative for appetite change, chills, diaphoresis, fatigue, fever and unexpected weight change  HENT: Negative for congestion, hearing loss and rhinorrhea  Eyes: Negative for visual disturbance  Respiratory: Negative for cough, chest tightness, shortness of breath and wheezing  Cardiovascular: Negative for chest pain, palpitations and leg swelling  Gastrointestinal: Negative for abdominal pain and blood in stool  Endocrine: Negative for cold intolerance, heat intolerance, polydipsia and polyuria  Genitourinary: Negative for difficulty urinating, dysuria, frequency and urgency  Musculoskeletal: Negative for arthralgias and myalgias  Skin: Negative for rash  Neurological: Negative for dizziness, weakness, light-headedness and headaches  Hematological: Does not bruise/bleed easily  Psychiatric/Behavioral: Negative for dysphoric mood and sleep disturbance  Objective:      Vitals:    09/30/20 1612   BP: 116/80   Pulse: 98   Temp: 97 9 °F (36 6 °C)          Physical Exam  Constitutional:       Appearance: He is well-developed  HENT:      Head: Normocephalic and atraumatic  Nose: Nose normal    Eyes:      General: No scleral icterus  Conjunctiva/sclera: Conjunctivae normal       Pupils: Pupils are equal, round, and reactive to light  Neck:      Musculoskeletal: Normal range of motion and neck supple  Thyroid: No thyromegaly  Vascular: No JVD  Trachea: No tracheal deviation  Cardiovascular:      Rate and Rhythm: Normal rate and regular rhythm  Heart sounds: No murmur  No friction rub  No gallop  Pulmonary:      Effort: Pulmonary effort is normal  No respiratory distress  Breath sounds: Normal breath sounds  No wheezing or rales  Musculoskeletal:         General: No deformity  Lymphadenopathy:      Cervical: No cervical adenopathy     Skin: General: Skin is warm and dry  Coloration: Skin is not pale  Findings: No erythema or rash  Neurological:      Mental Status: He is alert and oriented to person, place, and time  Cranial Nerves: No cranial nerve deficit  Psychiatric:         Behavior: Behavior normal          Thought Content:  Thought content normal          Judgment: Judgment normal

## 2021-02-27 ENCOUNTER — LAB (OUTPATIENT)
Dept: LAB | Facility: HOSPITAL | Age: 31
End: 2021-02-27
Attending: INTERNAL MEDICINE
Payer: MEDICARE

## 2021-02-27 DIAGNOSIS — Z11.4 SCREENING FOR HIV (HUMAN IMMUNODEFICIENCY VIRUS): ICD-10-CM

## 2021-02-27 DIAGNOSIS — E66.01 MORBID OBESITY DUE TO EXCESS CALORIES (HCC): ICD-10-CM

## 2021-02-27 DIAGNOSIS — E78.5 HYPERLIPIDEMIA, UNSPECIFIED HYPERLIPIDEMIA TYPE: ICD-10-CM

## 2021-02-27 DIAGNOSIS — R73.01 IMPAIRED FASTING GLUCOSE: ICD-10-CM

## 2021-02-27 DIAGNOSIS — Z11.59 NEED FOR HEPATITIS C SCREENING TEST: ICD-10-CM

## 2021-02-27 LAB
ALBUMIN SERPL BCP-MCNC: 4.2 G/DL (ref 3.5–5)
ALP SERPL-CCNC: 76 U/L (ref 46–116)
ALT SERPL W P-5'-P-CCNC: 133 U/L (ref 12–78)
ANION GAP SERPL CALCULATED.3IONS-SCNC: 11 MMOL/L (ref 4–13)
AST SERPL W P-5'-P-CCNC: 48 U/L (ref 5–45)
BASOPHILS # BLD AUTO: 0.04 THOUSANDS/ΜL (ref 0–0.1)
BASOPHILS NFR BLD AUTO: 1 % (ref 0–1)
BILIRUB SERPL-MCNC: 0.8 MG/DL (ref 0.2–1)
BUN SERPL-MCNC: 9 MG/DL (ref 5–25)
CALCIUM SERPL-MCNC: 9.2 MG/DL (ref 8.3–10.1)
CHLORIDE SERPL-SCNC: 100 MMOL/L (ref 100–108)
CHOLEST SERPL-MCNC: 195 MG/DL (ref 50–200)
CO2 SERPL-SCNC: 26 MMOL/L (ref 21–32)
CREAT SERPL-MCNC: 0.93 MG/DL (ref 0.6–1.3)
EOSINOPHIL # BLD AUTO: 0.11 THOUSAND/ΜL (ref 0–0.61)
EOSINOPHIL NFR BLD AUTO: 1 % (ref 0–6)
ERYTHROCYTE [DISTWIDTH] IN BLOOD BY AUTOMATED COUNT: 12.3 % (ref 11.6–15.1)
EST. AVERAGE GLUCOSE BLD GHB EST-MCNC: 111 MG/DL
GFR SERPL CREATININE-BSD FRML MDRD: 111 ML/MIN/1.73SQ M
GLUCOSE P FAST SERPL-MCNC: 106 MG/DL (ref 65–99)
HBA1C MFR BLD: 5.5 %
HCT VFR BLD AUTO: 47.4 % (ref 36.5–49.3)
HDLC SERPL-MCNC: 33 MG/DL
HGB BLD-MCNC: 16.1 G/DL (ref 12–17)
IMM GRANULOCYTES # BLD AUTO: 0.04 THOUSAND/UL (ref 0–0.2)
IMM GRANULOCYTES NFR BLD AUTO: 1 % (ref 0–2)
LDLC SERPL CALC-MCNC: 113 MG/DL (ref 0–100)
LYMPHOCYTES # BLD AUTO: 2.15 THOUSANDS/ΜL (ref 0.6–4.47)
LYMPHOCYTES NFR BLD AUTO: 27 % (ref 14–44)
MCH RBC QN AUTO: 29.7 PG (ref 26.8–34.3)
MCHC RBC AUTO-ENTMCNC: 34 G/DL (ref 31.4–37.4)
MCV RBC AUTO: 87 FL (ref 82–98)
MONOCYTES # BLD AUTO: 0.62 THOUSAND/ΜL (ref 0.17–1.22)
MONOCYTES NFR BLD AUTO: 8 % (ref 4–12)
NEUTROPHILS # BLD AUTO: 5.06 THOUSANDS/ΜL (ref 1.85–7.62)
NEUTS SEG NFR BLD AUTO: 62 % (ref 43–75)
NONHDLC SERPL-MCNC: 162 MG/DL
NRBC BLD AUTO-RTO: 0 /100 WBCS
PLATELET # BLD AUTO: 233 THOUSANDS/UL (ref 149–390)
PMV BLD AUTO: 9 FL (ref 8.9–12.7)
POTASSIUM SERPL-SCNC: 4.4 MMOL/L (ref 3.5–5.3)
PROT SERPL-MCNC: 8.2 G/DL (ref 6.4–8.2)
RBC # BLD AUTO: 5.43 MILLION/UL (ref 3.88–5.62)
SODIUM SERPL-SCNC: 137 MMOL/L (ref 136–145)
TRIGL SERPL-MCNC: 244 MG/DL
TSH SERPL DL<=0.05 MIU/L-ACNC: 1.13 UIU/ML (ref 0.36–3.74)
WBC # BLD AUTO: 8.02 THOUSAND/UL (ref 4.31–10.16)

## 2021-02-27 PROCEDURE — 84443 ASSAY THYROID STIM HORMONE: CPT

## 2021-02-27 PROCEDURE — 87389 HIV-1 AG W/HIV-1&-2 AB AG IA: CPT

## 2021-02-27 PROCEDURE — 83036 HEMOGLOBIN GLYCOSYLATED A1C: CPT

## 2021-02-27 PROCEDURE — 80061 LIPID PANEL: CPT

## 2021-02-27 PROCEDURE — 85025 COMPLETE CBC W/AUTO DIFF WBC: CPT

## 2021-02-27 PROCEDURE — 80053 COMPREHEN METABOLIC PANEL: CPT

## 2021-02-27 PROCEDURE — 86803 HEPATITIS C AB TEST: CPT

## 2021-02-27 PROCEDURE — 36415 COLL VENOUS BLD VENIPUNCTURE: CPT

## 2021-02-28 LAB — HCV AB SER QL: NORMAL

## 2021-03-01 DIAGNOSIS — E78.5 HYPERLIPIDEMIA, UNSPECIFIED HYPERLIPIDEMIA TYPE: ICD-10-CM

## 2021-03-01 LAB — HIV 1+2 AB+HIV1 P24 AG SERPL QL IA: NORMAL

## 2021-03-01 RX ORDER — ATORVASTATIN CALCIUM 20 MG/1
20 TABLET, FILM COATED ORAL DAILY
Qty: 30 TABLET | Refills: 0 | Status: SHIPPED | OUTPATIENT
Start: 2021-03-01 | End: 2021-03-30

## 2021-03-15 ENCOUNTER — OFFICE VISIT (OUTPATIENT)
Dept: INTERNAL MEDICINE CLINIC | Facility: CLINIC | Age: 31
End: 2021-03-15
Payer: MEDICARE

## 2021-03-15 VITALS
SYSTOLIC BLOOD PRESSURE: 134 MMHG | HEIGHT: 68 IN | BODY MASS INDEX: 33.86 KG/M2 | HEART RATE: 105 BPM | WEIGHT: 223.4 LBS | DIASTOLIC BLOOD PRESSURE: 86 MMHG | OXYGEN SATURATION: 97 % | TEMPERATURE: 98.7 F

## 2021-03-15 DIAGNOSIS — E66.01 MORBID OBESITY DUE TO EXCESS CALORIES (HCC): ICD-10-CM

## 2021-03-15 DIAGNOSIS — Z23 NEED FOR TDAP VACCINATION: ICD-10-CM

## 2021-03-15 DIAGNOSIS — K75.81 NASH (NONALCOHOLIC STEATOHEPATITIS): Primary | ICD-10-CM

## 2021-03-15 DIAGNOSIS — F20.9 SCHIZOPHRENIA, UNSPECIFIED TYPE (HCC): ICD-10-CM

## 2021-03-15 DIAGNOSIS — E78.5 HYPERLIPIDEMIA, UNSPECIFIED HYPERLIPIDEMIA TYPE: ICD-10-CM

## 2021-03-15 PROCEDURE — 90715 TDAP VACCINE 7 YRS/> IM: CPT | Performed by: INTERNAL MEDICINE

## 2021-03-15 PROCEDURE — 99214 OFFICE O/P EST MOD 30 MIN: CPT | Performed by: INTERNAL MEDICINE

## 2021-03-15 PROCEDURE — 90471 IMMUNIZATION ADMIN: CPT | Performed by: INTERNAL MEDICINE

## 2021-03-15 NOTE — PROGRESS NOTES
Assessment/Plan:  1  Transaminitis, persistent due to HUERTA - ALT predominance; improved but still elevated; not exercising or dieting; encouraged to do the same  Has had extensive work up in the past which was unrevealing except for hepatic steatosis  2  HUERTA - see #1; no clinical evidence of cirrhosis at this time  3  Obesity - encouraged low fat diet and aerobic exercise in order to lose weight  4  Schizophrenia - on Zyprexa; compliant; currently stable  5  Dyslipidemia - improved; continue lipitor 20mg qd; no need to discontinue it at this time as LFTs are <3x ULN  Subjective:      Patient ID: Alex Mendes is a 35 y o  male  No complaints at this time  Not exercising or eating a healthy diet  No hallucinations or other symptoms consistent with uncontrolled schizophrenia  Review of Systems   Constitutional: Negative for activity change, appetite change, chills, diaphoresis, fatigue and fever  HENT: Negative for rhinorrhea and sore throat  Eyes: Negative for visual disturbance  Respiratory: Negative for cough and shortness of breath  Cardiovascular: Negative for chest pain, palpitations and leg swelling  Gastrointestinal: Negative for abdominal pain, blood in stool, constipation, diarrhea, nausea and vomiting  Genitourinary: Negative for decreased urine volume, difficulty urinating, dysuria, hematuria and urgency  Musculoskeletal: Negative for arthralgias  Skin: Negative for pallor, rash and wound  Neurological: Negative for dizziness, syncope, weakness, light-headedness, numbness and headaches  Psychiatric/Behavioral: Negative for confusion  Objective:      /86 (BP Location: Left arm, Patient Position: Sitting, Cuff Size: Standard)   Pulse 105   Temp 98 7 °F (37 1 °C) (Temporal) Comment: no nsaids  Ht 5' 8" (1 727 m)   Wt 101 kg (223 lb 6 4 oz)   SpO2 97%   BMI 33 97 kg/m²          Physical Exam  Vitals signs reviewed     Constitutional:       Appearance: Normal appearance  He is obese  He is not ill-appearing  HENT:      Head: Normocephalic and atraumatic  Nose: Nose normal       Mouth/Throat:      Mouth: Mucous membranes are moist       Pharynx: Oropharynx is clear  Eyes:      Conjunctiva/sclera: Conjunctivae normal       Pupils: Pupils are equal, round, and reactive to light  Cardiovascular:      Rate and Rhythm: Normal rate and regular rhythm  Pulses: Normal pulses  Heart sounds: Normal heart sounds  No murmur  Pulmonary:      Breath sounds: Normal breath sounds  Abdominal:      General: Abdomen is flat  Bowel sounds are normal  There is no distension  Palpations: Abdomen is soft  Tenderness: There is no abdominal tenderness  Musculoskeletal:         General: No swelling  Right lower leg: No edema  Left lower leg: No edema  Skin:     General: Skin is warm and dry  Neurological:      Mental Status: He is alert and oriented to person, place, and time  Mental status is at baseline  Psychiatric:         Mood and Affect: Mood normal          Behavior: Behavior normal          Thought Content:  Thought content normal

## 2021-03-29 DIAGNOSIS — E78.5 HYPERLIPIDEMIA, UNSPECIFIED HYPERLIPIDEMIA TYPE: ICD-10-CM

## 2021-03-30 RX ORDER — ATORVASTATIN CALCIUM 20 MG/1
TABLET, FILM COATED ORAL
Qty: 30 TABLET | Refills: 1 | Status: SHIPPED | OUTPATIENT
Start: 2021-03-30 | End: 2021-04-24

## 2021-04-23 DIAGNOSIS — E78.5 HYPERLIPIDEMIA, UNSPECIFIED HYPERLIPIDEMIA TYPE: ICD-10-CM

## 2021-04-24 RX ORDER — ATORVASTATIN CALCIUM 20 MG/1
TABLET, FILM COATED ORAL
Qty: 30 TABLET | Refills: 1 | Status: SHIPPED | OUTPATIENT
Start: 2021-04-24 | End: 2021-05-09

## 2021-05-07 DIAGNOSIS — E78.5 HYPERLIPIDEMIA, UNSPECIFIED HYPERLIPIDEMIA TYPE: ICD-10-CM

## 2021-05-09 RX ORDER — ATORVASTATIN CALCIUM 20 MG/1
TABLET, FILM COATED ORAL
Qty: 90 TABLET | Refills: 1 | Status: SHIPPED | OUTPATIENT
Start: 2021-05-09 | End: 2021-07-01 | Stop reason: SDUPTHER

## 2021-07-01 DIAGNOSIS — E78.5 HYPERLIPIDEMIA, UNSPECIFIED HYPERLIPIDEMIA TYPE: ICD-10-CM

## 2021-07-01 RX ORDER — ATORVASTATIN CALCIUM 20 MG/1
20 TABLET, FILM COATED ORAL DAILY
Qty: 90 TABLET | Refills: 1 | Status: SHIPPED | OUTPATIENT
Start: 2021-07-01 | End: 2021-12-22 | Stop reason: SDUPTHER

## 2021-07-09 ENCOUNTER — IMMUNIZATIONS (OUTPATIENT)
Dept: FAMILY MEDICINE CLINIC | Facility: HOSPITAL | Age: 31
End: 2021-07-09

## 2021-07-09 DIAGNOSIS — Z23 ENCOUNTER FOR IMMUNIZATION: Primary | ICD-10-CM

## 2021-07-09 PROCEDURE — 0001A SARS-COV-2 / COVID-19 MRNA VACCINE (PFIZER-BIONTECH) 30 MCG: CPT

## 2021-07-09 PROCEDURE — 91300 SARS-COV-2 / COVID-19 MRNA VACCINE (PFIZER-BIONTECH) 30 MCG: CPT

## 2021-07-30 ENCOUNTER — IMMUNIZATIONS (OUTPATIENT)
Dept: FAMILY MEDICINE CLINIC | Facility: HOSPITAL | Age: 31
End: 2021-07-30

## 2021-07-30 DIAGNOSIS — Z23 ENCOUNTER FOR IMMUNIZATION: Primary | ICD-10-CM

## 2021-07-30 PROCEDURE — 91300 SARS-COV-2 / COVID-19 MRNA VACCINE (PFIZER-BIONTECH) 30 MCG: CPT

## 2021-07-30 PROCEDURE — 0002A SARS-COV-2 / COVID-19 MRNA VACCINE (PFIZER-BIONTECH) 30 MCG: CPT

## 2021-09-11 ENCOUNTER — APPOINTMENT (OUTPATIENT)
Dept: LAB | Facility: HOSPITAL | Age: 31
End: 2021-09-11
Payer: MEDICARE

## 2021-09-11 DIAGNOSIS — K75.81 NASH (NONALCOHOLIC STEATOHEPATITIS): ICD-10-CM

## 2021-09-11 LAB
ALBUMIN SERPL BCP-MCNC: 4.4 G/DL (ref 3.5–5)
ALP SERPL-CCNC: 82 U/L (ref 46–116)
ALT SERPL W P-5'-P-CCNC: 95 U/L (ref 12–78)
ANION GAP SERPL CALCULATED.3IONS-SCNC: 9 MMOL/L (ref 4–13)
AST SERPL W P-5'-P-CCNC: 31 U/L (ref 5–45)
BASOPHILS # BLD AUTO: 0.04 THOUSANDS/ΜL (ref 0–0.1)
BASOPHILS NFR BLD AUTO: 1 % (ref 0–1)
BILIRUB SERPL-MCNC: 0.47 MG/DL (ref 0.2–1)
BUN SERPL-MCNC: 9 MG/DL (ref 5–25)
CALCIUM SERPL-MCNC: 9.1 MG/DL (ref 8.3–10.1)
CHLORIDE SERPL-SCNC: 103 MMOL/L (ref 100–108)
CHOLEST SERPL-MCNC: 133 MG/DL (ref 50–200)
CO2 SERPL-SCNC: 28 MMOL/L (ref 21–32)
CREAT SERPL-MCNC: 0.96 MG/DL (ref 0.6–1.3)
EOSINOPHIL # BLD AUTO: 0.09 THOUSAND/ΜL (ref 0–0.61)
EOSINOPHIL NFR BLD AUTO: 1 % (ref 0–6)
ERYTHROCYTE [DISTWIDTH] IN BLOOD BY AUTOMATED COUNT: 12.2 % (ref 11.6–15.1)
GFR SERPL CREATININE-BSD FRML MDRD: 106 ML/MIN/1.73SQ M
GLUCOSE P FAST SERPL-MCNC: 118 MG/DL (ref 65–99)
HCT VFR BLD AUTO: 45.3 % (ref 36.5–49.3)
HDLC SERPL-MCNC: 31 MG/DL
HGB BLD-MCNC: 15.4 G/DL (ref 12–17)
IMM GRANULOCYTES # BLD AUTO: 0.02 THOUSAND/UL (ref 0–0.2)
IMM GRANULOCYTES NFR BLD AUTO: 0 % (ref 0–2)
LDLC SERPL CALC-MCNC: 68 MG/DL (ref 0–100)
LYMPHOCYTES # BLD AUTO: 2.32 THOUSANDS/ΜL (ref 0.6–4.47)
LYMPHOCYTES NFR BLD AUTO: 31 % (ref 14–44)
MCH RBC QN AUTO: 29.3 PG (ref 26.8–34.3)
MCHC RBC AUTO-ENTMCNC: 34 G/DL (ref 31.4–37.4)
MCV RBC AUTO: 86 FL (ref 82–98)
MONOCYTES # BLD AUTO: 0.58 THOUSAND/ΜL (ref 0.17–1.22)
MONOCYTES NFR BLD AUTO: 8 % (ref 4–12)
NEUTROPHILS # BLD AUTO: 4.39 THOUSANDS/ΜL (ref 1.85–7.62)
NEUTS SEG NFR BLD AUTO: 59 % (ref 43–75)
NRBC BLD AUTO-RTO: 0 /100 WBCS
PLATELET # BLD AUTO: 237 THOUSANDS/UL (ref 149–390)
PMV BLD AUTO: 9.1 FL (ref 8.9–12.7)
POTASSIUM SERPL-SCNC: 3.8 MMOL/L (ref 3.5–5.3)
PROT SERPL-MCNC: 8.2 G/DL (ref 6.4–8.2)
RBC # BLD AUTO: 5.25 MILLION/UL (ref 3.88–5.62)
SODIUM SERPL-SCNC: 140 MMOL/L (ref 136–145)
TRIGL SERPL-MCNC: 171 MG/DL
WBC # BLD AUTO: 7.44 THOUSAND/UL (ref 4.31–10.16)

## 2021-09-11 PROCEDURE — 80053 COMPREHEN METABOLIC PANEL: CPT

## 2021-09-11 PROCEDURE — 80061 LIPID PANEL: CPT

## 2021-09-11 PROCEDURE — 36415 COLL VENOUS BLD VENIPUNCTURE: CPT

## 2021-09-11 PROCEDURE — 85025 COMPLETE CBC W/AUTO DIFF WBC: CPT

## 2021-09-21 ENCOUNTER — OFFICE VISIT (OUTPATIENT)
Dept: INTERNAL MEDICINE CLINIC | Facility: CLINIC | Age: 31
End: 2021-09-21
Payer: MEDICARE

## 2021-09-21 VITALS
HEART RATE: 109 BPM | OXYGEN SATURATION: 98 % | TEMPERATURE: 98.8 F | SYSTOLIC BLOOD PRESSURE: 118 MMHG | BODY MASS INDEX: 33.52 KG/M2 | DIASTOLIC BLOOD PRESSURE: 86 MMHG | WEIGHT: 221.2 LBS | HEIGHT: 68 IN

## 2021-09-21 DIAGNOSIS — K76.0 NON-ALCOHOLIC FATTY LIVER DISEASE: ICD-10-CM

## 2021-09-21 DIAGNOSIS — G47.33 OSA (OBSTRUCTIVE SLEEP APNEA): Primary | ICD-10-CM

## 2021-09-21 DIAGNOSIS — Z23 ENCOUNTER FOR IMMUNIZATION: ICD-10-CM

## 2021-09-21 DIAGNOSIS — F20.9 SCHIZOPHRENIA, UNSPECIFIED TYPE (HCC): ICD-10-CM

## 2021-09-21 DIAGNOSIS — R73.01 IMPAIRED FASTING GLUCOSE: ICD-10-CM

## 2021-09-21 DIAGNOSIS — E78.5 HYPERLIPIDEMIA, UNSPECIFIED HYPERLIPIDEMIA TYPE: ICD-10-CM

## 2021-09-21 DIAGNOSIS — Z72.0 TOBACCO ABUSE: ICD-10-CM

## 2021-09-21 DIAGNOSIS — K21.9 GASTROESOPHAGEAL REFLUX DISEASE WITHOUT ESOPHAGITIS: ICD-10-CM

## 2021-09-21 DIAGNOSIS — E66.01 MORBID OBESITY DUE TO EXCESS CALORIES (HCC): ICD-10-CM

## 2021-09-21 PROCEDURE — 99214 OFFICE O/P EST MOD 30 MIN: CPT | Performed by: INTERNAL MEDICINE

## 2021-09-21 PROCEDURE — 90686 IIV4 VACC NO PRSV 0.5 ML IM: CPT | Performed by: INTERNAL MEDICINE

## 2021-09-21 PROCEDURE — G0008 ADMIN INFLUENZA VIRUS VAC: HCPCS | Performed by: INTERNAL MEDICINE

## 2021-09-21 NOTE — PATIENT INSTRUCTIONS
Lab data reviewed in detail and compared prior    Hyperlipidemia-LDL at goal on atorvastatin however triglycerides remain slightly elevated and HDL low, patient encouraged to increase aerobic exercise  Impaired fasting glucose-check A1c prior to follow-up    Tobacco cessation advised    Witnessed apnea with daytime somnolence and obesity concerning for ARELI-send for overnight polysomnogram and follow accordingly  Weight loss encouraged    Non alcoholic fatty liver disease-LFTs have improved, continue with weight loss efforts, low saturated fat diet and exercise      Routine follow-up after labs in 6 months, sooner as needed  Flu shot today

## 2021-09-21 NOTE — PROGRESS NOTES
Assessment/Plan:    Diagnoses and all orders for this visit:    ARELI (obstructive sleep apnea)  -     Diagnostic Sleep Study; Future    Non-alcoholic fatty liver disease    Gastroesophageal reflux disease without esophagitis    Impaired fasting glucose  -     Hemoglobin A1C; Future    Hyperlipidemia, unspecified hyperlipidemia type  -     CBC and differential; Future  -     Comprehensive metabolic panel; Future  -     Lipid panel; Future  -     TSH, 3rd generation with Free T4 reflex; Future    Morbid obesity due to excess calories (HCC)  -     TSH, 3rd generation with Free T4 reflex; Future    Schizophrenia, unspecified type ([facility] )    Tobacco abuse         BMI Counseling: Body mass index is 33 63 kg/m²  The BMI is above normal  Nutrition recommendations include decreasing portion sizes, decreasing fast food intake, consuming healthier snacks, limiting drinks that contain sugar, moderation in carbohydrate intake and reducing intake of saturated and trans fat  Exercise recommendations include moderate physical activity 150 minutes/week  No pharmacotherapy was ordered  Rationale for BMI follow-up plan is due to patient being overweight or obese  Depression Screening and Follow-up Plan:   Patient was screened for depression during today's encounter  They screened negative with a PHQ-2 score of 0  Patient Instructions   Lab data reviewed in detail and compared prior    Hyperlipidemia-LDL at goal on atorvastatin however triglycerides remain slightly elevated and HDL low, patient encouraged to increase aerobic exercise  Impaired fasting glucose-check A1c prior to follow-up    Tobacco cessation advised    Witnessed apnea with daytime somnolence and obesity concerning for ARELI-send for overnight polysomnogram and follow accordingly  Weight loss encouraged    Non alcoholic fatty liver disease-LFTs have improved, continue with weight loss efforts, low saturated fat diet and exercise      Routine follow-up after labs in 6 months, sooner as needed  Flu shot today      Subjective:      Patient ID: Sloan Barrett is a 28 y o  male    F/u mmp and review labs  Feeling generally well  His mother wants him checked for sleep apnea d/t loud snoring and witnessed apnea, no am headache, no drowsy driving, +/- eds  Schizophrenia has been under stable control on Zyprexa at [facility], f/u q 3 mo w/ psych, therapist monthly  GERD has been stable off Zantac, just drinking water or milk when symptomatic  HPL-tolerating atorvastatin  He has been exercising sporadically taking a walk  Started smoking again last yr, 6-8 per day           Current Outpatient Medications:     atorvastatin (LIPITOR) 20 mg tablet, Take 1 tablet (20 mg total) by mouth daily, Disp: 90 tablet, Rfl: 1    clotrimazole (LOTRIMIN) 1 % cream, APPLY TO AFFECTED AREA EVERY DAY, Disp: , Rfl: 4    desonide (DESOWEN) 0 05 % cream, Apply to affected areas on face 2 times a day as needed, Disp: 15 g, Rfl: 3    hydrocortisone (ANUSOL-HC) 25 mg suppository, Insert 1 suppository (25 mg total) into the rectum 2 (two) times a day, Disp: 12 suppository, Rfl: 3    hydrocortisone 2 5 % cream, Apply to affected areas on face twice daily as needed, Disp: 30 g, Rfl: 0    multivitamin (THERAGRAN) TABS, Take 1 tablet by mouth daily, Disp: , Rfl:     OLANZapine (ZyPREXA) 15 mg tablet, Take 15 mg by mouth daily at bedtime , Disp: , Rfl:     ranitidine (ZANTAC) 150 mg tablet, Take 1 tablet (150 mg total) by mouth 2 (two) times a day (Patient not taking: Reported on 9/21/2021), Disp: 60 tablet, Rfl: 3    Recent Results (from the past 1008 hour(s))   Comprehensive metabolic panel    Collection Time: 09/11/21 11:32 AM   Result Value Ref Range    Sodium 140 136 - 145 mmol/L    Potassium 3 8 3 5 - 5 3 mmol/L    Chloride 103 100 - 108 mmol/L    CO2 28 21 - 32 mmol/L    ANION GAP 9 4 - 13 mmol/L    BUN 9 5 - 25 mg/dL    Creatinine 0 96 0 60 - 1 30 mg/dL    Glucose, Fasting 118 (H) 65 - 99 mg/dL Calcium 9 1 8 3 - 10 1 mg/dL    AST 31 5 - 45 U/L    ALT 95 (H) 12 - 78 U/L    Alkaline Phosphatase 82 46 - 116 U/L    Total Protein 8 2 6 4 - 8 2 g/dL    Albumin 4 4 3 5 - 5 0 g/dL    Total Bilirubin 0 47 0 20 - 1 00 mg/dL    eGFR 106 ml/min/1 73sq m   CBC and differential    Collection Time: 09/11/21 11:32 AM   Result Value Ref Range    WBC 7 44 4 31 - 10 16 Thousand/uL    RBC 5 25 3 88 - 5 62 Million/uL    Hemoglobin 15 4 12 0 - 17 0 g/dL    Hematocrit 45 3 36 5 - 49 3 %    MCV 86 82 - 98 fL    MCH 29 3 26 8 - 34 3 pg    MCHC 34 0 31 4 - 37 4 g/dL    RDW 12 2 11 6 - 15 1 %    MPV 9 1 8 9 - 12 7 fL    Platelets 701 530 - 506 Thousands/uL    nRBC 0 /100 WBCs    Neutrophils Relative 59 43 - 75 %    Immat GRANS % 0 0 - 2 %    Lymphocytes Relative 31 14 - 44 %    Monocytes Relative 8 4 - 12 %    Eosinophils Relative 1 0 - 6 %    Basophils Relative 1 0 - 1 %    Neutrophils Absolute 4 39 1 85 - 7 62 Thousands/µL    Immature Grans Absolute 0 02 0 00 - 0 20 Thousand/uL    Lymphocytes Absolute 2 32 0 60 - 4 47 Thousands/µL    Monocytes Absolute 0 58 0 17 - 1 22 Thousand/µL    Eosinophils Absolute 0 09 0 00 - 0 61 Thousand/µL    Basophils Absolute 0 04 0 00 - 0 10 Thousands/µL   Lipid Panel with Direct LDL reflex    Collection Time: 09/11/21 11:32 AM   Result Value Ref Range    Cholesterol 133 50 - 200 mg/dL    Triglycerides 171 (H) <=150 mg/dL    HDL, Direct 31 (L) >=40 mg/dL    LDL Calculated 68 0 - 100 mg/dL       The following portions of the patient's history were reviewed and updated as appropriate: allergies, current medications, past family history, past medical history, past social history, past surgical history and problem list      Review of Systems   Constitutional: Negative for appetite change, chills, diaphoresis, fatigue, fever and unexpected weight change  HENT: Negative for congestion, hearing loss and rhinorrhea  Eyes: Negative for visual disturbance     Respiratory: Negative for cough, chest tightness, shortness of breath and wheezing  Cardiovascular: Negative for chest pain, palpitations and leg swelling  Gastrointestinal: Negative for abdominal pain and blood in stool  Endocrine: Negative for cold intolerance, heat intolerance, polydipsia and polyuria  Genitourinary: Negative for difficulty urinating, dysuria, frequency and urgency  Musculoskeletal: Negative for arthralgias and myalgias  Skin: Negative for rash  Neurological: Negative for dizziness, weakness, light-headedness and headaches  Hematological: Does not bruise/bleed easily  Psychiatric/Behavioral: Negative for dysphoric mood and sleep disturbance  Objective:      Vitals:    09/21/21 1513   BP: 118/86   Pulse: (!) 109   Temp: 98 8 °F (37 1 °C)   SpO2: 98%          Physical Exam  Constitutional:       Appearance: He is well-developed  HENT:      Head: Normocephalic and atraumatic  Nose: Nose normal    Eyes:      General: No scleral icterus  Conjunctiva/sclera: Conjunctivae normal       Pupils: Pupils are equal, round, and reactive to light  Neck:      Thyroid: No thyromegaly  Vascular: No JVD  Trachea: No tracheal deviation  Cardiovascular:      Rate and Rhythm: Normal rate and regular rhythm  Heart sounds: No murmur heard  No friction rub  No gallop  Pulmonary:      Effort: Pulmonary effort is normal  No respiratory distress  Breath sounds: Normal breath sounds  No wheezing or rales  Musculoskeletal:         General: No deformity  Cervical back: Normal range of motion and neck supple  Lymphadenopathy:      Cervical: No cervical adenopathy  Skin:     General: Skin is warm and dry  Coloration: Skin is not pale  Findings: No erythema or rash  Neurological:      Mental Status: He is alert and oriented to person, place, and time  Cranial Nerves: No cranial nerve deficit  Psychiatric:         Behavior: Behavior normal          Thought Content:  Thought content normal          Judgment: Judgment normal

## 2021-10-18 ENCOUNTER — TELEPHONE (OUTPATIENT)
Dept: SLEEP CENTER | Facility: CLINIC | Age: 31
End: 2021-10-18

## 2021-11-18 ENCOUNTER — HOSPITAL ENCOUNTER (OUTPATIENT)
Dept: SLEEP CENTER | Facility: CLINIC | Age: 31
Discharge: HOME | End: 2021-11-18
Payer: MEDICARE

## 2021-11-18 DIAGNOSIS — G47.33 OSA (OBSTRUCTIVE SLEEP APNEA): ICD-10-CM

## 2021-11-18 PROCEDURE — 95810 POLYSOM 6/> YRS 4/> PARAM: CPT

## 2021-11-18 PROCEDURE — 95810 POLYSOM 6/> YRS 4/> PARAM: CPT | Performed by: INTERNAL MEDICINE

## 2021-11-23 ENCOUNTER — TELEPHONE (OUTPATIENT)
Dept: INTERNAL MEDICINE CLINIC | Facility: CLINIC | Age: 31
End: 2021-11-23

## 2021-11-24 ENCOUNTER — TELEPHONE (OUTPATIENT)
Dept: SLEEP CENTER | Facility: CLINIC | Age: 31
End: 2021-11-24

## 2021-12-22 DIAGNOSIS — E78.5 HYPERLIPIDEMIA, UNSPECIFIED HYPERLIPIDEMIA TYPE: ICD-10-CM

## 2021-12-22 RX ORDER — ATORVASTATIN CALCIUM 20 MG/1
20 TABLET, FILM COATED ORAL DAILY
Qty: 90 TABLET | Refills: 0 | Status: SHIPPED | OUTPATIENT
Start: 2021-12-22 | End: 2022-03-16

## 2022-01-15 ENCOUNTER — OFFICE VISIT (OUTPATIENT)
Dept: INTERNAL MEDICINE CLINIC | Facility: CLINIC | Age: 32
End: 2022-01-15
Payer: MEDICARE

## 2022-01-15 VITALS
RESPIRATION RATE: 16 BRPM | BODY MASS INDEX: 34.56 KG/M2 | WEIGHT: 228 LBS | DIASTOLIC BLOOD PRESSURE: 82 MMHG | HEART RATE: 78 BPM | HEIGHT: 68 IN | SYSTOLIC BLOOD PRESSURE: 116 MMHG

## 2022-01-15 DIAGNOSIS — E66.01 MORBID OBESITY DUE TO EXCESS CALORIES (HCC): ICD-10-CM

## 2022-01-15 DIAGNOSIS — G47.33 OSA (OBSTRUCTIVE SLEEP APNEA): Primary | ICD-10-CM

## 2022-01-15 PROCEDURE — G0438 PPPS, INITIAL VISIT: HCPCS | Performed by: INTERNAL MEDICINE

## 2022-01-15 PROCEDURE — 99213 OFFICE O/P EST LOW 20 MIN: CPT | Performed by: INTERNAL MEDICINE

## 2022-01-15 NOTE — PROGRESS NOTES
Assessment and Plan:     Problem List Items Addressed This Visit     None           Preventive health issues were discussed with patient, and age appropriate screening tests were ordered as noted in patient's After Visit Summary  Personalized health advice and appropriate referrals for health education or preventive services given if needed, as noted in patient's After Visit Summary  History of Present Illness:     Patient presents for Medicare Annual Wellness visit    Patient Care Team:  Natalie Hardy MD as PCP - MD Marcella Garcia MD as Endoscopist     Problem List:     Patient Active Problem List   Diagnosis    Gastroesophageal reflux disease    Hyperlipidemia    Impaired fasting glucose    Morbid obesity [facility] to excess calories [facility] [address] [facility]    Schizophrenia ([facility] [address] [facility]    Non-alcoholic fatty liver disease    Toe swelling    Psychiatric disorder    MRSA (methicillin resistant staph aureus) culture positive    Tobacco abuse    ARELI (obstructive sleep apnea)    Sleep related hypoxia      Past Medical and Surgical History:     Past Medical History:   Diagnosis Date    GERD (gastroesophageal reflux disease)     H  pylori infection     Hematochezia     Hypercholesteremia     Hyperlipidemia     Psychosis ([facility] [address] )     Last Assessed:  11/19/15    Schizophrenia [facility])      Past Surgical History:   Procedure Laterality Date    COLONOSCOPY      TX COLONOSCOPY FLX DX W/COLLJ SPEC WHEN PFRMD N/A 9/27/2017    Procedure: COLONOSCOPY;  Surgeon: Marcella Chakraborty MD;  Location: [facility];   Service: Gastroenterology      Family History:     Family History   Problem Relation Age of Onset    Hypertension Family     Cancer Family       Social History:     Social History     Socioeconomic History    Marital status: Single     Spouse name: None    Number of children: None    Years of education: None    Highest education level: None   Occupational History    None   Tobacco Use    Smoking status: Current Some Day Smoker     Packs/day: 0 25     Types: Cigarettes    Smokeless tobacco: Never Used    Tobacco comment: 2-3 cigarettes a day   Vaping Use    Vaping Use: Never used   Substance and Sexual Activity    Alcohol use: Yes     Comment: occ    Drug use: No    Sexual activity: None   Other Topics Concern    None   Social History Narrative    None     Social Determinants of Health     Financial Resource Strain: Not on file   Food Insecurity: Not on file   Transportation Needs: Not on file   Physical Activity: Inactive    Days of Exercise per Week: 0 days    Minutes of Exercise per Session: 0 min   Stress: No Stress Concern Present    Feeling of Stress : Not at all   Social Connections: Not on file   Intimate Partner Violence: Not on file   Housing Stability: Not on file      Medications and Allergies:     Current Outpatient Medications   Medication Sig Dispense Refill    atorvastatin (LIPITOR) 20 mg tablet Take 1 tablet (20 mg total) by mouth daily 90 tablet 0    multivitamin (THERAGRAN) TABS Take 1 tablet by mouth daily      OLANZapine (ZyPREXA) 15 mg tablet Take 15 mg by mouth daily at bedtime       clotrimazole (LOTRIMIN) 1 % cream APPLY TO AFFECTED AREA EVERY DAY (Patient not taking: Reported on 1/15/2022)  4    desonide (DESOWEN) 0 05 % cream Apply to affected areas on face 2 times a day as needed (Patient not taking: Reported on 1/15/2022 ) 15 g 3    hydrocortisone (ANUSOL-HC) 25 mg suppository Insert 1 suppository (25 mg total) into the rectum 2 (two) times a day (Patient not taking: Reported on 1/15/2022 ) 12 suppository 3    hydrocortisone 2 5 % cream Apply to affected areas on face twice daily as needed (Patient not taking: Reported on 1/15/2022 ) 30 g 0    ranitidine (ZANTAC) 150 mg tablet Take 1 tablet (150 mg total) by mouth 2 (two) times a day (Patient not taking: Reported on 9/21/2021) 60 tablet 3     No current facility-administered medications for this visit       No Known Allergies   Immunizations:     Immunization History   Administered Date(s) Administered    COVID-19 PFIZER VACCINE 0 3 ML IM 07/09/2021, 07/30/2021    DTP 1991, 1991, 1991, 11/12/1992, 11/01/1997    Hep B, Adolescent or Pediatric 07/29/1997, 09/02/2004, 04/07/2005    Hepatitis A 07/29/1997    HiB 11/02/1992    INFLUENZA 11/19/2015, 10/31/2017    Influenza Quadrivalent, 6-35 Months IM 11/19/2015, 10/31/2017    Influenza, injectable, quadrivalent, preservative free 0 5 mL 09/21/2021    MMR 04/15/1992, 09/02/2004    OPV 1991, 1991, 1991, 11/02/1992, 11/01/1997    Td (adult), adsorbed 06/25/2004    Tdap 03/15/2021    Varicella 09/02/2004, 04/07/2005      Health Maintenance:         Topic Date Due    Colorectal Cancer Screening  09/27/2022    HIV Screening  Completed    Hepatitis C Screening  Completed         Topic Date Due    Pneumococcal Vaccine: Pediatrics (0 to 5 Years) and At-Risk Patients (6 to 59 Years) (1 of 2 - PPSV23) Never done    Hepatitis A Vaccine (2 of 2 - 2-dose series) 01/29/1998      Medicare Health Risk Assessment:     /82 (BP Location: Left arm, Patient Position: Sitting, Cuff Size: Standard)   Pulse 78   Resp 16   Ht 5' 8" (1 727 m)   Wt 103 kg (228 lb)   BMI 34 67 kg/m²      Vani Tran is here for his Initial Wellness visit  Health Risk Assessment:   Patient rates overall health as excellent  Patient feels that their physical health rating is slightly better  Patient is satisfied with their life  Eyesight was rated as same  Hearing was rated as same  Patient feels that their emotional and mental health rating is same  Patients states they are never, rarely angry  Patient states they are never, rarely unusually tired/fatigued  Pain experienced in the last 7 days has been none  Patient states that he has experienced no weight loss or gain in last 6 months  Depression Screening:   PHQ-2 Score: 0      Fall Risk Screening:    In the past year, patient has experienced: no history of falling in past year      Home Safety:  Patient does not have trouble with stairs inside or outside of their home  Patient has working smoke alarms and has no working carbon monoxide detector  Home safety hazards include: none  Nutrition:   Current diet is Regular and Low Cholesterol  Medications:   Patient is not currently taking any over-the-counter supplements  Patient is able to manage medications  Activities of Daily Living (ADLs)/Instrumental Activities of Daily Living (IADLs):   Walk and transfer into and out of bed and chair?: Yes  Dress and groom yourself?: Yes    Bathe or shower yourself?: Yes    Feed yourself? Yes  Do your laundry/housekeeping?: Yes  Manage your money, pay your bills and track your expenses?: Yes  Make your own meals?: Yes    Do your own shopping?: Yes    Previous Hospitalizations:   Any hospitalizations or ED visits within the last 12 months?: No      Advance Care Planning:   Living will: Yes    Advanced directive: Yes      Cognitive Screening:   Provider or family/friend/caregiver concerned regarding cognition?: No    PREVENTIVE SCREENINGS      Cardiovascular Screening:    General: Screening Not Indicated and History Lipid Disorder      Diabetes Screening:     General: Screening Current      Colorectal Cancer Screening:     General: Screening Current      Prostate Cancer Screening:    General: Screening Not Indicated      Osteoporosis Screening:    General: Screening Not Indicated      Abdominal Aortic Aneurysm (AAA) Screening:    Risk factors include: tobacco use        Lung Cancer Screening:     General: Screening Not Indicated      Hepatitis C Screening:    General: Screening Current    Screening, Brief Intervention, and Referral to Treatment (SBIRT)    Screening  Typical number of drinks in a day: 1  Typical number of drinks in a week: 2  Interpretation: Low risk drinking behavior        Miriam Edmond MD

## 2022-01-15 NOTE — PROGRESS NOTES
Assessment/Plan:    Diagnoses and all orders for this visit:    APRIL (obstructive sleep apnea)              There are no Patient Instructions on file for this visit  Subjective:      Patient ID: Shoshana Rodriguez is a 28 y o  male    F/u april and awv  Feeling the same, lots of fatigue, no am headache, wakes feeling groggy but rested  New job at [employer]  Current Outpatient Medications:     atorvastatin (LIPITOR) 20 mg tablet, Take 1 tablet (20 mg total) by mouth daily, Disp: 90 tablet, Rfl: 0    multivitamin (THERAGRAN) TABS, Take 1 tablet by mouth daily, Disp: , Rfl:     OLANZapine (ZyPREXA) 15 mg tablet, Take 15 mg by mouth daily at bedtime , Disp: , Rfl:     clotrimazole (LOTRIMIN) 1 % cream, APPLY TO AFFECTED AREA EVERY DAY (Patient not taking: Reported on 1/15/2022), Disp: , Rfl: 4    desonide (DESOWEN) 0 05 % cream, Apply to affected areas on face 2 times a day as needed (Patient not taking: Reported on 1/15/2022 ), Disp: 15 g, Rfl: 3    hydrocortisone (ANUSOL-HC) 25 mg suppository, Insert 1 suppository (25 mg total) into the rectum 2 (two) times a day (Patient not taking: Reported on 1/15/2022 ), Disp: 12 suppository, Rfl: 3    hydrocortisone 2 5 % cream, Apply to affected areas on face twice daily as needed (Patient not taking: Reported on 1/15/2022 ), Disp: 30 g, Rfl: 0    ranitidine (ZANTAC) 150 mg tablet, Take 1 tablet (150 mg total) by mouth 2 (two) times a day (Patient not taking: Reported on 9/21/2021), Disp: 60 tablet, Rfl: 3    No results found for this or any previous visit (from the past 1008 hour(s))  The following portions of the patient's history were reviewed and updated as appropriate: allergies, current medications, past family history, past medical history, past social history, past surgical history and problem list      Review of Systems   Constitutional: Negative for chills and fever  HENT: Negative for ear pain and sore throat      Eyes: Negative for pain and visual disturbance  Respiratory: Negative for cough and shortness of breath  Cardiovascular: Negative for chest pain and palpitations  Gastrointestinal: Negative for abdominal pain and vomiting  Genitourinary: Negative for dysuria and hematuria  Musculoskeletal: Negative for arthralgias and back pain  Skin: Negative for color change and rash  Neurological: Negative for seizures and syncope  All other systems reviewed and are negative  Objective:      Vitals:    01/15/22 1031   BP: 116/82   Pulse: 78   Resp: 16          Physical Exam  Constitutional:       Appearance: He is well-developed  HENT:      Head: Normocephalic and atraumatic  Pulmonary:      Effort: Pulmonary effort is normal    Neurological:      Mental Status: He is alert and oriented to person, place, and time  Psychiatric:         Behavior: Behavior normal  Behavior is cooperative  Thought Content:  Thought content normal          Judgment: Judgment normal

## 2022-01-15 NOTE — PATIENT INSTRUCTIONS
Sleep study reviewed-severe obstructive sleep apnea  Pathophysiology discussed  Will start CPAP with nasal cushions  Patient understands will need 1 month follow-up with chip data for compliance and efficacy

## 2022-03-16 DIAGNOSIS — E78.5 HYPERLIPIDEMIA, UNSPECIFIED HYPERLIPIDEMIA TYPE: ICD-10-CM

## 2022-03-16 RX ORDER — ATORVASTATIN CALCIUM 20 MG/1
TABLET, FILM COATED ORAL
Qty: 90 TABLET | Refills: 0 | Status: SHIPPED | OUTPATIENT
Start: 2022-03-16 | End: 2022-04-11 | Stop reason: SDUPTHER

## 2022-03-25 ENCOUNTER — IMMUNIZATIONS (OUTPATIENT)
Dept: FAMILY MEDICINE CLINIC | Facility: HOSPITAL | Age: 32
End: 2022-03-25

## 2022-03-25 PROCEDURE — 0051A COVID-19 PFIZER VACC TRIS-SUCROSE GRAY CAP 0.3 ML: CPT

## 2022-03-25 PROCEDURE — 91305 COVID-19 PFIZER VACC TRIS-SUCROSE GRAY CAP 0.3 ML: CPT

## 2022-04-09 ENCOUNTER — APPOINTMENT (OUTPATIENT)
Dept: LAB | Facility: HOSPITAL | Age: 32
End: 2022-04-09
Attending: INTERNAL MEDICINE
Payer: MEDICARE

## 2022-04-09 DIAGNOSIS — R73.01 IMPAIRED FASTING GLUCOSE: ICD-10-CM

## 2022-04-09 DIAGNOSIS — E66.01 MORBID OBESITY DUE TO EXCESS CALORIES (HCC): ICD-10-CM

## 2022-04-09 DIAGNOSIS — E78.5 HYPERLIPIDEMIA, UNSPECIFIED HYPERLIPIDEMIA TYPE: ICD-10-CM

## 2022-04-09 LAB
ALBUMIN SERPL BCP-MCNC: 4.1 G/DL (ref 3.5–5)
ALP SERPL-CCNC: 71 U/L (ref 46–116)
ALT SERPL W P-5'-P-CCNC: 74 U/L (ref 12–78)
ANION GAP SERPL CALCULATED.3IONS-SCNC: 8 MMOL/L (ref 4–13)
AST SERPL W P-5'-P-CCNC: 29 U/L (ref 5–45)
BASOPHILS # BLD AUTO: 0.04 THOUSANDS/ΜL (ref 0–0.1)
BASOPHILS NFR BLD AUTO: 1 % (ref 0–1)
BILIRUB SERPL-MCNC: 0.83 MG/DL (ref 0.2–1)
BUN SERPL-MCNC: 12 MG/DL (ref 5–25)
CALCIUM SERPL-MCNC: 8.7 MG/DL (ref 8.3–10.1)
CHLORIDE SERPL-SCNC: 101 MMOL/L (ref 100–108)
CHOLEST SERPL-MCNC: 160 MG/DL
CO2 SERPL-SCNC: 26 MMOL/L (ref 21–32)
CREAT SERPL-MCNC: 0.99 MG/DL (ref 0.6–1.3)
EOSINOPHIL # BLD AUTO: 0.11 THOUSAND/ΜL (ref 0–0.61)
EOSINOPHIL NFR BLD AUTO: 2 % (ref 0–6)
ERYTHROCYTE [DISTWIDTH] IN BLOOD BY AUTOMATED COUNT: 12.4 % (ref 11.6–15.1)
EST. AVERAGE GLUCOSE BLD GHB EST-MCNC: 117 MG/DL
GFR SERPL CREATININE-BSD FRML MDRD: 101 ML/MIN/1.73SQ M
GLUCOSE P FAST SERPL-MCNC: 115 MG/DL (ref 65–99)
HBA1C MFR BLD: 5.7 %
HCT VFR BLD AUTO: 43.7 % (ref 36.5–49.3)
HDLC SERPL-MCNC: 31 MG/DL
HGB BLD-MCNC: 15 G/DL (ref 12–17)
IMM GRANULOCYTES # BLD AUTO: 0.04 THOUSAND/UL (ref 0–0.2)
IMM GRANULOCYTES NFR BLD AUTO: 1 % (ref 0–2)
LDLC SERPL CALC-MCNC: 106 MG/DL (ref 0–100)
LYMPHOCYTES # BLD AUTO: 2.28 THOUSANDS/ΜL (ref 0.6–4.47)
LYMPHOCYTES NFR BLD AUTO: 31 % (ref 14–44)
MCH RBC QN AUTO: 29.7 PG (ref 26.8–34.3)
MCHC RBC AUTO-ENTMCNC: 34.3 G/DL (ref 31.4–37.4)
MCV RBC AUTO: 87 FL (ref 82–98)
MONOCYTES # BLD AUTO: 0.6 THOUSAND/ΜL (ref 0.17–1.22)
MONOCYTES NFR BLD AUTO: 8 % (ref 4–12)
NEUTROPHILS # BLD AUTO: 4.34 THOUSANDS/ΜL (ref 1.85–7.62)
NEUTS SEG NFR BLD AUTO: 57 % (ref 43–75)
NONHDLC SERPL-MCNC: 129 MG/DL
NRBC BLD AUTO-RTO: 0 /100 WBCS
PLATELET # BLD AUTO: 239 THOUSANDS/UL (ref 149–390)
PMV BLD AUTO: 9.1 FL (ref 8.9–12.7)
POTASSIUM SERPL-SCNC: 3.6 MMOL/L (ref 3.5–5.3)
PROT SERPL-MCNC: 7.7 G/DL (ref 6.4–8.2)
RBC # BLD AUTO: 5.05 MILLION/UL (ref 3.88–5.62)
SODIUM SERPL-SCNC: 135 MMOL/L (ref 136–145)
TRIGL SERPL-MCNC: 115 MG/DL
TSH SERPL DL<=0.05 MIU/L-ACNC: 1.27 UIU/ML (ref 0.45–4.5)
WBC # BLD AUTO: 7.41 THOUSAND/UL (ref 4.31–10.16)

## 2022-04-09 PROCEDURE — 80053 COMPREHEN METABOLIC PANEL: CPT

## 2022-04-09 PROCEDURE — 85025 COMPLETE CBC W/AUTO DIFF WBC: CPT

## 2022-04-09 PROCEDURE — 84443 ASSAY THYROID STIM HORMONE: CPT

## 2022-04-09 PROCEDURE — 36415 COLL VENOUS BLD VENIPUNCTURE: CPT

## 2022-04-09 PROCEDURE — 80061 LIPID PANEL: CPT

## 2022-04-09 PROCEDURE — 83036 HEMOGLOBIN GLYCOSYLATED A1C: CPT

## 2022-04-11 ENCOUNTER — TELEPHONE (OUTPATIENT)
Dept: INTERNAL MEDICINE CLINIC | Facility: CLINIC | Age: 32
End: 2022-04-11

## 2022-04-11 ENCOUNTER — OFFICE VISIT (OUTPATIENT)
Dept: INTERNAL MEDICINE CLINIC | Facility: CLINIC | Age: 32
End: 2022-04-11
Payer: MEDICARE

## 2022-04-11 VITALS
DIASTOLIC BLOOD PRESSURE: 90 MMHG | RESPIRATION RATE: 16 BRPM | WEIGHT: 231 LBS | HEIGHT: 68 IN | OXYGEN SATURATION: 99 % | BODY MASS INDEX: 35.01 KG/M2 | SYSTOLIC BLOOD PRESSURE: 137 MMHG | HEART RATE: 84 BPM | TEMPERATURE: 99.1 F

## 2022-04-11 DIAGNOSIS — K76.0 NON-ALCOHOLIC FATTY LIVER DISEASE: ICD-10-CM

## 2022-04-11 DIAGNOSIS — I10 PRIMARY HYPERTENSION: ICD-10-CM

## 2022-04-11 DIAGNOSIS — K21.9 GASTROESOPHAGEAL REFLUX DISEASE WITHOUT ESOPHAGITIS: Primary | ICD-10-CM

## 2022-04-11 DIAGNOSIS — Z72.0 TOBACCO ABUSE: ICD-10-CM

## 2022-04-11 DIAGNOSIS — E78.5 HYPERLIPIDEMIA, UNSPECIFIED HYPERLIPIDEMIA TYPE: ICD-10-CM

## 2022-04-11 DIAGNOSIS — F20.9 SCHIZOPHRENIA, UNSPECIFIED TYPE (HCC): ICD-10-CM

## 2022-04-11 DIAGNOSIS — G47.33 OSA (OBSTRUCTIVE SLEEP APNEA): ICD-10-CM

## 2022-04-11 DIAGNOSIS — E66.01 MORBID OBESITY DUE TO EXCESS CALORIES (HCC): ICD-10-CM

## 2022-04-11 DIAGNOSIS — R73.01 IMPAIRED FASTING GLUCOSE: ICD-10-CM

## 2022-04-11 PROCEDURE — 99214 OFFICE O/P EST MOD 30 MIN: CPT | Performed by: INTERNAL MEDICINE

## 2022-04-11 RX ORDER — ATORVASTATIN CALCIUM 20 MG/1
20 TABLET, FILM COATED ORAL DAILY
Qty: 90 TABLET | Refills: 3 | Status: SHIPPED | OUTPATIENT
Start: 2022-04-11

## 2022-04-11 NOTE — TELEPHONE ENCOUNTER
S/W [facility] Chantale Johnson she stated pt disconnected call  Phone number reviewed  Chantale Johnson will attempt to reach Pt now  Call ended

## 2022-04-11 NOTE — PROGRESS NOTES
Assessment/Plan:    Diagnoses and all orders for this visit:    Gastroesophageal reflux disease without esophagitis    Non-alcoholic fatty liver disease    Impaired fasting glucose  -     Hemoglobin A1C; Future    ARELI (obstructive sleep apnea)    Hyperlipidemia, unspecified hyperlipidemia type  -     CBC and differential; Future  -     Comprehensive metabolic panel; Future  -     Lipid panel; Future    Morbid obesity due to excess calories (HCC)  -     Uric acid; Future    Schizophrenia, unspecified type ([facility] [address] )    Tobacco abuse    Primary hypertension              Patient Instructions   Lab data reviewed in detail and compared to prior    Obstructive sleep apnea-will resend prescription for CPAP supplies    Hyperlipidemia-continue on atorvastatin, refill a corporate statin    GERD stable off of medication, continue with weight loss efforts    Hypertension-borderline today  Patient encouraged to increase aerobic exercise, low-sodium diet and weight loss efforts  Impaired fasting glucose-low carb diet, exercise and weight loss advised    Schizophrenia stable under care of Psychiatry    Routine follow-up after labs in 6 months, sooner as needed  Subjective:      Patient ID: Pablo Trevizo is a 33 y o  male    F/u mmp and review labs  Feeling generally well, new job w/ [employer] x 1 mo  ARELI-never got CPAP, waiting for delivery  Schizophrenia has been under stable control on Zyprexa at [facility], f/u q 3 mo w/ psych, therapist monthly  GERD has been stable off Zantac, just drinking water or milk when symptomatic  HPL-tolerating atorvastatin, ran out last week  No regular exercise lately  Started smoking again last yr, 6-8 per day           Current Outpatient Medications:     atorvastatin (LIPITOR) 20 mg tablet, TAKE 1 TABLET BY MOUTH EVERY DAY, Disp: 90 tablet, Rfl: 0    multivitamin (THERAGRAN) TABS, Take 1 tablet by mouth daily, Disp: , Rfl:     OLANZapine (ZyPREXA) 15 mg tablet, Take 15 mg by mouth daily at bedtime , Disp: , Rfl:     clotrimazole (LOTRIMIN) 1 % cream, APPLY TO AFFECTED AREA EVERY DAY, Disp: , Rfl: 4    desonide (DESOWEN) 0 05 % cream, Apply to affected areas on face 2 times a day as needed (Patient not taking: Reported on 1/15/2022 ), Disp: 15 g, Rfl: 3    hydrocortisone (ANUSOL-HC) 25 mg suppository, Insert 1 suppository (25 mg total) into the rectum 2 (two) times a day (Patient not taking: Reported on 1/15/2022 ), Disp: 12 suppository, Rfl: 3    hydrocortisone 2 5 % cream, Apply to affected areas on face twice daily as needed (Patient not taking: Reported on 1/15/2022 ), Disp: 30 g, Rfl: 0    ranitidine (ZANTAC) 150 mg tablet, Take 1 tablet (150 mg total) by mouth 2 (two) times a day (Patient not taking: Reported on 9/21/2021), Disp: 60 tablet, Rfl: 3    Recent Results (from the past 1008 hour(s))   CBC and differential    Collection Time: 04/09/22  9:13 AM   Result Value Ref Range    WBC 7 41 4 31 - 10 16 Thousand/uL    RBC 5 05 3 88 - 5 62 Million/uL    Hemoglobin 15 0 12 0 - 17 0 g/dL    Hematocrit 43 7 36 5 - 49 3 %    MCV 87 82 - 98 fL    MCH 29 7 26 8 - 34 3 pg    MCHC 34 3 31 4 - 37 4 g/dL    RDW 12 4 11 6 - 15 1 %    MPV 9 1 8 9 - 12 7 fL    Platelets 696 710 - 914 Thousands/uL    nRBC 0 /100 WBCs    Neutrophils Relative 57 43 - 75 %    Immat GRANS % 1 0 - 2 %    Lymphocytes Relative 31 14 - 44 %    Monocytes Relative 8 4 - 12 %    Eosinophils Relative 2 0 - 6 %    Basophils Relative 1 0 - 1 %    Neutrophils Absolute 4 34 1 85 - 7 62 Thousands/µL    Immature Grans Absolute 0 04 0 00 - 0 20 Thousand/uL    Lymphocytes Absolute 2 28 0 60 - 4 47 Thousands/µL    Monocytes Absolute 0 60 0 17 - 1 22 Thousand/µL    Eosinophils Absolute 0 11 0 00 - 0 61 Thousand/µL    Basophils Absolute 0 04 0 00 - 0 10 Thousands/µL   Comprehensive metabolic panel    Collection Time: 04/09/22  9:13 AM   Result Value Ref Range    Sodium 135 (L) 136 - 145 mmol/L    Potassium 3 6 3 5 - 5 3 mmol/L    Chloride 101 100 - 108 mmol/L    CO2 26 21 - 32 mmol/L    ANION GAP 8 4 - 13 mmol/L    BUN 12 5 - 25 mg/dL    Creatinine 0 99 0 60 - 1 30 mg/dL    Glucose, Fasting 115 (H) 65 - 99 mg/dL    Calcium 8 7 8 3 - 10 1 mg/dL    AST 29 5 - 45 U/L    ALT 74 12 - 78 U/L    Alkaline Phosphatase 71 46 - 116 U/L    Total Protein 7 7 6 4 - 8 2 g/dL    Albumin 4 1 3 5 - 5 0 g/dL    Total Bilirubin 0 83 0 20 - 1 00 mg/dL    eGFR 101 ml/min/1 73sq m   Lipid panel    Collection Time: 04/09/22  9:13 AM   Result Value Ref Range    Cholesterol 160 See Comment mg/dL    Triglycerides 115 See Comment mg/dL    HDL, Direct 31 (L) >=40 mg/dL    LDL Calculated 106 (H) 0 - 100 mg/dL    Non-HDL-Chol (CHOL-HDL) 129 mg/dl   Hemoglobin A1C    Collection Time: 04/09/22  9:13 AM   Result Value Ref Range    Hemoglobin A1C 5 7 (H) Normal 3 8-5 6%; PreDiabetic 5 7-6 4%; Diabetic >=6 5%; Glycemic control for adults with diabetes <7 0% %     mg/dl   TSH, 3rd generation with Free T4 reflex    Collection Time: 04/09/22  9:13 AM   Result Value Ref Range    TSH 3RD GENERATON 1 275 0 450 - 4 500 uIU/mL       The following portions of the patient's history were reviewed and updated as appropriate: allergies, current medications, past family history, past medical history, past social history, past surgical history and problem list      Review of Systems   Constitutional: Negative for appetite change, chills, diaphoresis, fatigue, fever and unexpected weight change  HENT: Negative for congestion, hearing loss and rhinorrhea  Eyes: Negative for visual disturbance  Respiratory: Negative for cough, chest tightness, shortness of breath and wheezing  Cardiovascular: Negative for chest pain, palpitations and leg swelling  Gastrointestinal: Negative for abdominal pain and blood in stool  Endocrine: Negative for cold intolerance, heat intolerance, polydipsia and polyuria  Genitourinary: Negative for difficulty urinating, dysuria, frequency and urgency  Musculoskeletal: Negative for arthralgias and myalgias  Skin: Negative for rash  Neurological: Negative for dizziness, weakness, light-headedness and headaches  Hematological: Does not bruise/bleed easily  Psychiatric/Behavioral: Negative for dysphoric mood and sleep disturbance  Objective:      Vitals:    04/11/22 1235   BP: 137/90   Pulse:    Resp:    Temp:    SpO2:           Physical Exam  Constitutional:       Appearance: He is well-developed  HENT:      Head: Normocephalic and atraumatic  Nose: Nose normal    Eyes:      General: No scleral icterus  Conjunctiva/sclera: Conjunctivae normal       Pupils: Pupils are equal, round, and reactive to light  Neck:      Thyroid: No thyromegaly  Vascular: No JVD  Trachea: No tracheal deviation  Cardiovascular:      Rate and Rhythm: Normal rate and regular rhythm  Heart sounds: No murmur heard  No friction rub  No gallop  Pulmonary:      Effort: Pulmonary effort is normal  No respiratory distress  Breath sounds: Normal breath sounds  No wheezing or rales  Musculoskeletal:         General: No deformity  Cervical back: Normal range of motion and neck supple  Lymphadenopathy:      Cervical: No cervical adenopathy  Skin:     General: Skin is warm and dry  Coloration: Skin is not pale  Findings: No erythema or rash  Neurological:      Mental Status: He is alert and oriented to person, place, and time  Cranial Nerves: No cranial nerve deficit  Psychiatric:         Behavior: Behavior normal          Thought Content:  Thought content normal          Judgment: Judgment normal

## 2022-04-11 NOTE — PATIENT INSTRUCTIONS
Lab data reviewed in detail and compared to prior    Obstructive sleep apnea-will resend prescription for CPAP supplies    Hyperlipidemia-continue on atorvastatin, refill a corporate statin    GERD stable off of medication, continue with weight loss efforts    Hypertension-borderline today  Patient encouraged to increase aerobic exercise, low-sodium diet and weight loss efforts  Impaired fasting glucose-low carb diet, exercise and weight loss advised    Schizophrenia stable under care of Psychiatry    Routine follow-up after labs in 6 months, sooner as needed

## 2022-07-21 ENCOUNTER — TELEPHONE (OUTPATIENT)
Dept: GASTROENTEROLOGY | Facility: CLINIC | Age: 32
End: 2022-07-21

## 2022-10-05 ENCOUNTER — APPOINTMENT (OUTPATIENT)
Dept: LAB | Facility: HOSPITAL | Age: 32
End: 2022-10-05
Payer: MEDICARE

## 2022-10-05 DIAGNOSIS — E78.5 HYPERLIPIDEMIA, UNSPECIFIED HYPERLIPIDEMIA TYPE: ICD-10-CM

## 2022-10-05 DIAGNOSIS — R73.01 IMPAIRED FASTING GLUCOSE: ICD-10-CM

## 2022-10-05 DIAGNOSIS — E66.01 MORBID OBESITY DUE TO EXCESS CALORIES (HCC): ICD-10-CM

## 2022-10-05 LAB
ALBUMIN SERPL BCP-MCNC: 4.3 G/DL (ref 3.5–5)
ALP SERPL-CCNC: 81 U/L (ref 46–116)
ALT SERPL W P-5'-P-CCNC: 101 U/L (ref 12–78)
ANION GAP SERPL CALCULATED.3IONS-SCNC: 6 MMOL/L (ref 4–13)
AST SERPL W P-5'-P-CCNC: 39 U/L (ref 5–45)
BASOPHILS # BLD AUTO: 0.04 THOUSANDS/ΜL (ref 0–0.1)
BASOPHILS NFR BLD AUTO: 1 % (ref 0–1)
BILIRUB SERPL-MCNC: 0.45 MG/DL (ref 0.2–1)
BUN SERPL-MCNC: 11 MG/DL (ref 5–25)
CALCIUM SERPL-MCNC: 9.1 MG/DL (ref 8.3–10.1)
CHLORIDE SERPL-SCNC: 102 MMOL/L (ref 96–108)
CHOLEST SERPL-MCNC: 130 MG/DL
CO2 SERPL-SCNC: 27 MMOL/L (ref 21–32)
CREAT SERPL-MCNC: 0.96 MG/DL (ref 0.6–1.3)
EOSINOPHIL # BLD AUTO: 0.11 THOUSAND/ΜL (ref 0–0.61)
EOSINOPHIL NFR BLD AUTO: 2 % (ref 0–6)
ERYTHROCYTE [DISTWIDTH] IN BLOOD BY AUTOMATED COUNT: 12.6 % (ref 11.6–15.1)
GFR SERPL CREATININE-BSD FRML MDRD: 104 ML/MIN/1.73SQ M
GLUCOSE P FAST SERPL-MCNC: 93 MG/DL (ref 65–99)
HCT VFR BLD AUTO: 46 % (ref 36.5–49.3)
HDLC SERPL-MCNC: 38 MG/DL
HGB BLD-MCNC: 15.4 G/DL (ref 12–17)
IMM GRANULOCYTES # BLD AUTO: 0.03 THOUSAND/UL (ref 0–0.2)
IMM GRANULOCYTES NFR BLD AUTO: 0 % (ref 0–2)
LDLC SERPL CALC-MCNC: 79 MG/DL (ref 0–100)
LYMPHOCYTES # BLD AUTO: 1.47 THOUSANDS/ΜL (ref 0.6–4.47)
LYMPHOCYTES NFR BLD AUTO: 21 % (ref 14–44)
MCH RBC QN AUTO: 29.3 PG (ref 26.8–34.3)
MCHC RBC AUTO-ENTMCNC: 33.5 G/DL (ref 31.4–37.4)
MCV RBC AUTO: 88 FL (ref 82–98)
MONOCYTES # BLD AUTO: 0.42 THOUSAND/ΜL (ref 0.17–1.22)
MONOCYTES NFR BLD AUTO: 6 % (ref 4–12)
NEUTROPHILS # BLD AUTO: 4.86 THOUSANDS/ΜL (ref 1.85–7.62)
NEUTS SEG NFR BLD AUTO: 70 % (ref 43–75)
NONHDLC SERPL-MCNC: 92 MG/DL
NRBC BLD AUTO-RTO: 0 /100 WBCS
PLATELET # BLD AUTO: 261 THOUSANDS/UL (ref 149–390)
PMV BLD AUTO: 9.3 FL (ref 8.9–12.7)
POTASSIUM SERPL-SCNC: 4.2 MMOL/L (ref 3.5–5.3)
PROT SERPL-MCNC: 8.1 G/DL (ref 6.4–8.4)
RBC # BLD AUTO: 5.25 MILLION/UL (ref 3.88–5.62)
SODIUM SERPL-SCNC: 135 MMOL/L (ref 135–147)
TRIGL SERPL-MCNC: 64 MG/DL
URATE SERPL-MCNC: 6.4 MG/DL (ref 3.5–8.5)
WBC # BLD AUTO: 6.93 THOUSAND/UL (ref 4.31–10.16)

## 2022-10-05 PROCEDURE — 80053 COMPREHEN METABOLIC PANEL: CPT

## 2022-10-05 PROCEDURE — 85025 COMPLETE CBC W/AUTO DIFF WBC: CPT

## 2022-10-05 PROCEDURE — 83036 HEMOGLOBIN GLYCOSYLATED A1C: CPT

## 2022-10-05 PROCEDURE — 84550 ASSAY OF BLOOD/URIC ACID: CPT

## 2022-10-05 PROCEDURE — 80061 LIPID PANEL: CPT

## 2022-10-05 PROCEDURE — 36415 COLL VENOUS BLD VENIPUNCTURE: CPT

## 2022-10-05 RX ORDER — ATORVASTATIN CALCIUM 20 MG/1
20 TABLET, FILM COATED ORAL DAILY
Qty: 90 TABLET | Refills: 0 | Status: SHIPPED | OUTPATIENT
Start: 2022-10-05

## 2022-10-06 ENCOUNTER — HOSPITAL ENCOUNTER (EMERGENCY)
Facility: HOSPITAL | Age: 32
Discharge: HOME | End: 2022-10-06
Attending: EMERGENCY MEDICINE
Payer: MEDICARE

## 2022-10-06 ENCOUNTER — APPOINTMENT (OUTPATIENT)
Dept: RADIOLOGY | Facility: HOSPITAL | Age: 32
End: 2022-10-06
Payer: MEDICARE

## 2022-10-06 VITALS
RESPIRATION RATE: 18 BRPM | SYSTOLIC BLOOD PRESSURE: 150 MMHG | OXYGEN SATURATION: 100 % | DIASTOLIC BLOOD PRESSURE: 99 MMHG | TEMPERATURE: 97.8 F | HEART RATE: 94 BPM

## 2022-10-06 DIAGNOSIS — J06.9 URI (UPPER RESPIRATORY INFECTION): Primary | ICD-10-CM

## 2022-10-06 LAB
EST. AVERAGE GLUCOSE BLD GHB EST-MCNC: 120 MG/DL
FLUAV RNA RESP QL NAA+PROBE: NEGATIVE
FLUBV RNA RESP QL NAA+PROBE: NEGATIVE
HBA1C MFR BLD: 5.8 %
RSV RNA RESP QL NAA+PROBE: NEGATIVE
SARS-COV-2 RNA RESP QL NAA+PROBE: NEGATIVE

## 2022-10-06 PROCEDURE — 71046 X-RAY EXAM CHEST 2 VIEWS: CPT

## 2022-10-06 PROCEDURE — 0241U HB NFCT DS VIR RESP RNA 4 TRGT: CPT | Performed by: EMERGENCY MEDICINE

## 2022-10-06 PROCEDURE — 99285 EMERGENCY DEPT VISIT HI MDM: CPT | Performed by: EMERGENCY MEDICINE

## 2022-10-06 PROCEDURE — 99284 EMERGENCY DEPT VISIT MOD MDM: CPT

## 2022-10-06 NOTE — Clinical Note
[facility] was seen and treated in our emergency department on 10/6/2022  No restrictions            Diagnosis:     Que Barnes  may return to work on return date  He may return on this date: 10/07/2022         If you have any questions or concerns, please don't hesitate to call        Rosi Smith RN    ______________________________           _______________          _______________  [facility] Representative                              Date                                Time

## 2022-10-06 NOTE — ED PROVIDER NOTES
History  Chief Complaint   Patient presents with    Cold Like Symptoms     Patient stated that for the last two days he has a runny nose, sore throat, and chest congestion  Denies sick contacts  32 y/o male presents to the ED for flu like symptoms x 2 days  States that he has had cough, runny nose, and sore throat since yesterday  States that he is here to "make sure I dont have covid " he denies any known sick contacts  Denies any fever, cp, sob, urinary symptoms, or d/c  no other complaints  History provided by:  Patient  Flu Symptoms  Presenting symptoms: cough and shortness of breath    Presenting symptoms: no diarrhea, no fever, no headaches, no nausea, no sore throat and no vomiting    Severity:  Moderate  Onset quality:  Sudden  Duration:  2 days  Progression:  Worsening  Chronicity:  New  Relieved by:  None tried  Worsened by:  Nothing  Ineffective treatments:  None tried  Associated symptoms: nasal congestion    Associated symptoms: no chills, no ear pain and no neck stiffness    Risk factors: no sick contacts        Prior to Admission Medications   Prescriptions Last Dose Informant Patient Reported? Taking?    OLANZapine (ZyPREXA) 15 mg tablet  Self Yes No   Sig: Take 15 mg by mouth daily at bedtime    atorvastatin (LIPITOR) 20 mg tablet   No No   Sig: Take 1 tablet (20 mg total) by mouth daily   clotrimazole (LOTRIMIN) 1 % cream  Self Yes No   Sig: APPLY TO AFFECTED AREA EVERY DAY   desonide (DESOWEN) 0 05 % cream  Self No No   Sig: Apply to affected areas on face 2 times a day as needed   Patient not taking: Reported on 1/15/2022    hydrocortisone (ANUSOL-HC) 25 mg suppository  Self No No   Sig: Insert 1 suppository (25 mg total) into the rectum 2 (two) times a day   Patient not taking: Reported on 1/15/2022    hydrocortisone 2 5 % cream  Self No No   Sig: Apply to affected areas on face twice daily as needed   Patient not taking: Reported on 1/15/2022    multivitamin (THERAGRAN) TABS  Self Yes No   Sig: Take 1 tablet by mouth daily   ranitidine (ZANTAC) 150 mg tablet  Self No No   Sig: Take 1 tablet (150 mg total) by mouth 2 (two) times a day   Patient not taking: Reported on 9/21/2021      Facility-Administered Medications: None       Past Medical History:   Diagnosis Date    GERD (gastroesophageal reflux disease)     H  pylori infection     Hematochezia     Hypercholesteremia     Hyperlipidemia     Psychosis ([address] )     Last Assessed:  11/19/15    Schizophrenia [facility])        Past Surgical History:   Procedure Laterality Date    COLONOSCOPY      CT COLONOSCOPY FLX DX W/COLLJ SPEC WHEN PFRMD N/A 9/27/2017    Procedure: COLONOSCOPY;  Surgeon: Bud Reyes MD;  Location: [facility]; Service: Gastroenterology       Family History   Problem Relation Age of Onset    Hypertension Family     Cancer Family      I have reviewed and agree with the history as documented  E-Cigarette/Vaping    E-Cigarette Use Never User      E-Cigarette/Vaping Substances    Nicotine No     THC No     CBD No     Flavoring No     Other No     Unknown No      Social History     Tobacco Use    Smoking status: Current Some Day Smoker     Packs/day: 0 50     Types: Cigarettes    Smokeless tobacco: Never Used    Tobacco comment: 2-3 cigarettes a day   Vaping Use    Vaping Use: Never used   Substance Use Topics    Alcohol use: Yes     Comment: occ    Drug use: No       Review of Systems   Constitutional: Negative for chills and fever  HENT: Positive for congestion  Negative for ear pain and sore throat  Eyes: Negative for pain and visual disturbance  Respiratory: Positive for cough and shortness of breath  Negative for wheezing  Cardiovascular: Negative for chest pain and leg swelling  Gastrointestinal: Negative for abdominal pain, diarrhea, nausea and vomiting  Genitourinary: Negative for dysuria, frequency, hematuria and urgency  Musculoskeletal: Negative for neck pain and neck stiffness  Skin: Negative for rash and wound  Neurological: Negative for weakness, numbness and headaches  Psychiatric/Behavioral: Negative for agitation and confusion  All other systems reviewed and are negative  Physical Exam  Physical Exam  Vitals and nursing note reviewed  Constitutional:       Appearance: He is well-developed  HENT:      Head: Normocephalic and atraumatic  Eyes:      Pupils: Pupils are equal, round, and reactive to light  Cardiovascular:      Rate and Rhythm: Normal rate and regular rhythm  Pulmonary:      Effort: Pulmonary effort is normal       Breath sounds: Normal breath sounds  Abdominal:      General: Bowel sounds are normal       Palpations: Abdomen is soft  Tenderness: There is no abdominal tenderness  Musculoskeletal:         General: Normal range of motion  Cervical back: Normal range of motion and neck supple  Skin:     General: Skin is warm and dry  Neurological:      General: No focal deficit present  Mental Status: He is alert and oriented to person, place, and time        Comments: No focal deficits         Vital Signs  ED Triage Vitals [10/06/22 1105]   Temperature Pulse Respirations Blood Pressure SpO2   97 8 °F (36 6 °C) 94 18 150/99 100 %      Temp Source Heart Rate Source Patient Position - Orthostatic VS BP Location FiO2 (%)   Oral Monitor -- Left arm --      Pain Score       5           Vitals:    10/06/22 1105   BP: 150/99   Pulse: 94         Visual Acuity      ED Medications  Medications - No data to display    Diagnostic Studies  Results Reviewed     Procedure Component Value Units Date/Time    FLU/RSV/COVID - if FLU/RSV clinically relevant [[id number]]  (Normal) Collected: 10/06/22 1105    Lab Status: Final result Specimen: Nares from Nose Updated: 10/06/22 1158     SARS-CoV-2 Negative     INFLUENZA A PCR Negative     INFLUENZA B PCR Negative     RSV PCR Negative    Narrative:      FOR PEDIATRIC PATIENTS - copy/paste COVID Guidelines URL to browser: [email or web address]    SARS-CoV-2 assay is a Nucleic Acid Amplification assay intended for the  qualitative detection of nucleic acid from SARS-CoV-2 in nasopharyngeal  swabs  Results are for the presumptive identification of SARS-CoV-2 RNA  Positive results are indicative of infection with SARS-CoV-2, the virus  causing COVID-19, but do not rule out bacterial infection or co-infection  with other viruses  Laboratories within the [place] and its  territories are required to report all positive results to the appropriate  public health authorities  Negative results do not preclude SARS-CoV-2  infection and should not be used as the sole basis for treatment or other  patient management decisions  Negative results must be combined with  clinical observations, patient history, and epidemiological information  This test has not been FDA cleared or approved  This test has been authorized by FDA under an Emergency Use Authorization  (EUA)  This test is only authorized for the duration of time the  declaration that circumstances exist justifying the authorization of the  emergency use of an in vitro diagnostic tests for detection of SARS-CoV-2  virus and/or diagnosis of COVID-19 infection under section 564(b)(1) of  the Act, 21 U  S C  610DUT-0(K)(1), unless the authorization is terminated  or revoked sooner  The test has been validated but independent review by FDA  and CLIA is pending  Test performed using [employer] Gene[employer]pert: This RT-PCR assay targets N2,  a region unique to SARS-CoV-2  A conserved region in the E-gene was chosen  for pan-Sarbecovirus detection which includes SARS-CoV-2  According to CMS-2020-01-R, this platform meets the definition of high-throughput technology                   XR chest 2 views   ED Interpretation by Randy French DO (10/06 1311)   NAP       Final Result by Ginny Mar MD (10/06 1326) No acute cardiopulmonary disease  Workstation performed: [id number]                    Procedures  Procedures         ED Course                               SBIRT 22yo+    Flowsheet Row Most Recent Value   SBIRT (23 yo +)    In order to provide better care to our patients, we are screening all of our patients for alcohol and drug use  Would it be okay to ask you these screening questions? Yes Filed at: 10/06/2022 1152   Initial Alcohol Screen: US AUDIT-C     1  How often do you have a drink containing alcohol? 0 Filed at: 10/06/2022 1152   2  How many drinks containing alcohol do you have on a typical day you are drinking? 0 Filed at: 10/06/2022 1152   3a  Male UNDER 65: How often do you have five or more drinks on one occasion? 0 Filed at: 10/06/2022 1152   Audit-C Score 0 Filed at: 10/06/2022 1152   GUMARO: How many times in the past year have you    Used an illegal drug or used a prescription medication for non-medical reasons? Never Filed at: 10/06/2022 1152                    MDM  Number of Diagnoses or Management Options  URI (upper respiratory infection): new and requires workup  Diagnosis management comments: Patient with URI symptoms- will get covid testing, cxr, and reassess  Patient reevaluated and feels improved  Patient updated on results of tests  Discharge instructions given including medications, follow-up, and return precautions  Patient demonstrates verbal understanding and agrees with plan         Amount and/or Complexity of Data Reviewed  Clinical lab tests: ordered and reviewed  Tests in the radiology section of CPT®: reviewed and ordered  Tests in the medicine section of CPT®: ordered and reviewed  Discussion of test results with the performing providers: yes  Decide to obtain previous medical records or to obtain history from someone other than the patient: yes  Obtain history from someone other than the patient: yes  Review and summarize past medical records: yes  Discuss the patient with other providers: yes  Independent visualization of images, tracings, or specimens: yes    Patient Progress  Patient progress: improved      Disposition  Final diagnoses:   URI (upper respiratory infection)     Time reflects when diagnosis was documented in both MDM as applicable and the Disposition within this note     Time User Action Codes Description Comment    10/6/2022  1:16 PM Margarita DAVIDSON Add [J06 9] URI (upper respiratory infection)       ED Disposition     ED Disposition   Discharge    Condition   Stable    Date/Time   Thu Oct 6, 2022  1:12 PM    Comment   Charlie Berrios discharge to home/self care                 Follow-up Information     Follow up With Specialties Details Why Contact Info Additional Information    Srikanth Gutierrez MD Internal Medicine, Hospice Services, Palliative Care Call in 1 day As needed [address], [place] [address] Emergency Department Emergency Medicine Go to  Immediately for any new or worsening symptoms [facility] [place] [address] [place] [address] Emergency Department, [address] [place] [address], [facility]          Discharge Medication List as of 10/6/2022  1:21 PM      CONTINUE these medications which have NOT CHANGED    Details   atorvastatin (LIPITOR) 20 mg tablet Take 1 tablet (20 mg total) by mouth daily, Starting Wed 10/5/2022, Normal      clotrimazole (LOTRIMIN) 1 % cream APPLY TO AFFECTED AREA EVERY DAY, Historical Med      desonide (DESOWEN) 0 05 % cream Apply to affected areas on face 2 times a day as needed, Normal      hydrocortisone (ANUSOL-HC) 25 mg suppository Insert 1 suppository (25 mg total) into the rectum 2 (two) times a day, Starting Thu 1/10/2019, Normal      hydrocortisone 2 5 % cream Apply to affected areas on face twice daily as needed, Normal      multivitamin [facility]) TABS Take 1 tablet by mouth daily, Historical Med      OLANZapine (ZyPREXA) 15 mg tablet Take 15 mg by mouth daily at bedtime , Historical Med      ranitidine (ZANTAC) 150 mg tablet Take 1 tablet (150 mg total) by mouth 2 (two) times a day, Starting Thu 5/17/2018, Normal             No discharge procedures on file      PDMP Review     None          ED Provider  Electronically Signed by           Reddy Hoang DO  10/06/22 1600

## 2022-10-07 ENCOUNTER — RA CDI HCC (OUTPATIENT)
Dept: OTHER | Facility: HOSPITAL | Age: 32
End: 2022-10-07

## 2022-10-07 NOTE — PROGRESS NOTES
[address]  coding opportunities       Chart reviewed, no opportunity found: CHART REVIEWED, NO OPPORTUNITY FOUND        Patients Insurance     Medicare Insurance: Medicare

## 2022-10-13 ENCOUNTER — OFFICE VISIT (OUTPATIENT)
Dept: INTERNAL MEDICINE CLINIC | Facility: CLINIC | Age: 32
End: 2022-10-13
Payer: MEDICARE

## 2022-10-13 VITALS
HEART RATE: 86 BPM | DIASTOLIC BLOOD PRESSURE: 74 MMHG | TEMPERATURE: 99.4 F | RESPIRATION RATE: 18 BRPM | SYSTOLIC BLOOD PRESSURE: 130 MMHG | OXYGEN SATURATION: 98 % | HEIGHT: 68 IN | WEIGHT: 228 LBS | BODY MASS INDEX: 34.56 KG/M2

## 2022-10-13 DIAGNOSIS — F20.9 SCHIZOPHRENIA, UNSPECIFIED TYPE (HCC): ICD-10-CM

## 2022-10-13 DIAGNOSIS — K76.0 NON-ALCOHOLIC FATTY LIVER DISEASE: ICD-10-CM

## 2022-10-13 DIAGNOSIS — E78.5 HYPERLIPIDEMIA, UNSPECIFIED HYPERLIPIDEMIA TYPE: ICD-10-CM

## 2022-10-13 DIAGNOSIS — K21.9 GASTROESOPHAGEAL REFLUX DISEASE WITHOUT ESOPHAGITIS: ICD-10-CM

## 2022-10-13 DIAGNOSIS — Z86.010 PERSONAL HISTORY OF COLONIC POLYPS: ICD-10-CM

## 2022-10-13 DIAGNOSIS — R73.01 IMPAIRED FASTING GLUCOSE: ICD-10-CM

## 2022-10-13 DIAGNOSIS — F17.200 SMOKING: ICD-10-CM

## 2022-10-13 DIAGNOSIS — G47.33 OSA (OBSTRUCTIVE SLEEP APNEA): ICD-10-CM

## 2022-10-13 DIAGNOSIS — Z23 ENCOUNTER FOR IMMUNIZATION: ICD-10-CM

## 2022-10-13 DIAGNOSIS — J06.9 VIRAL UPPER RESPIRATORY TRACT INFECTION: Primary | ICD-10-CM

## 2022-10-13 PROCEDURE — G0008 ADMIN INFLUENZA VIRUS VAC: HCPCS | Performed by: INTERNAL MEDICINE

## 2022-10-13 PROCEDURE — 90686 IIV4 VACC NO PRSV 0.5 ML IM: CPT | Performed by: INTERNAL MEDICINE

## 2022-10-13 PROCEDURE — 99213 OFFICE O/P EST LOW 20 MIN: CPT | Performed by: INTERNAL MEDICINE

## 2022-10-13 RX ORDER — NICOTINE 21 MG/24HR
1 PATCH, TRANSDERMAL 24 HOURS TRANSDERMAL EVERY 24 HOURS
Qty: 28 PATCH | Refills: 0 | Status: SHIPPED | OUTPATIENT
Start: 2022-10-13

## 2022-10-13 NOTE — PROGRESS NOTES
Assessment/Plan:    1  ARELI  -did not get his CPAP supplies  -will order repeat CPAP with DME today  -was advised to use CPAP for at least 1 month daily and follow-up with us after that  2  Gastric reflux  -under control  -not on any medication  -continue working on diet and exercise    3  Current smoker  -as smoking around 1/2 pack a day  -agrees for nicotine patch which is given today  -encouraged to avoid smoking    4  Colonic polyp history  -had colonoscopy done in past which shows polyp and was advised to repeat colonoscopy in 5 years which he is due for that   -referral to colonoscopy given  5  Anson Cowden  -liver enzymes WNL  -will continue to monitor  6  Impaired fasting sugar  -A1c stable at around 5 8  -repeat A1c in 6 months  -continue to work on diet and exercise    7  Schizophrenia  -stable with Zyprexa  -continue following up with Psychiatry  8  Upper respiratory tract symptoms  -currently having only intermittent cough in the morning otherwise doing well  All his symptoms had resolved  -recommended to continue symptomatic management with saline gargles, steam inhalation if symptoms continues to worsen      9  Health maintenance  -will get flu shot today        Problem List Items Addressed This Visit        Digestive    Gastroesophageal reflux disease    Non-alcoholic fatty liver disease       Endocrine    Impaired fasting glucose    Relevant Orders    CBC and differential    Comprehensive metabolic panel    Lipid panel    HEMOGLOBIN A1C W/ EAG ESTIMATION    TSH, 3rd generation with Free T4 reflex       Respiratory    ARELI (obstructive sleep apnea)    Relevant Orders    CPAP Auto New DME       Other    Hyperlipidemia    Relevant Orders    CBC and differential    Comprehensive metabolic panel    Lipid panel    HEMOGLOBIN A1C W/ EAG ESTIMATION    TSH, 3rd generation with Free T4 reflex    Schizophrenia (HCC)    Relevant Medications    nicotine (NICODERM CQ) 14 mg/24hr TD 24 hr patch      Other Visit Diagnoses     Viral upper respiratory tract infection    -  Primary    Encounter for immunization        Personal history of colonic polyps        Relevant Orders    Ambulatory referral for Colonoscopy    Smoking        Relevant Medications    nicotine (NICODERM CQ) 14 mg/24hr TD 24 hr patch            Subjective:      Patient ID: Liza Chirinos is a 33 y o  male  Pt is here for follow-up visit  Doing well  Did not get CPAP supplies after last visit  For unknown reason  Doing well with reflux  Has been off medication  Working on diet  Still avoids fast food however he continues to be dominoes  Is doing regular exercise  BP seems to be under control  Cholesterol and LDL has been improved since last visit  Denies any other complaints  Was seen in ED for flu like illness however has been significant improvement noted since last 1 week  Still continues to have intermittent cough with mucus only worse in the morning otherwise good throughout the day  Still continues to have some chest congestion  Denies any rhinorrhea, fever, nasal congestion, sinus pain, SOB, chest pain, N/V  Did not follow-up with GI for repeat colonoscopy due to H/o colonic polyp noted 5 years ago  The following portions of the patient's history were reviewed and updated as appropriate:       Review of Systems:    Review of Systems   Constitutional: Negative for activity change, appetite change, chills, diaphoresis, fatigue, fever and unexpected weight change  HENT: Positive for congestion  Negative for drooling, ear discharge, ear pain, facial swelling, hearing loss, mouth sores, nosebleeds, postnasal drip, rhinorrhea, sinus pressure, sinus pain, sneezing, sore throat and tinnitus  Eyes: Negative for visual disturbance  Respiratory: Negative for cough, chest tightness and shortness of breath  Cardiovascular: Negative for chest pain, palpitations and leg swelling     Gastrointestinal: Negative for abdominal distention, abdominal pain, blood in stool, constipation, diarrhea, nausea and vomiting  Genitourinary: Negative for difficulty urinating  Musculoskeletal: Negative for arthralgias  Neurological: Negative for dizziness, syncope, facial asymmetry, speech difficulty, light-headedness, numbness and headaches  Psychiatric/Behavioral: Negative for behavioral problems and sleep disturbance  Past Medical and Surgical History:     Past Medical History:   Diagnosis Date   • GERD (gastroesophageal reflux disease)    • H  pylori infection    • Hematochezia    • Hypercholesteremia    • Hyperlipidemia    • Psychosis ([address] )     Last Assessed:  11/19/15   • Schizophrenia [facility])        Past Surgical History:   Procedure Laterality Date   • COLONOSCOPY     • OR COLONOSCOPY FLX DX W/COLLJ SPEC WHEN PFRMD N/A 9/27/2017    Procedure: COLONOSCOPY;  Surgeon: Rosemary Worley MD;  Location: [facility]; Service: Gastroenterology         Family History:    Family History   Problem Relation Age of Onset   • Hypertension Family    • Cancer Family           Social History:    Substance Use History:   Social History     Substance and Sexual Activity   Alcohol Use Yes    Comment: occ     Social History     Tobacco Use   Smoking Status Current Some Day Smoker   • Packs/day: 0 25   • Types: Cigarettes   Smokeless Tobacco Never Used   Tobacco Comment    2-3 cigarettes a day     Social History     Substance and Sexual Activity   Drug Use No         Meds/Allergies:    Prior to Admission medications    Medication Sig Start Date End Date Taking?  Authorizing Provider   atorvastatin (LIPITOR) 20 mg tablet Take 1 tablet (20 mg total) by mouth daily 10/5/22  Yes Vic Talbert MD   multivitamin [facility]) TABS Take 1 tablet by mouth daily   Yes Historical Provider, MD   OLANZapine (ZyPREXA) 15 mg tablet Take 15 mg by mouth daily at bedtime    Yes Historical Provider, MD   clotrimazole (LOTRIMIN) 1 % cream APPLY TO AFFECTED AREA EVERY DAY 12/27/18 Historical Provider, MD   desonide (DESOWEN) 0 05 % cream Apply to affected areas on face 2 times a day as needed 3/6/19   Melinda Chanel MD   hydrocortisone (ANUSOL-HC) 25 mg suppository Insert 1 suppository (25 mg total) into the rectum 2 (two) times a day 1/10/19   Melinda Chanel MD   hydrocortisone 2 5 % cream Apply to affected areas on face twice daily as needed 4/8/19   Melinda Chanel MD   ranitidine (ZANTAC) 150 mg tablet Take 1 tablet (150 mg total) by mouth 2 (two) times a day 5/17/18   Danbury Fillers, CRNP       Allergies: No Known Allergies    Objective:  Vitals:    10/13/22 1120   BP: 130/74   BP Location: Left arm   Patient Position: Sitting   Cuff Size: Large   Pulse: 86   Resp: 18   Temp: 99 4 °F (37 4 °C)   TempSrc: Tympanic   SpO2: 98%   Weight: 103 kg (228 lb)   Height: 5' 8" (1 727 m)     Body mass index is 34 67 kg/m²  Physical Exam  Vitals reviewed  Constitutional:       General: He is not in acute distress  Appearance: He is well-developed  HENT:      Head: Normocephalic and atraumatic  Mouth/Throat:      Mouth: Mucous membranes are moist       Pharynx: Oropharynx is clear  No oropharyngeal exudate or posterior oropharyngeal erythema  Eyes:      General: No scleral icterus  Right eye: No discharge  Left eye: No discharge  Cardiovascular:      Rate and Rhythm: Normal rate and regular rhythm  Pulses: Normal pulses  Heart sounds: Normal heart sounds  Pulmonary:      Effort: Pulmonary effort is normal  No respiratory distress  Breath sounds: Normal breath sounds  No wheezing or rales  Chest:      Chest wall: No tenderness  Abdominal:      General: Bowel sounds are normal  There is no distension  Palpations: Abdomen is soft  Tenderness: There is no abdominal tenderness  Musculoskeletal:         General: No swelling or tenderness  Normal range of motion  Cervical back: Normal range of motion  Right lower leg: No edema  Left lower leg: No edema  Skin:     General: Skin is warm  Coloration: Skin is not pale  Neurological:      General: No focal deficit present  Mental Status: He is alert and oriented to person, place, and time  Mental status is at baseline  Cranial Nerves: No cranial nerve deficit  Sensory: No sensory deficit  Motor: No weakness  Psychiatric:         Mood and Affect: Mood normal          Behavior: Behavior normal        BMI Counseling: Body mass index is 34 67 kg/m²  The BMI is above normal  Nutrition recommendations include reducing portion sizes, decreasing overall calorie intake, 3-5 servings of fruits/vegetables daily, reducing fast food intake, consuming healthier snacks, decreasing soda and/or juice intake, moderation in carbohydrate intake, increasing intake of lean protein, reducing intake of saturated fat and trans fat and reducing intake of cholesterol  Exercise recommendations include moderate aerobic physical activity for 150 minutes/week, vigorous aerobic physical activity for 75 minutes/week and exercising 3-5 times per week

## 2022-10-13 NOTE — PATIENT INSTRUCTIONS
CPAP regularly    Our call [phone] for your CPAP supply    Repeat labs in 6 months    follow-up after 1 month of CPAP use

## 2022-10-26 ENCOUNTER — HOSPITAL ENCOUNTER (EMERGENCY)
Facility: HOSPITAL | Age: 32
Discharge: HOME | End: 2022-10-26
Attending: EMERGENCY MEDICINE
Payer: MEDICARE

## 2022-10-26 ENCOUNTER — APPOINTMENT (EMERGENCY)
Dept: RADIOLOGY | Facility: HOSPITAL | Age: 32
End: 2022-10-26
Payer: MEDICARE

## 2022-10-26 VITALS
DIASTOLIC BLOOD PRESSURE: 79 MMHG | OXYGEN SATURATION: 99 % | RESPIRATION RATE: 18 BRPM | TEMPERATURE: 99.2 F | HEART RATE: 99 BPM | SYSTOLIC BLOOD PRESSURE: 140 MMHG

## 2022-10-26 DIAGNOSIS — R05.9 COUGH: Primary | ICD-10-CM

## 2022-10-26 LAB
FLUAV RNA RESP QL NAA+PROBE: NEGATIVE
FLUBV RNA RESP QL NAA+PROBE: NEGATIVE
RSV RNA RESP QL NAA+PROBE: NEGATIVE
SARS-COV-2 RNA RESP QL NAA+PROBE: NEGATIVE

## 2022-10-26 PROCEDURE — 71046 X-RAY EXAM CHEST 2 VIEWS: CPT

## 2022-10-26 PROCEDURE — 0241U HB NFCT DS VIR RESP RNA 4 TRGT: CPT | Performed by: EMERGENCY MEDICINE

## 2022-10-26 PROCEDURE — 99284 EMERGENCY DEPT VISIT MOD MDM: CPT | Performed by: EMERGENCY MEDICINE

## 2022-10-26 NOTE — Clinical Note
Sami Don was seen and treated in our emergency department on 10/26/2022  No restrictions            Diagnosis:     Ora Mccauley  may return to school on return date  He may return on this date: 10/28/2022    Patient tested negative for COVID, influenza, and RSV virus is on 10/26/2022  If you have any questions or concerns, please don't hesitate to call        Jennifer Acosta, DO    ______________________________           _______________          _______________  Hospital Representative                              Date                                Time

## 2022-10-26 NOTE — ED PROVIDER NOTES
History  Chief Complaint   Patient presents with   • Cough     Pt states he has a cough  Mother has covid and his job wants him checked for covid  Patient is a 30-year-old male with past medical history of GERD and schizophrenia, presents to the emergency department for 2 weeks of cough and congestion  Patient states that 2 weeks ago he started with a cough and chest congestion as well as mild nasal congestion  He states the chest and nasal congestion has resolved but he continues to have a dry cough  He denies any associated fevers or shaking chills, headaches, dizziness or near syncope, hemoptysis, palpitations, chest pain, dyspnea, abdominal pain, nausea, vomiting, diarrhea, constipation, urinary symptoms, skin rash or color change, leg pain or swelling, extremity weakness or paresthesia or other focal neurologic deficits  He states his mother started to get sick a few days ago after his symptoms started and she just had a positive COVID test yesterday  Patient's employer requested he come to the ED for testing  Patient admits to smoking  Denies history of asthma or COPD  History provided by:  Patient   used: No        Prior to Admission Medications   Prescriptions Last Dose Informant Patient Reported? Taking?    OLANZapine (ZyPREXA) 15 mg tablet  Self Yes No   Sig: Take 15 mg by mouth daily at bedtime    atorvastatin (LIPITOR) 20 mg tablet   No No   Sig: Take 1 tablet (20 mg total) by mouth daily   multivitamin (THERAGRAN) TABS  Self Yes No   Sig: Take 1 tablet by mouth daily   nicotine (NICODERM CQ) 14 mg/24hr TD 24 hr patch   No No   Sig: Place 1 patch on the skin every 24 hours      Facility-Administered Medications: None       Past Medical History:   Diagnosis Date   • GERD (gastroesophageal reflux disease)    • H  pylori infection    • Hematochezia    • Hypercholesteremia    • Hyperlipidemia    • Psychosis ([facility] )     Last Assessed:  11/19/15   • Schizophrenia ([facility] ) Past Surgical History:   Procedure Laterality Date   • COLONOSCOPY     • AR COLONOSCOPY FLX DX W/COLLJ SPEC WHEN PFRMD N/A 9/27/2017    Procedure: COLONOSCOPY;  Surgeon: Vivienne Nevarez MD;  Location: [facility]; Service: Gastroenterology       Family History   Problem Relation Age of Onset   • Hypertension Family    • Cancer Family      I have reviewed and agree with the history as documented  E-Cigarette/Vaping   • E-Cigarette Use Never User      E-Cigarette/Vaping Substances   • Nicotine No    • THC No    • CBD No    • Flavoring No    • Other No    • Unknown No      Social History     Tobacco Use   • Smoking status: Current Some Day Smoker     Packs/day: 0 25     Types: Cigarettes   • Smokeless tobacco: Never Used   • Tobacco comment: 2-3 cigarettes a day   Vaping Use   • Vaping Use: Never used   Substance Use Topics   • Alcohol use: Yes     Comment: occ   • Drug use: No       Review of Systems   Constitutional: Negative for chills and fever  HENT: Negative for congestion, ear pain, rhinorrhea and sore throat  Respiratory: Positive for cough  Negative for chest tightness, shortness of breath and wheezing  Cardiovascular: Negative for chest pain and palpitations  Gastrointestinal: Negative for abdominal pain, constipation, diarrhea, nausea and vomiting  Genitourinary: Negative for dysuria, flank pain, frequency and hematuria  Musculoskeletal: Negative for back pain, neck pain and neck stiffness  Skin: Negative for color change, pallor, rash and wound  Allergic/Immunologic: Negative for immunocompromised state  Neurological: Negative for dizziness, weakness, light-headedness, numbness and headaches  Hematological: Negative for adenopathy  Psychiatric/Behavioral: Negative for confusion and decreased concentration  All other systems reviewed and are negative  Physical Exam  Physical Exam  Vitals and nursing note reviewed     Constitutional:       General: He is not in acute distress  Appearance: Normal appearance  He is well-developed  He is not ill-appearing, toxic-appearing or diaphoretic  HENT:      Head: Normocephalic and atraumatic  Right Ear: External ear normal       Left Ear: External ear normal       Nose: Nose normal       Mouth/Throat:      Comments: Orpharyngeal exam deferred at this time due to risk of exposure to COVID-19 during current pandemic  Patient has no oropharyngeal complaints  Eyes:      Extraocular Movements: Extraocular movements intact  Conjunctiva/sclera: Conjunctivae normal    Neck:      Vascular: No JVD  Cardiovascular:      Rate and Rhythm: Normal rate and regular rhythm  Pulses: Normal pulses  Heart sounds: Normal heart sounds  No murmur heard  No friction rub  No gallop  Pulmonary:      Effort: Pulmonary effort is normal  No respiratory distress  Breath sounds: Normal breath sounds  No wheezing, rhonchi or rales  Abdominal:      General: There is no distension  Palpations: Abdomen is soft  Tenderness: There is no abdominal tenderness  There is no guarding or rebound  Musculoskeletal:         General: No swelling or tenderness  Normal range of motion  Cervical back: Normal range of motion and neck supple  No rigidity  Skin:     General: Skin is warm and dry  Coloration: Skin is not pale  Findings: No erythema or rash  Neurological:      General: No focal deficit present  Mental Status: He is alert and oriented to person, place, and time  Sensory: No sensory deficit  Motor: No weakness     Psychiatric:         Mood and Affect: Mood normal          Behavior: Behavior normal          Vital Signs  ED Triage Vitals [10/26/22 1208]   Temperature Pulse Respirations Blood Pressure SpO2   99 2 °F (37 3 °C) 99 18 140/79 99 %      Temp src Heart Rate Source Patient Position - Orthostatic VS BP Location FiO2 (%)   -- -- -- -- --      Pain Score       --           Vitals: 10/26/22 1208   BP: 140/79   Pulse: 99         Visual Acuity      ED Medications  Medications - No data to display    Diagnostic Studies  Results Reviewed     Procedure Component Value Units Date/Time    FLU/RSV/COVID - if FLU/RSV clinically relevant [[id number]]  (Normal) Collected: 10/26/22 1210    Lab Status: Final result Specimen: Nares from Nose Updated: 10/26/22 1253     SARS-CoV-2 Negative     INFLUENZA A PCR Negative     INFLUENZA B PCR Negative     RSV PCR Negative    Narrative:      FOR PEDIATRIC PATIENTS - copy/paste COVID Guidelines URL to browser: [email or web address]  Instant Opinion    SARS-CoV-2 assay is a Nucleic Acid Amplification assay intended for the  qualitative detection of nucleic acid from SARS-CoV-2 in nasopharyngeal  swabs  Results are for the presumptive identification of SARS-CoV-2 RNA  Positive results are indicative of infection with SARS-CoV-2, the virus  causing COVID-19, but do not rule out bacterial infection or co-infection  with other viruses  Laboratories within the [place] and its  territories are required to report all positive results to the appropriate  public health authorities  Negative results do not preclude SARS-CoV-2  infection and should not be used as the sole basis for treatment or other  patient management decisions  Negative results must be combined with  clinical observations, patient history, and epidemiological information  This test has not been FDA cleared or approved  This test has been authorized by FDA under an Emergency Use Authorization  (EUA)  This test is only authorized for the duration of time the  declaration that circumstances exist justifying the authorization of the  emergency use of an in vitro diagnostic tests for detection of SARS-CoV-2  virus and/or diagnosis of COVID-19 infection under section 564(b)(1) of  the Act, 21 U  S C  247DOC-2(L)(5), unless the authorization is terminated  or revoked sooner  The test has been validated but independent review by FDA  and CLIA is pending  Test performed using [employer] GeneXpert: This RT-PCR assay targets N2,  a region unique to SARS-CoV-2  A conserved region in the E-gene was chosen  for pan-Sarbecovirus detection which includes SARS-CoV-2  According to CMS-2020-01-R, this platform meets the definition of high-throughput technology  XR chest 2 views   ED Interpretation by Francisco Baugh DO (10/26 1311)   No acute abnormality in the chest       Final Result by Josiane Marino MD (10/26 1329)      No acute cardiopulmonary disease  Workstation performed: [id number]                    Procedures  Procedures         ED Course                 SBIRT 22yo+    Flowsheet Row Most Recent Value   SBIRT (25 yo +)    In order to provide better care to our patients, we are screening all of our patients for alcohol and drug use  Would it be okay to ask you these screening questions? No Filed at: 10/26/2022 1329                    MDM  Number of Diagnoses or Management Options  Cough  Diagnosis management comments: 42-year-old male presents to the ED for 2 weeks of cough and chest congestion, now just coughing  Patient denies any dyspnea, chest pain, fevers  Patient's mother recently tested positive for COVID and patient's workplace requested test   COVID/flu/RSV swab was obtained in triage and still pending  Will obtain chest x-ray to assess for pneumonia given 2 weeks duration of cough  Most likely patient has viral bronchitis  Updated patient prior to discharge about negative COVID/flu/RSV test and negative chest x-ray  Discussed symptomatic management at home and as long as patient remains afebrile, he may return to work  I did discuss the possibility of false negative test and that patient may still get COVID given his mother has [employer]  Discussed ED return parameters         Amount and/or Complexity of Data Reviewed  Clinical lab tests: ordered and reviewed  Tests in the radiology section of CPT®: ordered and reviewed  Independent visualization of images, tracings, or specimens: yes        Disposition  Final diagnoses:   Cough     Time reflects when diagnosis was documented in both MDM as applicable and the Disposition within this note     Time User Action Codes Description Comment    10/26/2022  1:48 PM Jose Manuel Lazo [R05 9] Cough       ED Disposition     ED Disposition   Discharge    Condition   Stable    Date/Time   Wed Oct 26, 2022  1:48 PM    Comment   Qing Acuna discharge to home/self care  Follow-up Information     Follow up With Specialties Details Why Contact Info Additional Information    Mart Schaefer MD Internal Medicine, Hospice Services, Palliative Care Schedule an appointment as soon as possible for a visit  As needed [facility], [address] Emergency Department Emergency Medicine Go to  If symptoms worsen [facility] Emergency Department, [address] [place] [address], [facility]          Discharge Medication List as of 10/26/2022  1:49 PM      CONTINUE these medications which have NOT CHANGED    Details   atorvastatin (LIPITOR) 20 mg tablet Take 1 tablet (20 mg total) by mouth daily, Starting Wed 10/5/2022, Normal      multivitamin (THERAGRAN) TABS Take 1 tablet by mouth daily, Historical Med      nicotine (NICODERM CQ) 14 mg/24hr TD 24 hr patch Place 1 patch on the skin every 24 hours, Starting Thu 10/13/2022, Normal      OLANZapine (ZyPREXA) 15 mg tablet Take 15 mg by mouth daily at bedtime , Historical Med             No discharge procedures on file      PDMP Review     None          ED Provider  Electronically Signed by           Bruno Dyson DO  10/26/22 4818

## 2022-12-06 ENCOUNTER — TELEPHONE (OUTPATIENT)
Dept: INTERNAL MEDICINE CLINIC | Facility: CLINIC | Age: 32
End: 2022-12-06

## 2022-12-29 ENCOUNTER — OFFICE VISIT (OUTPATIENT)
Dept: URGENT CARE | Facility: CLINIC | Age: 32
End: 2022-12-29

## 2022-12-29 VITALS
HEART RATE: 85 BPM | OXYGEN SATURATION: 98 % | RESPIRATION RATE: 18 BRPM | SYSTOLIC BLOOD PRESSURE: 134 MMHG | TEMPERATURE: 97.7 F | DIASTOLIC BLOOD PRESSURE: 89 MMHG

## 2022-12-29 DIAGNOSIS — K04.7 DENTAL INFECTION: Primary | ICD-10-CM

## 2022-12-29 RX ORDER — AMOXICILLIN AND CLAVULANATE POTASSIUM 875; 125 MG/1; MG/1
1 TABLET, FILM COATED ORAL EVERY 12 HOURS SCHEDULED
Qty: 14 TABLET | Refills: 0 | Status: SHIPPED | OUTPATIENT
Start: 2022-12-29 | End: 2023-01-05

## 2022-12-29 NOTE — PROGRESS NOTES
[address] [email or web address] [facility]        NAME: Gurvinder Cloud is a 33 y o  male  : 1990    MRN: [id number]  DATE: 2022  TIME: 4:59 PM    Assessment and Plan   Dental infection [K04 7]  1  Dental infection  amoxicillin-clavulanate (AUGMENTIN) 875-125 mg per tablet            Patient Instructions     Take Augmentin with food as prescribed  Tylenol/motrin as needed for pain  Salt water gargles  Ice over area  Follow up with PCP  Follow up with dentist     Chief Complaint     Chief Complaint   Patient presents with   • Dental Pain     Pt c/o right lower dental pain that has been going on for months         History of Present Illness       The patient presents today with complaints of R lower dental pain and swelling that has been going on for about a month  He states the pain and swelling have increased over the past few days  He denies fever/chills, or drainage in his mouth  He has not been to a dentist in a few years, and does not have any upcoming appointments with one scheduled  Review of Systems   Review of Systems   Constitutional: Negative for chills, fatigue and fever  HENT: Positive for dental problem (R lower)  Negative for congestion and trouble swallowing  Eyes: Negative  Respiratory: Negative for cough and shortness of breath  Cardiovascular: Negative for chest pain and palpitations  Gastrointestinal: Negative for abdominal pain, diarrhea, nausea and vomiting  Genitourinary: Negative for difficulty urinating  Musculoskeletal: Negative for myalgias  Skin: Negative for rash  Allergic/Immunologic: Negative for environmental allergies  Neurological: Negative for dizziness and headaches  Psychiatric/Behavioral: Negative            Current Medications       Current Outpatient Medications:   •  amoxicillin-clavulanate (AUGMENTIN) 875-125 mg per tablet, Take 1 tablet by mouth every 12 (twelve) hours for 7 days, Disp: 14 tablet, Rfl: 0  •  atorvastatin (LIPITOR) 20 mg tablet, Take 1 tablet (20 mg total) by mouth daily, Disp: 90 tablet, Rfl: 0  •  multivitamin (THERAGRAN) TABS, Take 1 tablet by mouth daily, Disp: , Rfl:   •  OLANZapine (ZyPREXA) 15 mg tablet, Take 15 mg by mouth daily at bedtime , Disp: , Rfl:   •  nicotine (NICODERM CQ) 14 mg/24hr TD 24 hr patch, Place 1 patch on the skin every 24 hours (Patient not taking: Reported on 12/29/2022), Disp: 28 patch, Rfl: 0    Current Allergies     Allergies as of 12/29/2022   • (No Known Allergies)            The following portions of the patient's history were reviewed and updated as appropriate: allergies, current medications, past family history, past medical history, past social history, past surgical history and problem list      Past Medical History:   Diagnosis Date   • GERD (gastroesophageal reflux disease)    • H  pylori infection    • Hematochezia    • Hypercholesteremia    • Hyperlipidemia    • Psychosis ([facility] )     Last Assessed:  11/19/15   • Schizophrenia [facility])        Past Surgical History:   Procedure Laterality Date   • COLONOSCOPY     • NE COLONOSCOPY FLX DX W/COLLJ SPEC WHEN PFRMD N/A 9/27/2017    Procedure: COLONOSCOPY;  Surgeon: Maverick Montes MD;  Location: [facility]; Service: Gastroenterology       Family History   Problem Relation Age of Onset   • Hypertension Family    • Cancer Family          Medications have been verified  Objective   /89   Pulse 85   Temp 97 7 °F (36 5 °C) (Temporal)   Resp 18   SpO2 98%        Physical Exam     Physical Exam  Vitals and nursing note reviewed  Constitutional:       General: He is not in acute distress  Appearance: Normal appearance  He is not ill-appearing  HENT:      Head: Normocephalic and atraumatic  Right Ear: External ear normal       Left Ear: External ear normal       Nose: Nose normal       Mouth/Throat:      Lips: Pink  Mouth: Mucous membranes are moist       Dentition: Dental caries present        Pharynx: Oropharynx is clear       Comments: Bottom R last 2 molars with fillings and erythematous gum with mild swelling  No drainage or abscess noted  Eyes:      General: Vision grossly intact  Extraocular Movements: Extraocular movements intact  Pupils: Pupils are equal, round, and reactive to light  Cardiovascular:      Rate and Rhythm: Normal rate and regular rhythm  Heart sounds: Normal heart sounds  No murmur heard  Pulmonary:      Effort: Pulmonary effort is normal       Breath sounds: Normal breath sounds  Abdominal:      General: Abdomen is flat  Bowel sounds are normal       Palpations: Abdomen is soft  Musculoskeletal:         General: Normal range of motion  Cervical back: Normal range of motion  Skin:     General: Skin is warm  Findings: No rash  Neurological:      Mental Status: He is alert and oriented to person, place, and time  Motor: Motor function is intact     Psychiatric:         Attention and Perception: Attention normal          Mood and Affect: Mood normal

## 2022-12-29 NOTE — PATIENT INSTRUCTIONS
Take Augmentin with food as prescribed  Tylenol/motrin as needed for pain  Salt water gargles  Ice over area  Follow up with PCP  Follow up with dentist     Dental Abscess   AMBULATORY CARE:   A dental abscess  is a collection of pus in or around a tooth  A dental abscess is caused by bacteria  The bacteria can enter the tooth when the enamel (outer part of the tooth) is damaged by tooth decay  Bacteria can also enter the tooth through a chip in the tooth or a cut in the gum  Food particles that are stuck between the teeth for a long time may also lead to an abscess  Common signs and symptoms:   Toothache, a loose tooth, or a tooth that is very sensitive to pressure or temperature    Bad breath, unpleasant taste, and drooling    Fever    Pain, redness, and swelling of the gums, or swelling of your face and neck    Pain when you open or close your mouth    Trouble opening your mouth    Seek care immediately if:   You have severe pain in your tooth or jaw  You have trouble breathing because of pain or swelling  Call your doctor if:   Your symptoms get worse, even after treatment  Your mouth is bleeding  You cannot eat or drink because of pain or swelling  Your abscess returns  You have an injury that causes a crack in your tooth  You have questions or concerns about your condition or care  Treatment:  You may  need any of the following:  Medicines  may be given to treat a bacterial infection and decrease pain  Incision and drainage  is a cut in the abscess to allow the pus to drain  A sample of fluid may be collected from your abscess  The fluid is sent to a lab and tested for bacteria  Ask your healthcare provider for more information  A root canal  is a procedure to remove the bacteria and prevent more infection  It is usually done after an incision and drainage   A filling or crown will be placed over the tooth after you have healed from your root canal      Tooth removal  may be needed if the infection affects deeper tissues  This is usually done after an incision and drainage  Self-care:   Rinse your mouth every 2 hours with salt water  This will help keep the area clean  Gently brush your teeth twice a day with a soft tooth brush  This will help keep the area clean  Eat soft foods as directed  Soft foods may cause less pain  Examples include applesauce, yogurt, and cooked pasta  Ask your healthcare provider how long to follow this instruction  Apply a warm compress to your tooth or gum  Use a cotton ball or gauze soaked in warm water  Remove the compress in 10 minutes or when it becomes cool  Repeat 3 times a day  Prevent another abscess:   Brush your teeth at least 2 times a day  with fluoride toothpaste  Use dental floss at least once a day  to clean between your teeth  Rinse your mouth with water or mouthwash  after meals and snacks  Chew sugarless gum  Avoid sugary and starchy food that can stick between your teeth  Limit drinks high in sugar, such as soda or fruit juice  See your dentist every 6 months  for dental cleanings and oral exams  Follow up with your doctor or dentist as directed: Your healthcare provider will need to check your teeth and gums  Write down your questions so you remember to ask them during your visits  © Copyright [employer] 2022 Information is for End User's use only and may not be sold, redistributed or otherwise used for commercial purposes  All illustrations and images included in CareNotes® are the copyrighted property of A D A M [employer] Inc  or Urvashi Howard   The above information is an  only  It is not intended as medical advice for individual conditions or treatments  Talk to your doctor, nurse or pharmacist before following any medical regimen to see if it is safe and effective for you

## 2023-01-31 DIAGNOSIS — E78.5 HYPERLIPIDEMIA, UNSPECIFIED HYPERLIPIDEMIA TYPE: ICD-10-CM

## 2023-01-31 RX ORDER — ATORVASTATIN CALCIUM 20 MG/1
TABLET, FILM COATED ORAL
Qty: 90 TABLET | Refills: 0 | Status: SHIPPED | OUTPATIENT
Start: 2023-01-31

## 2023-03-17 ENCOUNTER — APPOINTMENT (OUTPATIENT)
Dept: LAB | Facility: HOSPITAL | Age: 33
End: 2023-03-17

## 2023-03-17 DIAGNOSIS — E78.5 HYPERLIPIDEMIA, UNSPECIFIED HYPERLIPIDEMIA TYPE: ICD-10-CM

## 2023-03-17 DIAGNOSIS — R73.01 IMPAIRED FASTING GLUCOSE: ICD-10-CM

## 2023-03-17 LAB
ALBUMIN SERPL BCP-MCNC: 4.8 G/DL (ref 3.5–5)
ALP SERPL-CCNC: 66 U/L (ref 34–104)
ALT SERPL W P-5'-P-CCNC: 49 U/L (ref 7–52)
ANION GAP SERPL CALCULATED.3IONS-SCNC: 8 MMOL/L (ref 4–13)
AST SERPL W P-5'-P-CCNC: 23 U/L (ref 13–39)
BASOPHILS # BLD AUTO: 0.04 THOUSANDS/ÂΜL (ref 0–0.1)
BASOPHILS NFR BLD AUTO: 1 % (ref 0–1)
BILIRUB SERPL-MCNC: 0.8 MG/DL (ref 0.2–1)
BUN SERPL-MCNC: 10 MG/DL (ref 5–25)
CALCIUM SERPL-MCNC: 9.6 MG/DL (ref 8.4–10.2)
CHLORIDE SERPL-SCNC: 103 MMOL/L (ref 96–108)
CHOLEST SERPL-MCNC: 186 MG/DL
CO2 SERPL-SCNC: 24 MMOL/L (ref 21–32)
CREAT SERPL-MCNC: 0.84 MG/DL (ref 0.6–1.3)
EOSINOPHIL # BLD AUTO: 0.19 THOUSAND/ÂΜL (ref 0–0.61)
EOSINOPHIL NFR BLD AUTO: 3 % (ref 0–6)
ERYTHROCYTE [DISTWIDTH] IN BLOOD BY AUTOMATED COUNT: 12.4 % (ref 11.6–15.1)
EST. AVERAGE GLUCOSE BLD GHB EST-MCNC: 120 MG/DL
GFR SERPL CREATININE-BSD FRML MDRD: 116 ML/MIN/1.73SQ M
GLUCOSE P FAST SERPL-MCNC: 112 MG/DL (ref 65–99)
HBA1C MFR BLD: 5.8 %
HCT VFR BLD AUTO: 45.7 % (ref 36.5–49.3)
HDLC SERPL-MCNC: 37 MG/DL
HGB BLD-MCNC: 15.7 G/DL (ref 12–17)
IMM GRANULOCYTES # BLD AUTO: 0.03 THOUSAND/UL (ref 0–0.2)
IMM GRANULOCYTES NFR BLD AUTO: 0 % (ref 0–2)
LDLC SERPL CALC-MCNC: 101 MG/DL (ref 0–100)
LYMPHOCYTES # BLD AUTO: 1.73 THOUSANDS/ÂΜL (ref 0.6–4.47)
LYMPHOCYTES NFR BLD AUTO: 26 % (ref 14–44)
MCH RBC QN AUTO: 29.5 PG (ref 26.8–34.3)
MCHC RBC AUTO-ENTMCNC: 34.4 G/DL (ref 31.4–37.4)
MCV RBC AUTO: 86 FL (ref 82–98)
MONOCYTES # BLD AUTO: 0.44 THOUSAND/ÂΜL (ref 0.17–1.22)
MONOCYTES NFR BLD AUTO: 7 % (ref 4–12)
NEUTROPHILS # BLD AUTO: 4.29 THOUSANDS/ÂΜL (ref 1.85–7.62)
NEUTS SEG NFR BLD AUTO: 63 % (ref 43–75)
NONHDLC SERPL-MCNC: 149 MG/DL
NRBC BLD AUTO-RTO: 0 /100 WBCS
PLATELET # BLD AUTO: 278 THOUSANDS/UL (ref 149–390)
PMV BLD AUTO: 9.4 FL (ref 8.9–12.7)
POTASSIUM SERPL-SCNC: 4 MMOL/L (ref 3.5–5.3)
PROT SERPL-MCNC: 8.1 G/DL (ref 6.4–8.4)
RBC # BLD AUTO: 5.33 MILLION/UL (ref 3.88–5.62)
SODIUM SERPL-SCNC: 135 MMOL/L (ref 135–147)
TRIGL SERPL-MCNC: 239 MG/DL
TSH SERPL DL<=0.05 MIU/L-ACNC: 1.42 UIU/ML (ref 0.45–4.5)
WBC # BLD AUTO: 6.72 THOUSAND/UL (ref 4.31–10.16)

## 2023-03-22 ENCOUNTER — TELEPHONE (OUTPATIENT)
Dept: INTERNAL MEDICINE CLINIC | Facility: CLINIC | Age: 33
End: 2023-03-22

## 2023-04-24 ENCOUNTER — HOSPITAL ENCOUNTER (EMERGENCY)
Facility: HOSPITAL | Age: 33
Discharge: HOME | End: 2023-04-25
Attending: EMERGENCY MEDICINE

## 2023-04-24 ENCOUNTER — APPOINTMENT (EMERGENCY)
Dept: CT IMAGING | Facility: HOSPITAL | Age: 33
End: 2023-04-24

## 2023-04-24 VITALS
SYSTOLIC BLOOD PRESSURE: 137 MMHG | RESPIRATION RATE: 16 BRPM | HEART RATE: 108 BPM | OXYGEN SATURATION: 100 % | TEMPERATURE: 98.6 F | DIASTOLIC BLOOD PRESSURE: 82 MMHG

## 2023-04-24 DIAGNOSIS — S09.90XA CLOSED HEAD INJURY, INITIAL ENCOUNTER: Primary | ICD-10-CM

## 2023-04-24 DIAGNOSIS — R41.0 CONFUSION: ICD-10-CM
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
## 2023-04-28 ENCOUNTER — OFFICE VISIT (OUTPATIENT)
Age: 33
End: 2023-04-28

## 2023-04-28 VITALS
RESPIRATION RATE: 18 BRPM | HEART RATE: 85 BPM | BODY MASS INDEX: 33.91 KG/M2 | OXYGEN SATURATION: 100 % | TEMPERATURE: 98.7 F | SYSTOLIC BLOOD PRESSURE: 116 MMHG | DIASTOLIC BLOOD PRESSURE: 68 MMHG | WEIGHT: 223 LBS

## 2023-04-28 DIAGNOSIS — R05.8 POST-VIRAL COUGH SYNDROME: Primary | ICD-10-CM

## 2023-04-28 PROBLEM — F20.0 CHRONIC PARANOID SCHIZOPHRENIA (HCC): Status: ACTIVE | Noted: 2020-09-01

## 2023-04-28 PROBLEM — E78.5 DYSLIPIDEMIA (HIGH LDL; LOW HDL): Status: ACTIVE | Noted: 2020-09-08

## 2023-04-28 RX ORDER — BENZONATATE 200 MG/1
200 CAPSULE ORAL 3 TIMES DAILY PRN
COMMUNITY
Start: 2023-04-05 | End: 2023-04-28 | Stop reason: ALTCHOICE

## 2023-04-28 RX ORDER — PENICILLIN V POTASSIUM 500 MG/1
TABLET ORAL
COMMUNITY
Start: 2023-02-02

## 2023-04-28 RX ORDER — BENZONATATE 100 MG/1
100 CAPSULE ORAL 3 TIMES DAILY PRN
Qty: 20 CAPSULE | Refills: 0 | Status: SHIPPED | OUTPATIENT
Start: 2023-04-28

## 2023-04-28 RX ORDER — IBUPROFEN 800 MG/1
800 TABLET ORAL EVERY 8 HOURS PRN
COMMUNITY
Start: 2023-02-02

## 2023-04-28 NOTE — PROGRESS NOTES
[facility]        NAME: Deo Solorzano is a 29 y o  male  : 1990    MRN: [id number]  DATE: 2023  TIME: 2:25 PM    Assessment and Plan   Post-viral cough syndrome [R05 8]  1  Post-viral cough syndrome  benzonatate (TESSALON PERLES) 100 mg capsule        COVID/flu testing deferred as symptoms have been ongoing for 2 weeks  Patient Instructions     Viral coughs may linger for weeks  Take medications as needed for next 7 days  May also use over-the-counter decongestants (mucinex-dm, sudafed) and nasal sprays (flonase, saline) as needed  Follow-up with PCP in 3-5 days if no improvement of symptoms  Report to ER if symptoms worsen or develop shortness of breath  Chief Complaint     Chief Complaint   Patient presents with   • Cough     Cough for 2 weeks         History of Present Illness       29year old male presents for evaluation of cough ongoing for 2 weeks that started with a URI  Reports URI symptoms resolved, but cough has been lingering and is worse when he wakes up in the morning  He denies any associated fever, chills, fatigue, or shortness of breath  He was prescribed tessalon perles in the past, which has worked for his cough  He has not been taking any medicaitons for symptoms  Cough  This is a recurrent problem  The current episode started 1 to 4 weeks ago  The problem has been unchanged  The problem occurs every few minutes  The cough is non-productive  Associated symptoms include nasal congestion  Pertinent negatives include no chest pain, chills, ear congestion, ear pain, fever, headaches, heartburn, hemoptysis, myalgias, postnasal drip, rash, rhinorrhea, sore throat, shortness of breath, sweats, weight loss or wheezing  The symptoms are aggravated by lying down  He has tried nothing for the symptoms  The treatment provided no relief  There is no history of asthma, bronchitis, environmental allergies or pneumonia         Review of Systems   Review of Systems Constitutional: Negative for activity change, appetite change, chills, fatigue, fever and weight loss  HENT: Positive for congestion  Negative for ear pain, postnasal drip, rhinorrhea, sinus pressure, sinus pain, sneezing, sore throat and trouble swallowing  Respiratory: Positive for cough  Negative for hemoptysis, chest tightness, shortness of breath and wheezing  Cardiovascular: Negative for chest pain and palpitations  Gastrointestinal: Negative for abdominal pain, constipation, diarrhea, heartburn, nausea and vomiting  Musculoskeletal: Negative for arthralgias, back pain and myalgias  Skin: Negative for color change and rash  Allergic/Immunologic: Negative for environmental allergies  Neurological: Negative for dizziness, light-headedness and headaches           Current Medications       Current Outpatient Medications:   •  atorvastatin (LIPITOR) 20 mg tablet, TAKE ONE TABLET BY MOUTH EVERY DAY, Disp: 90 tablet, Rfl: 0  •  benzonatate (TESSALON PERLES) 100 mg capsule, Take 1 capsule (100 mg total) by mouth 3 (three) times a day as needed for cough, Disp: 20 capsule, Rfl: 0  •  multivitamin (THERAGRAN) TABS, Take 1 tablet by mouth daily, Disp: , Rfl:   •  OLANZapine (ZyPREXA) 15 mg tablet, Take 15 mg by mouth daily at bedtime , Disp: , Rfl:   •  ibuprofen (MOTRIN) 800 mg tablet, Take 800 mg by mouth every 8 (eight) hours as needed (Patient not taking: Reported on 4/28/2023), Disp: , Rfl:   •  nicotine (NICODERM CQ) 14 mg/24hr TD 24 hr patch, Place 1 patch on the skin every 24 hours (Patient not taking: Reported on 12/29/2022), Disp: 28 patch, Rfl: 0  •  penicillin V potassium (VEETID) 500 mg tablet, TAKE TWO TABLETS BY MOUTH NOW, THEN TAKE ONE TABLET BY MOUTH FOUR TIMES A DAY FOR 7 DAYS (Patient not taking: Reported on 4/28/2023), Disp: , Rfl:     Current Allergies     Allergies as of 04/28/2023   • (No Known Allergies)            The following portions of the patient's history were reviewed and updated as appropriate: allergies, current medications, past family history, past medical history, past social history, past surgical history and problem list      Past Medical History:   Diagnosis Date   • GERD (gastroesophageal reflux disease)    • H  pylori infection    • Hematochezia    • Hypercholesteremia    • Hyperlipidemia    • Psychosis ([address] )     Last Assessed:  11/19/15   • Schizophrenia [facility])        Past Surgical History:   Procedure Laterality Date   • COLONOSCOPY     • [place] COLONOSCOPY FLX DX W/COLLJ SPEC WHEN PFRMD N/A 9/27/2017    Procedure: COLONOSCOPY;  Surgeon: Kate Ramos MD;  Location: [facility]; Service: Gastroenterology       Family History   Problem Relation Age of Onset   • Hypertension Family    • Cancer Family          Medications have been verified  Objective   /68 (BP Location: Left arm, Patient Position: Sitting, Cuff Size: Adult)   Pulse 85   Temp 98 7 °F (37 1 °C) (Tympanic)   Resp 18   Wt 101 kg (223 lb)   SpO2 100%   BMI 33 91 kg/m²        Physical Exam     Physical Exam  Vitals and nursing note reviewed  Constitutional:       General: He is awake  Appearance: Normal appearance  He is well-developed and normal weight  HENT:      Head: Normocephalic and atraumatic  Right Ear: Hearing, tympanic membrane, ear canal and external ear normal       Left Ear: Hearing, tympanic membrane, ear canal and external ear normal       Nose: Congestion present  No rhinorrhea  Right Turbinates: Enlarged  Not swollen or pale  Left Turbinates: Enlarged  Not swollen or pale  Right Sinus: No maxillary sinus tenderness or frontal sinus tenderness  Left Sinus: No maxillary sinus tenderness or frontal sinus tenderness  Mouth/Throat:      Lips: Pink  Mouth: Mucous membranes are moist       Pharynx: Oropharynx is clear  Uvula midline  No oropharyngeal exudate or posterior oropharyngeal erythema        Tonsils: No tonsillar exudate or tonsillar abscesses  2+ on the right  2+ on the left  Eyes:      General: Vision grossly intact  Extraocular Movements: Extraocular movements intact  Conjunctiva/sclera: Conjunctivae normal       Pupils: Pupils are equal, round, and reactive to light  Visual Fields: Right eye visual fields normal and left eye visual fields normal    Cardiovascular:      Rate and Rhythm: Normal rate and regular rhythm  Pulses: Normal pulses  Heart sounds: Normal heart sounds  Pulmonary:      Effort: Pulmonary effort is normal       Breath sounds: Normal breath sounds  Musculoskeletal:      Cervical back: Full passive range of motion without pain, normal range of motion and neck supple  Lymphadenopathy:      Cervical: No cervical adenopathy  Skin:     General: Skin is warm and dry  Neurological:      Mental Status: He is alert and oriented to person, place, and time  Psychiatric:         Mood and Affect: Mood normal          Behavior: Behavior normal  Behavior is cooperative  Thought Content:  Thought content normal          Judgment: Judgment normal

## 2023-04-28 NOTE — PATIENT INSTRUCTIONS
Viral coughs may linger for weeks  Take medications as needed for next 7 days  May also use over-the-counter decongestants (mucinex-dm, sudafed) and nasal sprays (flonase, saline) as needed  Follow-up with PCP in 3-5 days if no improvement of symptoms  Report to ER if symptoms worsen or develop shortness of breath

## 2023-05-31 ENCOUNTER — RA CDI HCC (OUTPATIENT)
Dept: OTHER | Facility: HOSPITAL | Age: 33
End: 2023-05-31

## 2023-06-06 ENCOUNTER — OFFICE VISIT (OUTPATIENT)
Age: 33
End: 2023-06-06
Payer: MEDICARE

## 2023-06-06 VITALS
HEART RATE: 66 BPM | RESPIRATION RATE: 16 BRPM | OXYGEN SATURATION: 97 % | HEIGHT: 68 IN | WEIGHT: 217 LBS | DIASTOLIC BLOOD PRESSURE: 72 MMHG | TEMPERATURE: 97.8 F | BODY MASS INDEX: 32.89 KG/M2 | SYSTOLIC BLOOD PRESSURE: 122 MMHG

## 2023-06-06 DIAGNOSIS — E66.01 MORBID OBESITY DUE TO EXCESS CALORIES (HCC): ICD-10-CM

## 2023-06-06 DIAGNOSIS — K21.9 GASTROESOPHAGEAL REFLUX DISEASE WITHOUT ESOPHAGITIS: Primary | ICD-10-CM

## 2023-06-06 DIAGNOSIS — F20.9 SCHIZOPHRENIA, UNSPECIFIED TYPE (HCC): ICD-10-CM

## 2023-06-06 DIAGNOSIS — E78.5 HYPERLIPIDEMIA, UNSPECIFIED HYPERLIPIDEMIA TYPE: ICD-10-CM

## 2023-06-06 DIAGNOSIS — R73.01 IMPAIRED FASTING GLUCOSE: ICD-10-CM

## 2023-06-06 DIAGNOSIS — G47.33 OSA (OBSTRUCTIVE SLEEP APNEA): ICD-10-CM

## 2023-06-06 PROCEDURE — 99214 OFFICE O/P EST MOD 30 MIN: CPT | Performed by: INTERNAL MEDICINE

## 2023-06-06 NOTE — PATIENT INSTRUCTIONS
Lab data reviewed in detail and compared to prior    Dyslipidemia-LDL has not risen dramatically despite stopping atorvastatin  Triglycerides are quite elevated  Concentrate on exercise and weight loss efforts and recheck in 6 months    Impaired fasting [id number] six 5 8, implications discussed  Increase aerobic exercise, weight loss and low-carb diet advised  Schizophrenia under care of [facility]    GERD stable with over-the-counter Rx as needed    Routine follow-up after labs in 6 months, sooner as needed
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
## 2023-08-11 ENCOUNTER — HOSPITAL ENCOUNTER (EMERGENCY)
Facility: HOSPITAL | Age: 33
Discharge: ANOTHER HOSPITAL | End: 2023-08-12
Attending: EMERGENCY MEDICINE
Payer: MEDICARE

## 2023-08-11 DIAGNOSIS — F20.0 CHRONIC PARANOID SCHIZOPHRENIA (HCC): Primary | ICD-10-CM

## 2023-08-11 LAB
ALBUMIN SERPL BCP-MCNC: 4.9 G/DL (ref 3.5–5)
ALP SERPL-CCNC: 62 U/L (ref 34–104)
ALT SERPL W P-5'-P-CCNC: 42 U/L (ref 7–52)
ANION GAP SERPL CALCULATED.3IONS-SCNC: 8 MMOL/L
AST SERPL W P-5'-P-CCNC: 23 U/L (ref 13–39)
BASOPHILS # BLD AUTO: 0.04 THOUSANDS/ÂΜL (ref 0–0.1)
BASOPHILS NFR BLD AUTO: 1 % (ref 0–1)
BILIRUB SERPL-MCNC: 0.61 MG/DL (ref 0.2–1)
BUN SERPL-MCNC: 15 MG/DL (ref 5–25)
CALCIUM SERPL-MCNC: 9.7 MG/DL (ref 8.4–10.2)
CHLORIDE SERPL-SCNC: 108 MMOL/L (ref 96–108)
CO2 SERPL-SCNC: 22 MMOL/L (ref 21–32)
CREAT SERPL-MCNC: 1.11 MG/DL (ref 0.6–1.3)
EOSINOPHIL # BLD AUTO: 0.19 THOUSAND/ÂΜL (ref 0–0.61)
EOSINOPHIL NFR BLD AUTO: 3 % (ref 0–6)
ERYTHROCYTE [DISTWIDTH] IN BLOOD BY AUTOMATED COUNT: 12.6 % (ref 11.6–15.1)
ETHANOL EXG-MCNC: 0 MG/DL
GFR SERPL CREATININE-BSD FRML MDRD: 87 ML/MIN/1.73SQ M
GLUCOSE SERPL-MCNC: 104 MG/DL (ref 65–140)
HCT VFR BLD AUTO: 41 % (ref 36.5–49.3)
HGB BLD-MCNC: 13.9 G/DL (ref 12–17)
IMM GRANULOCYTES # BLD AUTO: 0.01 THOUSAND/UL (ref 0–0.2)
IMM GRANULOCYTES NFR BLD AUTO: 0 % (ref 0–2)
LYMPHOCYTES # BLD AUTO: 2.05 THOUSANDS/ÂΜL (ref 0.6–4.47)
LYMPHOCYTES NFR BLD AUTO: 28 % (ref 14–44)
MCH RBC QN AUTO: 29.2 PG (ref 26.8–34.3)
MCHC RBC AUTO-ENTMCNC: 33.9 G/DL (ref 31.4–37.4)
MCV RBC AUTO: 86 FL (ref 82–98)
MONOCYTES # BLD AUTO: 0.64 THOUSAND/ÂΜL (ref 0.17–1.22)
MONOCYTES NFR BLD AUTO: 9 % (ref 4–12)
NEUTROPHILS # BLD AUTO: 4.52 THOUSANDS/ÂΜL (ref 1.85–7.62)
NEUTS SEG NFR BLD AUTO: 59 % (ref 43–75)
NRBC BLD AUTO-RTO: 0 /100 WBCS
PLATELET # BLD AUTO: 273 THOUSANDS/UL (ref 149–390)
PMV BLD AUTO: 8.8 FL (ref 8.9–12.7)
POTASSIUM SERPL-SCNC: 3.8 MMOL/L (ref 3.5–5.3)
PROT SERPL-MCNC: 7.9 G/DL (ref 6.4–8.4)
RBC # BLD AUTO: 4.76 MILLION/UL (ref 3.88–5.62)
SODIUM SERPL-SCNC: 138 MMOL/L (ref 135–147)
WBC # BLD AUTO: 7.45 THOUSAND/UL (ref 4.31–10.16)

## 2023-08-11 PROCEDURE — 99285 EMERGENCY DEPT VISIT HI MDM: CPT | Performed by: EMERGENCY MEDICINE

## 2023-08-11 PROCEDURE — 85025 COMPLETE CBC W/AUTO DIFF WBC: CPT | Performed by: EMERGENCY MEDICINE

## 2023-08-11 PROCEDURE — 82075 ASSAY OF BREATH ETHANOL: CPT | Performed by: EMERGENCY MEDICINE

## 2023-08-11 PROCEDURE — 99283 EMERGENCY DEPT VISIT LOW MDM: CPT

## 2023-08-11 PROCEDURE — 36415 COLL VENOUS BLD VENIPUNCTURE: CPT | Performed by: EMERGENCY MEDICINE

## 2023-08-11 PROCEDURE — 80053 COMPREHEN METABOLIC PANEL: CPT | Performed by: EMERGENCY MEDICINE

## 2023-08-11 NOTE — LETTER
[address] [facility] [address]  Dept: [phone]      EMTALA TRANSFER CONSENT    NAME Stanley Mandujano                                         1990                              MRN [id number]    I have been informed of my rights regarding examination, treatment, and transfer   by Dr. Laisha torres. providers found    Benefits: Continuity of care    Risks: Potential for delay in receiving treatment      Consent for Transfer:  I acknowledge that my medical condition has been evaluated and explained to me by the emergency department physician or other qualified medical person and/or my attending physician, who has recommended that I be transferred to the service of  Accepting Physician: Dr Maryam Vilchis at [address] [facility] [address] Name, [facility] : [facility]. The above potential benefits of such transfer, the potential risks associated with such transfer, and the probable risks of not being transferred have been explained to me, and I fully understand them. The doctor has explained that, in my case, the benefits of transfer outweigh the risks. I agree to be transferred. I authorize the performance of emergency medical procedures and treatments upon me in both transit and upon arrival at the receiving facility. Additionally, I authorize the release of any and all medical records to the receiving facility and request they be transported with me, if possible. I understand that the safest mode of transportation during a medical emergency is an ambulance and that the Hospital advocates the use of this mode of transport. Risks of traveling to the receiving facility by car, including absence of medical control, life sustaining equipment, such as oxygen, and medical personnel has been explained to me and I fully understand them. (RICHARD CORRECT BOX BELOW)  [ X ]  I consent to the stated transfer and to be transported by ambulance/helicopter.   [  ]  I consent to the stated transfer, but refuse transportation by ambulance and accept full responsibility for my transportation by car. I understand the risks of non-ambulance transfers and I exonerate the Hospital and its staff from any deterioration in my condition that results from this refusal.    X______Pt is a 302_____________________________________    DATE  23  TIME________  Signature of patient or legally responsible individual signing on patient behalf           RELATIONSHIP TO PATIENT_________________________                              Provider Certification    NAME Bernice Olivares                                         1990                              MRN [id number]    A medical screening exam was performed on the above named patient. Based on the examination:    Condition Necessitating Transfer The encounter diagnosis was Chronic paranoid schizophrenia ([address]). Patient Condition: The patient has been stabilized such that within reasonable medical probability, no material deterioration of the patient condition or the condition of the unborn child(ralf) is likely to result from the transfer    Reason for Transfer: Level of Care needed not available at this facility    Transfer Requirements: [address] available and qualified personnel available for treatment as acknowledged by Galdino Lombardo, CIS  @ [phone]  Agreed to accept transfer and to provide appropriate medical treatment as acknowledged by       Dr Ivy Torres  Appropriate medical records of the examination and treatment of the patient are provided at the time of transfer   [address] __[facility]. A._____  Transfer will be performed by qualified personnel from [facility]  and appropriate transfer equipment as required, including the use of necessary and appropriate life support measures.     Provider Certification: I have examined the patient and explained the following risks and benefits of being transferred/refusing transfer to the patient/family:  General risk, such as traffic hazards, adverse weather conditions, rough terrain or turbulence, possible failure of equipment (including vehicle or aircraft), or consequences of actions of persons outside the control of the transport personnel      Based on these reasonable risks and benefits to the patient and/or the unborn child(ralf), and based upon the information available at the time of the patient’s examination, I certify that the medical benefits reasonably to be expected from the provision of appropriate medical treatments at another medical facility outweigh the increasing risks, if any, to the individual’s medical condition, and in the case of labor to the unborn child, from effecting the transfer.     X____________________________________________ DATE 08/12/23        TIME_______      ORIGINAL - SEND TO MEDICAL RECORDS   COPY - SEND WITH PATIENT DURING TRANSFER

## 2023-08-12 VITALS
SYSTOLIC BLOOD PRESSURE: 124 MMHG | RESPIRATION RATE: 18 BRPM | DIASTOLIC BLOOD PRESSURE: 78 MMHG | OXYGEN SATURATION: 99 % | HEART RATE: 64 BPM | TEMPERATURE: 98.7 F

## 2023-08-12 LAB
AMPHETAMINES SERPL QL SCN: NEGATIVE
BARBITURATES UR QL: NEGATIVE
BENZODIAZ UR QL: NEGATIVE
COCAINE UR QL: NEGATIVE
METHADONE UR QL: NEGATIVE
OPIATES UR QL SCN: NEGATIVE
OXYCODONE+OXYMORPHONE UR QL SCN: NEGATIVE
PCP UR QL: NEGATIVE
THC UR QL: NEGATIVE

## 2023-08-12 PROCEDURE — 80307 DRUG TEST PRSMV CHEM ANLYZR: CPT | Performed by: EMERGENCY MEDICINE

## 2023-08-12 PROCEDURE — G0427 INPT/ED TELECONSULT70: HCPCS | Performed by: GENERAL PRACTICE

## 2023-08-12 RX ORDER — ARIPIPRAZOLE 5 MG/1
5 TABLET ORAL DAILY
Status: DISCONTINUED | OUTPATIENT
Start: 2023-08-12 | End: 2023-08-13 | Stop reason: HOSPADM

## 2023-08-12 NOTE — ED NOTES
Received call from [facility] stating that they are unable to accept the patient - the patient has a diagnosis of sleep apnea and does not wear a CPAP.

## 2023-08-12 NOTE — ED NOTES
Patient is accepted at [facility].  Patient is accepted by Dr. Asher Hein per Johnna Dyson. Transportation is arranged with [facility] via [address] [place] [address]. Transportation is scheduled for 20:00. Patient may go to the floor at anytime. CW informed [address] [place] [address] of pt's ETA.         SISSY Puente, CIS ll  08/12/23 18:45
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
## 2023-08-12 NOTE — ED NOTES
Pt is resting comfortably with no outward signs of distress. Pt denies any needs at this time.      Rufino Damon RN  08/12/23 4544

## 2023-08-12 NOTE — ED NOTES
CW spoke with: Clay Loving of Intake - No male beds available this weekend. Latanya Brewer - No male 302 beds available. [address] - No male beds    [facility] - No adult beds at all    [facility] beds [facility]    [facility]    [facility] - No adult beds before Monday    [facility] - Has beds; CW faxed chart for review. [facility] Wood[facility] - Has 1 male bed left for tomorrow; CW faxed chart for review. [facility] - Has beds; [facility] faxed chart for review. [facility] - Has beds; [facility] faxed chart for review.     SISSY Sales, CIS ll  08/12/23 17:57

## 2023-08-12 NOTE — ED NOTES
Assessed needs of patient. At this time, no needs expressed. Refused breakfast. Told patient if he changed his mind to let myself or his 1:1 know. Patient cooperative, sitting up in bed with flat affect.      Bc William RN  08/12/23 4802

## 2023-08-12 NOTE — ED NOTES
302 was faxed to intake. There are no appropriate in-network beds available. Bed search was initiated.     [facility] - no beds per Vince-Swetha - bed is available per Nu Adams, clinical was faxed  [facility] - bed is available per Dominic Cole, clinical was faxed

## 2023-08-13 NOTE — ED NOTES
Per [facility], pt to be transported to [facility] via [facility] transport @2200.      Mily Shen RN  08/12/23 7682

## 2023-08-13 NOTE — ED NOTES
CW faxed pt's 36 petition, CRF and ACT 77 to [facility].     JOAQUÍN Rosen [facility] ADOLESCENT TREATMENT [facility]  08/12/23 22:47

## 2023-11-23 ENCOUNTER — HOSPITAL ENCOUNTER (EMERGENCY)
Facility: HOSPITAL | Age: 33
Discharge: HOME | End: 2023-11-23
Attending: EMERGENCY MEDICINE
Payer: MEDICARE

## 2023-11-23 ENCOUNTER — APPOINTMENT (EMERGENCY)
Dept: RADIOLOGY | Facility: HOSPITAL | Age: 33
End: 2023-11-23
Payer: MEDICARE

## 2023-11-23 VITALS
TEMPERATURE: 98.4 F | RESPIRATION RATE: 20 BRPM | HEART RATE: 111 BPM | SYSTOLIC BLOOD PRESSURE: 136 MMHG | DIASTOLIC BLOOD PRESSURE: 83 MMHG | OXYGEN SATURATION: 98 %

## 2023-11-23 DIAGNOSIS — R07.9 CHEST PAIN, UNSPECIFIED TYPE: Primary | ICD-10-CM

## 2023-11-23 LAB
ANION GAP SERPL CALCULATED.3IONS-SCNC: 9 MMOL/L
BASOPHILS # BLD AUTO: 0.02 THOUSANDS/ÂΜL (ref 0–0.1)
BASOPHILS NFR BLD AUTO: 0 % (ref 0–1)
BUN SERPL-MCNC: 10 MG/DL (ref 5–25)
CALCIUM SERPL-MCNC: 9.6 MG/DL (ref 8.4–10.2)
CARDIAC TROPONIN I PNL SERPL HS: <2 NG/L
CHLORIDE SERPL-SCNC: 102 MMOL/L (ref 96–108)
CHOLEST SERPL-MCNC: 156 MG/DL
CO2 SERPL-SCNC: 24 MMOL/L (ref 21–32)
CREAT SERPL-MCNC: 0.93 MG/DL (ref 0.6–1.3)
EOSINOPHIL # BLD AUTO: 0.02 THOUSAND/ÂΜL (ref 0–0.61)
EOSINOPHIL NFR BLD AUTO: 0 % (ref 0–6)
ERYTHROCYTE [DISTWIDTH] IN BLOOD BY AUTOMATED COUNT: 12 % (ref 11.6–15.1)
GFR SERPL CREATININE-BSD FRML MDRD: 108 ML/MIN/1.73SQ M
GLUCOSE SERPL-MCNC: 87 MG/DL (ref 65–140)
HCT VFR BLD AUTO: 43.5 % (ref 36.5–49.3)
HDLC SERPL-MCNC: 36 MG/DL
HGB BLD-MCNC: 15.3 G/DL (ref 12–17)
IMM GRANULOCYTES # BLD AUTO: 0.02 THOUSAND/UL (ref 0–0.2)
IMM GRANULOCYTES NFR BLD AUTO: 0 % (ref 0–2)
LDLC SERPL CALC-MCNC: 97 MG/DL (ref 0–100)
LYMPHOCYTES # BLD AUTO: 1.17 THOUSANDS/ÂΜL (ref 0.6–4.47)
LYMPHOCYTES NFR BLD AUTO: 15 % (ref 14–44)
MCH RBC QN AUTO: 29.7 PG (ref 26.8–34.3)
MCHC RBC AUTO-ENTMCNC: 35.2 G/DL (ref 31.4–37.4)
MCV RBC AUTO: 84 FL (ref 82–98)
MONOCYTES # BLD AUTO: 0.42 THOUSAND/ÂΜL (ref 0.17–1.22)
MONOCYTES NFR BLD AUTO: 5 % (ref 4–12)
NEUTROPHILS # BLD AUTO: 6.29 THOUSANDS/ÂΜL (ref 1.85–7.62)
NEUTS SEG NFR BLD AUTO: 80 % (ref 43–75)
NONHDLC SERPL-MCNC: 120 MG/DL
NRBC BLD AUTO-RTO: 0 /100 WBCS
PLATELET # BLD AUTO: 272 THOUSANDS/UL (ref 149–390)
PMV BLD AUTO: 8.6 FL (ref 8.9–12.7)
POTASSIUM SERPL-SCNC: 4.2 MMOL/L (ref 3.5–5.3)
RBC # BLD AUTO: 5.16 MILLION/UL (ref 3.88–5.62)
SODIUM SERPL-SCNC: 135 MMOL/L (ref 135–147)
TRIGL SERPL-MCNC: 117 MG/DL
WBC # BLD AUTO: 7.94 THOUSAND/UL (ref 4.31–10.16)

## 2023-11-23 PROCEDURE — 36415 COLL VENOUS BLD VENIPUNCTURE: CPT | Performed by: NURSE PRACTITIONER

## 2023-11-23 PROCEDURE — 80061 LIPID PANEL: CPT | Performed by: NURSE PRACTITIONER

## 2023-11-23 PROCEDURE — 99284 EMERGENCY DEPT VISIT MOD MDM: CPT | Performed by: NURSE PRACTITIONER

## 2023-11-23 PROCEDURE — 80048 BASIC METABOLIC PNL TOTAL CA: CPT | Performed by: NURSE PRACTITIONER

## 2023-11-23 PROCEDURE — 99285 EMERGENCY DEPT VISIT HI MDM: CPT

## 2023-11-23 PROCEDURE — 93005 ELECTROCARDIOGRAM TRACING: CPT

## 2023-11-23 PROCEDURE — 71045 X-RAY EXAM CHEST 1 VIEW: CPT

## 2023-11-23 PROCEDURE — 85025 COMPLETE CBC W/AUTO DIFF WBC: CPT | Performed by: NURSE PRACTITIONER

## 2023-11-23 PROCEDURE — 84484 ASSAY OF TROPONIN QUANT: CPT | Performed by: NURSE PRACTITIONER

## 2023-11-23 RX ORDER — SODIUM CHLORIDE 9 MG/ML
3 INJECTION INTRAVENOUS
Status: DISCONTINUED | OUTPATIENT
Start: 2023-11-23 | End: 2023-11-23 | Stop reason: HOSPADM

## 2023-11-23 NOTE — ED PROVIDER NOTES
History  Chief Complaint   Patient presents with    Chest Pain     Pt reports one week of chest pain described as twitching and tightness that comes and goes. Denies cardiac history. Takes invega shot 831     59-year-old male patient presenting here with reports of twinges of chest discomfort for the last week. The episodes last only seconds and happen randomly. No associated nausea vomiting or diaphoresis. The discomfort is not exertional and happens randomly. He denies any recent illness. No cough. No recent travel or surgeries      Chest Pain  Associated symptoms: no abdominal pain, no back pain, no cough, no fever, no palpitations, no shortness of breath and not vomiting        Prior to Admission Medications   Prescriptions Last Dose Informant Patient Reported? Taking?   multivitamin (THERAGRAN) TABS  Self Yes No   Sig: Take 1 tablet by mouth daily      Facility-Administered Medications: None       Past Medical History:   Diagnosis Date    GERD (gastroesophageal reflux disease)     H. pylori infection     Hematochezia     Hypercholesteremia     Hyperlipidemia     Psychosis ([address])     Last Assessed:  11/19/15    Schizophrenia ([address])        Past Surgical History:   Procedure Laterality Date    COLONOSCOPY      SD COLONOSCOPY FLX DX W/COLLJ SPEC WHEN PFRMD N/A 2017    Procedure: COLONOSCOPY;  Surgeon: Refugio Gamez MD;  Location: [facility]; Service: Gastroenterology       Family History   Problem Relation Age of Onset    Hypertension Family     Cancer Family      I have reviewed and agree with the history as documented.     E-Cigarette/Vaping    E-Cigarette Use Never User      E-Cigarette/Vaping Substances    Nicotine No     THC No     CBD No     Flavoring No     Other No     Unknown No      Social History     Tobacco Use    Smoking status: Former     Packs/day: 0.25     Types: Cigarettes     Quit date: 2023     Years since quittin.6    Smokeless tobacco: Never    Tobacco comments:     2-3 cigarettes a day   Vaping Use    Vaping Use: Never used   Substance Use Topics    Alcohol use: Yes     Comment: occ    Drug use: No       Review of Systems   Constitutional:  Negative for chills and fever. HENT:  Negative for ear pain and sore throat. Eyes:  Negative for pain and visual disturbance. Respiratory:  Negative for cough and shortness of breath. Cardiovascular:  Positive for chest pain. Negative for palpitations. Gastrointestinal:  Negative for abdominal pain and vomiting. Genitourinary:  Negative for dysuria and hematuria. Musculoskeletal:  Negative for arthralgias and back pain. Skin:  Negative for color change and rash. Neurological:  Negative for seizures and syncope. All other systems reviewed and are negative. Physical Exam  Physical Exam  Vitals and nursing note reviewed. Constitutional:       General: He is not in acute distress. Appearance: He is well-developed. HENT:      Head: Normocephalic and atraumatic. Eyes:      General:         Right eye: No discharge. Left eye: No discharge. Conjunctiva/sclera: Conjunctivae normal.   Cardiovascular:      Rate and Rhythm: Normal rate. Pulmonary:      Effort: Pulmonary effort is normal. No respiratory distress. Abdominal:      General: There is no distension. Tenderness: There is no guarding. Musculoskeletal:         General: No deformity. Cervical back: Normal range of motion and neck supple. Skin:     General: Skin is warm and dry. Neurological:      Mental Status: He is alert and oriented to person, place, and time.       Coordination: Coordination normal.         Vital Signs  ED Triage Vitals   Temperature Pulse Respirations Blood Pressure SpO2   11/23/23 1236 11/23/23 1224 11/23/23 1224 11/23/23 1224 11/23/23 1224   98.4 °F (36.9 °C) (!) 111 20 136/83 98 %      Temp Source Heart Rate Source Patient Position - Orthostatic VS BP Location FiO2 (%)   11/23/23 1236 11/23/23 1224 11/23/23 1224 11/23/23 1224 --   Oral Monitor Sitting Right arm       Pain Score       11/23/23 1224       No Pain           Vitals:    11/23/23 1224   BP: 136/83   Pulse: (!) 111   Patient Position - Orthostatic VS: Sitting         Visual Acuity      ED Medications  Medications   sodium chloride (PF) 0.9 % injection 3 mL (has no administration in time range)       Diagnostic Studies  Results Reviewed       Procedure Component Value Units Date/Time    HS Troponin 0hr (reflex protocol) [[id number]]  (Normal) Collected: 11/23/23 1235    Lab Status: Final result Specimen: Blood from Arm, Left Updated: 11/23/23 1308     hs TnI 0hr <2 ng/L     HS Troponin I 2hr [[id number]]     Lab Status: No result Specimen: Blood     Basic metabolic panel [[id number]] Collected: 11/23/23 1235    Lab Status: Final result Specimen: Blood from Arm, Left Updated: 11/23/23 1300     Sodium 135 mmol/L      Potassium 4.2 mmol/L      Chloride 102 mmol/L      CO2 24 mmol/L      ANION GAP 9 mmol/L      BUN 10 mg/dL      Creatinine 0.93 mg/dL      Glucose 87 mg/dL      Calcium 9.6 mg/dL      eGFR 108 ml/min/1.73sq m     Narrative:      [facility] guidelines for Chronic Kidney Disease (CKD):     Stage 1 with normal or high GFR (GFR > 90 mL/min/1.73 square meters)    Stage 2 Mild CKD (GFR = 60-89 mL/min/1.73 square meters)    Stage 3A Moderate CKD (GFR = 45-59 mL/min/1.73 square meters)    Stage 3B Moderate CKD (GFR = 30-44 mL/min/1.73 square meters)    Stage 4 Severe CKD (GFR = 15-29 mL/min/1.73 square meters)    Stage 5 End Stage CKD (GFR <15 mL/min/1.73 square meters)  Note: GFR calculation is accurate only with a steady state creatinine    Lipid panel [[id number]]  (Abnormal) Collected: 11/23/23 1235    Lab Status: Final result Specimen: Blood from Arm, Left Updated: 11/23/23 1300     Cholesterol 156 mg/dL      Triglycerides 117 mg/dL      HDL, Direct 36 mg/dL      LDL Calculated 97 mg/dL      Non-HDL-Chol (CHOL-HDL) 120 mg/dl     CBC and differential [[id number]]  (Abnormal) Collected: 11/23/23 1235    Lab Status: Final result Specimen: Blood from Arm, Left Updated: 11/23/23 1242     WBC 7.94 Thousand/uL      RBC 5.16 Million/uL      Hemoglobin 15.3 g/dL      Hematocrit 43.5 %      MCV 84 fL      MCH 29.7 pg      MCHC 35.2 g/dL      RDW 12.0 %      MPV 8.6 fL      Platelets 027 Thousands/uL      nRBC 0 /100 WBCs      Neutrophils Relative 80 %      Immat GRANS % 0 %      Lymphocytes Relative 15 %      Monocytes Relative 5 %      Eosinophils Relative 0 %      Basophils Relative 0 %      Neutrophils Absolute 6.29 Thousands/µL      Immature Grans Absolute 0.02 Thousand/uL      Lymphocytes Absolute 1.17 Thousands/µL      Monocytes Absolute 0.42 Thousand/µL      Eosinophils Absolute 0.02 Thousand/µL      Basophils Absolute 0.02 Thousands/µL                    X-ray chest 1 view portable   Final Result by Chandrakant Hurst MD (11/23 1256)      No acute cardiopulmonary disease. Workstation performed: [id number]                    Procedures  Procedures         ED Course             HEART Risk Score      Flowsheet Row Most Recent Value   Heart Score Risk Calculator    History 0 Filed at: 11/23/2023 1343   ECG 0 Filed at: 11/23/2023 1343   Age 0 Filed at: 11/23/2023 1343   Risk Factors 0 Filed at: 11/23/2023 1343   Troponin 0 Filed at: 11/23/2023 1343   HEART Score 0 Filed at: 11/23/2023 1343                          SBIRT 20yo+      Flowsheet Row Most Recent Value   Initial Alcohol Screen: US AUDIT-C     1. How often do you have a drink containing alcohol? 1 Filed at: 11/23/2023 1223   2. How many drinks containing alcohol do you have on a typical day you are drinking? 0 Filed at: 11/23/2023 1223   3a. Male UNDER 65: How often do you have five or more drinks on one occasion? 0 Filed at: 11/23/2023 1223   3b. FEMALE Any Age, or MALE 65+: How often do you have 4 or more drinks on one occassion?  0 Filed at: 11/23/2023 1223   Audit-C Score 1 Filed at: 11/23/2023 1223   GUMARO: How many times in the past year have you. .. Used an illegal drug or used a prescription medication for non-medical reasons? Never Filed at: 11/23/2023 1223                      Medical Decision Making  Nonconcerning workup for ACS, low heart score. Patient's pain origin is likely not cardiac but rather musculoskeletal or GI follow-up with primary care. Amount and/or Complexity of Data Reviewed  Labs: ordered. Radiology: ordered. Risk  Prescription drug management. Disposition  Final diagnoses:   Chest pain, unspecified type     Time reflects when diagnosis was documented in both MDM as applicable and the Disposition within this note       Time User Action Codes Description Comment    11/23/2023  1:16 PM Sunil Yu Add [R07.9] Chest pain, unspecified type           ED Disposition       ED Disposition   Discharge    Condition   Stable    Date/Time   Thu Nov 23, 2023 [address] discharge to home/self care. Follow-up Information       Follow up With Specialties Details Why Zara Pressley MD Internal Medicine, Hospice Services, Palliative Care Schedule an appointment as soon as possible for a visit  For [address] [place] [address]  [phone]              Discharge Medication List as of 11/23/2023  1:16 PM        CONTINUE these medications which have NOT CHANGED    Details   multivitamin (THERAGRAN) TABS Take 1 tablet by mouth daily, Historical Med             No discharge procedures on file.     PDMP Review       None            ED Provider  Electronically Signed by             Sunil Yu [address] [place] [address]  11/23/23 0391

## 2023-11-24 LAB
ATRIAL RATE: 75 BPM
P AXIS: 60 DEGREES
PR INTERVAL: 142 MS
QRS AXIS: 48 DEGREES
QRSD INTERVAL: 100 MS
QT INTERVAL: 398 MS
QTC INTERVAL: 444 MS
T WAVE AXIS: 58 DEGREES
VENTRICULAR RATE: 75 BPM

## 2023-11-24 PROCEDURE — 93010 ELECTROCARDIOGRAM REPORT: CPT | Performed by: INTERNAL MEDICINE

## 2023-12-01 ENCOUNTER — RA CDI HCC (OUTPATIENT)
Dept: OTHER | Facility: HOSPITAL | Age: 33
End: 2023-12-01

## 2023-12-07 ENCOUNTER — OFFICE VISIT (OUTPATIENT)
Age: 33
End: 2023-12-07
Payer: MEDICARE

## 2023-12-07 VITALS
RESPIRATION RATE: 17 BRPM | HEART RATE: 96 BPM | OXYGEN SATURATION: 99 % | SYSTOLIC BLOOD PRESSURE: 115 MMHG | BODY MASS INDEX: 29.4 KG/M2 | WEIGHT: 194 LBS | HEIGHT: 68 IN | DIASTOLIC BLOOD PRESSURE: 60 MMHG

## 2023-12-07 DIAGNOSIS — G47.33 OSA (OBSTRUCTIVE SLEEP APNEA): ICD-10-CM

## 2023-12-07 DIAGNOSIS — F20.0 CHRONIC PARANOID SCHIZOPHRENIA (HCC): ICD-10-CM

## 2023-12-07 DIAGNOSIS — K21.9 GASTROESOPHAGEAL REFLUX DISEASE WITHOUT ESOPHAGITIS: ICD-10-CM

## 2023-12-07 DIAGNOSIS — R00.2 PALPITATIONS: ICD-10-CM

## 2023-12-07 DIAGNOSIS — Z23 ENCOUNTER FOR IMMUNIZATION: ICD-10-CM

## 2023-12-07 DIAGNOSIS — Z13.0 SCREENING FOR DEFICIENCY ANEMIA: Primary | ICD-10-CM

## 2023-12-07 DIAGNOSIS — R73.01 IMPAIRED FASTING GLUCOSE: ICD-10-CM

## 2023-12-07 DIAGNOSIS — Z12.11 ENCOUNTER FOR SCREENING FOR MALIGNANT NEOPLASM OF COLON: ICD-10-CM

## 2023-12-07 DIAGNOSIS — Z13.220 SCREENING FOR LIPID DISORDERS: ICD-10-CM

## 2023-12-07 DIAGNOSIS — R53.83 OTHER FATIGUE: ICD-10-CM

## 2023-12-07 PROCEDURE — G0439 PPPS, SUBSEQ VISIT: HCPCS

## 2023-12-07 PROCEDURE — G0008 ADMIN INFLUENZA VIRUS VAC: HCPCS

## 2023-12-07 PROCEDURE — 99214 OFFICE O/P EST MOD 30 MIN: CPT

## 2023-12-07 PROCEDURE — 90686 IIV4 VACC NO PRSV 0.5 ML IM: CPT

## 2023-12-07 RX ORDER — OLANZAPINE 15 MG/1
15 TABLET ORAL DAILY
COMMUNITY
Start: 2023-11-30

## 2023-12-07 RX ORDER — PALIPERIDONE PALMITATE 234 MG/1.5ML
INJECTION INTRAMUSCULAR
COMMUNITY
Start: 2023-11-13 | End: 2023-12-07

## 2023-12-07 NOTE — PATIENT INSTRUCTIONS
ARELI (obstructive sleep apnea)  Stopped using CPAP around 2 months ago due to non compliance. Discussed with patient risks of uncontrolled sleep apnea including irregular heart rhythm. Unclear if uncontrolled sleep apnea is because of his palpitations. Did discuss with patient the options of mandibular devices patient will consider  Palpitations  Follow-up from ER from 11/23. Does continue with intermittent "palpitations at least daily. Heart rate is normal today no irregularity or murmur noted. He does admit to 2 cups of caffeine per day. Will check echo and Holter  Patient instructed to follow-up in 2 weeks to review test or sooner if symptoms progress  Schizophrenia  [facility]. Feels stable at this time  Gastroesophageal reflux disease without esophagitis  Limit spicy and acidic foods. Overall stable  BMI 29.50  No formal exercise at this time. Increase your eating of fresh fruits and vegetables and lean meats. Drink plenty of water at least 60 to 70 ounces per day  NAFLD  Work on weight loss. Limit carbohydrates such as bread, rice, pasta in your diet. Did discuss increasing physical activity daily. Avoid fried fatty foods  Encounter for screening for malignant neoplasm of colon  - Ambulatory Referral to Gastroenterology; Future  Flu vaccine provided today in the office          Medicare Preventive Visit Patient Instructions  Thank you for completing your Welcome to Medicare Visit or Medicare Annual Wellness Visit today. Your next wellness visit will be due in one year (12/7/2024). The screening/preventive services that you may require over the next 5-10 years are detailed below. Some tests may not apply to you based off risk factors and/or age. Screening tests ordered at today's visit but not completed yet may show as past due. Also, please note that scanned in results may not display below.   Preventive Screenings:  Service Recommendations Previous Testing/Comments   Colorectal Cancer Screening  Colonoscopy    Fecal Occult Blood Test (FOBT)/Fecal Immunochemical Test (FIT)  Fecal DNA/Cologuard Test  Flexible Sigmoidoscopy Age: 43-73 years old   Colonoscopy: every 10 years (May be performed more frequently if at higher risk)  OR  FOBT/FIT: every 1 year  OR  Cologuard: every 3 years  OR  Sigmoidoscopy: every 5 years  Screening may be recommended earlier than age 39 if at higher risk for colorectal cancer. Also, an individualized decision between you and your healthcare provider will decide whether screening between the ages of 77-80 would be appropriate. Colonoscopy: 09/27/2017  FOBT/FIT: Not on file  Cologuard: Not on file  Sigmoidoscopy: Not on file          Prostate Cancer Screening Individualized decision between patient and health care provider in men between ages of 53-66   Medicare will cover every 12 months beginning on the day after your 50th birthday PSA: No results in last 5 years     Screening Not Indicated     Hepatitis C Screening Once for adults born between 1945 and 1965  More frequently in patients at high risk for Hepatitis C Hep C Antibody: 02/27/2021    Screening Current   Diabetes Screening 1-2 times per year if you're at risk for diabetes or have pre-diabetes Fasting glucose: 112 mg/dL (3/17/2023)  A1C: 5.8 % (3/17/2023)  Screening Current   Cholesterol Screening Once every 5 years if you don't have a lipid disorder. May order more often based on risk factors. Lipid panel: 11/23/2023  Screening Not Indicated  History Lipid Disorder      Other Preventive Screenings Covered by Medicare:  Abdominal Aortic Aneurysm (AAA) Screening: covered once if your at risk. You're considered to be at risk if you have a family history of AAA or a male between the age of 70-76 who smoking at least 100 cigarettes in your lifetime.   Lung Cancer Screening: covers low dose CT scan once per year if you meet all of the following conditions: (1) Age 48-67; (2) No signs or symptoms of lung cancer; (3) Current smoker or have quit smoking within the last 15 years; (4) You have a tobacco smoking history of at least 20 pack years (packs per day x number of years you smoked); (5) You get a written order from a healthcare provider. Glaucoma Screening: covered annually if you're considered high risk: (1) You have diabetes OR (2) Family history of glaucoma OR (3)  aged 48 and older OR (3)  American aged 72 and older  Osteoporosis Screening: covered every 2 years if you meet one of the following conditions: (1) Have a vertebral abnormality; (2) On glucocorticoid therapy for more than 3 months; (3) Have primary hyperparathyroidism; (4) On osteoporosis medications and need to assess response to drug therapy. HIV Screening: covered annually if you're between the age of 14-79. Also covered annually if you are younger than 13 and older than 72 with risk factors for HIV infection. For pregnant patients, it is covered up to 3 times per pregnancy.     Immunizations:  Immunization Recommendations   Influenza Vaccine Annual influenza vaccination during flu season is recommended for all persons aged >= 6 months who do not have contraindications   Pneumococcal Vaccine   * Pneumococcal conjugate vaccine = PCV13 (Prevnar 13), PCV15 (Vaxneuvance), PCV20 (Prevnar 20)  * Pneumococcal polysaccharide vaccine = PPSV23 (Pneumovax) Adults 38-61 yo with certain risk factors or if 69+ yo  If never received any pneumonia vaccine: recommend Prevnar 20 (PCV20)  Give PCV20 if previously received 1 dose of PCV13 or PPSV23   Hepatitis B Vaccine 3 dose series if at intermediate or high risk (ex: diabetes, end stage renal disease, liver disease)   Respiratory syncytial virus (RSV) Vaccine - COVERED BY MEDICARE PART D  * RSVPreF3 (Arexvy) CDC recommends that adults 61years of age and older may receive a single dose of RSV vaccine using shared clinical decision-making (SCDM)   Tetanus (Td) Vaccine - COST NOT COVERED BY MEDICARE PART B Following completion of primary series, a booster dose should be given every 10 years to maintain immunity against tetanus. Td may also be given as tetanus wound prophylaxis. Tdap Vaccine - COST NOT COVERED BY MEDICARE PART B Recommended at least once for all adults. For pregnant patients, recommended with each pregnancy. Shingles Vaccine (Shingrix) - COST NOT COVERED BY MEDICARE PART B  2 shot series recommended in those 19 years and older who have or will have weakened immune systems or those 50 years and older     Health Maintenance Due:      Topic Date Due    Colorectal Cancer Screening  09/27/2022    HIV Screening  Completed    Hepatitis C Screening  Completed     Immunizations Due:      Topic Date Due    Hepatitis A Vaccine (2 of 2 - 2-dose series) 01/29/1998    COVID-19 Vaccine (4 - Pfizer series) 05/20/2022    Influenza Vaccine (1) 09/01/2023     Advance Directives   What are advance directives? Advance directives are legal documents that state your wishes and plans for medical care. These plans are made ahead of time in case you lose your ability to make decisions for yourself. Advance directives can apply to any medical decision, such as the treatments you want, and if you want to donate organs. What are the types of advance directives? There are many types of advance directives, and each state has rules about how to use them. You may choose a combination of any of the following:  Living will: This is a written record of the treatment you want. You can also choose which treatments you do not want, which to limit, and which to stop at a certain time. This includes surgery, medicine, IV fluid, and tube feedings. Durable power of  for healthcare [facility]): This is a written record that states who you want to make healthcare choices for you when you are unable to make them for yourself. This person, called a proxy, is usually a family member or a friend.  You may choose more than 1 proxy. Do not resuscitate (DNR) order:  A DNR order is used in case your heart stops beating or you stop breathing. It is a request not to have certain forms of treatment, such as CPR. A DNR order may be included in other types of advance directives. Medical directive: This covers the care that you want if you are in a coma, near death, or unable to make decisions for yourself. You can list the treatments you want for each condition. Treatment may include pain medicine, surgery, blood transfusions, dialysis, IV or tube feedings, and a ventilator (breathing machine). Values history: This document has questions about your views, beliefs, and how you feel and think about life. This information can help others choose the care that you would choose. Why are advance directives important? An advance directive helps you control your care. Although spoken wishes may be used, it is better to have your wishes written down. Spoken wishes can be misunderstood, or not followed. Treatments may be given even if you do not want them. An advance directive may make it easier for your family to make difficult choices about your care. Weight Management   Why it is important to manage your weight:  Being overweight increases your risk of health conditions such as heart disease, high blood pressure, type 2 diabetes, and certain types of cancer. It can also increase your risk for osteoarthritis, sleep apnea, and other respiratory problems. Aim for a slow, steady weight loss. Even a small amount of weight loss can lower your risk of health problems. How to lose weight safely:  A safe and healthy way to lose weight is to eat fewer calories and get regular exercise. You can lose up about 1 pound a week by decreasing the number of calories you eat by 500 calories each day. Healthy meal plan for weight management:  A healthy meal plan includes a variety of foods, contains fewer calories, and helps you stay healthy.  A healthy meal plan includes the following:  Eat whole-grain foods more often. A healthy meal plan should contain fiber. Fiber is the part of grains, fruits, and vegetables that is not broken down by your body. Whole-grain foods are healthy and provide extra fiber in your diet. Some examples of whole-grain foods are whole-wheat breads and pastas, oatmeal, brown rice, and bulgur. Eat a variety of vegetables every day. Include dark, leafy greens such as spinach, kale, paul greens, and mustard greens. Eat yellow and orange vegetables such as carrots, sweet potatoes, and winter squash. Eat a variety of fruits every day. Choose fresh or canned fruit (canned in its own juice or light syrup) instead of juice. Fruit juice has very little or no fiber. Eat low-fat dairy foods. Drink fat-free (skim) milk or 1% milk. Eat fat-free yogurt and low-fat cottage cheese. Try low-fat cheeses such as mozzarella and other reduced-fat cheeses. Choose meat and other protein foods that are low in fat. Choose beans or other legumes such as split peas or lentils. Choose fish, skinless poultry (chicken or turkey), or lean cuts of red meat (beef or pork). Before you cook meat or poultry, cut off any visible fat. Use less fat and oil. Try baking foods instead of frying them. Add less fat, such as margarine, sour cream, regular salad dressing and mayonnaise to foods. Eat fewer high-fat foods. Some examples of high-fat foods include french fries, doughnuts, ice cream, and cakes. Eat fewer sweets. Limit foods and drinks that are high in sugar. This includes candy, cookies, regular soda, and sweetened drinks. Exercise:  Exercise at least 30 minutes per day on most days of the week. Some examples of exercise include walking, biking, dancing, and swimming. You can also fit in more physical activity by taking the stairs instead of the elevator or parking farther away from stores.  Ask your healthcare provider about the best exercise plan for you.   Narcotic (Opioid) Safety    Use narcotics safely:  Take prescribed narcotics exactly as directed  Do not give narcotics to others or take narcotics that belong to someone else  Do not mix narcotics without medicines or alcohol  Do not drive or operate heavy machinery after you take the narcotic  Monitor for side effects and notify your healthcare provider if you experienced side effects such as nausea, sleepiness, itching, or trouble thinking clearly. Manage constipation:    Constipation is the most common side effect of narcotic medicine. Constipation is when you have hard, dry bowel movements, or you go longer than usual between bowel movements. Tell your healthcare provider about all changes in your bowel movements while you are taking narcotics. He or she may recommend laxative medicine to help you have a bowel movement. He or she may also change the kind of narcotic you are taking, or change when you take it. The following are more ways you can prevent or relieve constipation:    Drink liquids as directed. You may need to drink extra liquids to help soften and move your bowels. Ask how much liquid to drink each day and which liquids are best for you. Eat high-fiber foods. This may help decrease constipation by adding bulk to your bowel movements. High-fiber foods include fruits, vegetables, whole-grain breads and cereals, and beans. Your healthcare provider or dietitian can help you create a high-fiber meal plan. Your provider may also recommend a fiber supplement if you cannot get enough fiber from food. Exercise regularly. Regular physical activity can help stimulate your intestines. Walking is a good exercise to prevent or relieve constipation. Ask which exercises are best for you. Schedule a time each day to have a bowel movement. This may help train your body to have regular bowel movements. Bend forward while you are on the toilet to help move the bowel movement out.  Sit on the toilet for at least 10 minutes, even if you do not have a bowel movement. Store narcotics safely:   Store narcotics where others cannot easily get them. Keep them in a locked cabinet or secure area. Do not  keep them in a purse or other bag you carry with you. A person may be looking for something else and find the narcotics. Make sure narcotics are stored out of the reach of children. A child can easily overdose on narcotics. Narcotics may look like candy to a small child. The best way to dispose of narcotics: The laws vary by country and area. In the [facility], the best way is to return the narcotics through a take-back program. This program is offered by the [employer]). The following are options for using the program:  Take the narcotics to a [facility] [employer] collection site. The site is often a law enforcement center. Call your local law enforcement center for scheduled take-back days in your area. You will be given information on where to go if the collection site is in a different location. Take the narcotics to an approved pharmacy or hospital.  A pharmacy or hospital may be set up as a collection site. You will need to ask if it is a [employer] [employer] collection site if you were not directed there. A pharmacy or doctor's office may not be able to take back narcotics unless it is a [employer] site. Use a mail-back system. This means you are given containers to put the narcotics into. You will then mail them in the containers. Use a take-back drop box. This is a place to leave the narcotics at any time. People and animals will not be able to get into the box. Your local law enforcement agency can tell you where to find a drop box in your area. Other ways to manage pain:   Ask your healthcare provider about non-narcotic medicines to control pain. Nonprescription medicines include NSAIDs (such as ibuprofen) and acetaminophen.  Prescription medicines include muscle relaxers, antidepressants, and steroids. Pain may be managed without any medicines. Some ways to relieve pain include massage, aromatherapy, or meditation. Physical or occupational therapy may also help. For more information:   Drug Enforcement Administration  [address] [facility] , [address]  Phone: 4- 291 - [phone]  Web Address: [place]/drug_disposal/    [address] [place] [address]  Phone: 5- 371 - 221-1699  Web Address: http://[employer]/     © Copyright [employer] 2018 Information is for End User's use only and may not be sold, redistributed or otherwise used for commercial purposes.  All illustrations and images included in CareNotes® are the copyrighted property of [employer] or [address] [place] [address]

## 2023-12-07 NOTE — PROGRESS NOTES
Assessment and Plan:   ARELI (obstructive sleep apnea)  Stopped using CPAP around 2 months ago due to non compliance. Discussed with patient risks of uncontrolled sleep apnea including irregular heart rhythm. Unclear if uncontrolled sleep apnea is because of his palpitations. Did discuss with patient the options of mandibular devices patient will consider    Palpitations  Follow-up from ER from 11/23. Does continue with intermittent "palpitations at least daily. Heart rate is normal today no irregularity or murmur noted. He does admit to 2 cups of caffeine per day. Will check echo and Holter  Patient instructed to follow-up in 2 weeks to review test or sooner if symptoms progress    Schizophrenia  [facility]. Feels stable at this time    Gastroesophageal reflux disease without esophagitis  Limit spicy and acidic foods. Overall stable    BMI 29.50  No formal exercise at this time. Increase your eating of fresh fruits and vegetables and lean meats. Drink plenty of water at least 60 to 70 ounces per day    NAFLD  Work on weight loss. Limit carbohydrates such as bread, rice, pasta in your diet. Did discuss increasing physical activity daily. Avoid fried fatty foods    Encounter for screening for malignant neoplasm of colon  - Ambulatory Referral to Gastroenterology;  Future    Flu vaccine provided today in the office      Problem List Items Addressed This Visit       Gastroesophageal reflux disease    Impaired fasting glucose    Relevant Orders    Comprehensive metabolic panel    Hemoglobin A1C    ARELI (obstructive sleep apnea)    Chronic paranoid schizophrenia (HCC)    Relevant Medications    OLANZapine (ZyPREXA) 15 mg tablet     Other Visit Diagnoses       Screening for deficiency anemia    -  Primary    Relevant Orders    CBC and differential    Screening for lipid disorders        Relevant Orders    Lipid panel    Other fatigue        Relevant Orders    TSH, 3rd generation with Free T4 reflex    Encounter for screening for malignant neoplasm of colon        Relevant Orders    Ambulatory Referral to Gastroenterology    Palpitations        Relevant Orders    Holter monitor    Echo complete w/ contrast if indicated    Encounter for immunization        Relevant Orders    influenza vaccine, quadrivalent, 0.5 mL, preservative-free, for adult and pediatric patients 6 mos+ (AFLURIA, FLUARIX, FLULAVAL, FLUZONE) (Completed)          BMI Counseling: Body mass index is 29.5 kg/m². The BMI is above normal. Nutrition recommendations include decreasing portion sizes, encouraging healthy choices of fruits and vegetables, consuming healthier snacks, limiting drinks that contain sugar, moderation in carbohydrate intake, reducing intake of saturated and trans fat and reducing intake of cholesterol. Exercise recommendations include exercising 3-5 times per week. No pharmacotherapy was ordered. Rationale for BMI follow-up plan is due to patient being overweight or obese. Preventive health issues were discussed with patient, and age appropriate screening tests were ordered as noted in patient's After Visit Summary. Personalized health advice and appropriate referrals for health education or preventive services given if needed, as noted in patient's After Visit Summary. History of Present Illness:     Patient presents for a Medicare Wellness Visit    Carmen Zamora is a 67-year-old male patient with a past medical history significant for GERD, hyperlipidemia, obesity, schizophrenia, nonalcoholic fatty liver disease, and ARELI. He presents today in the office for a wellness visit as well as to discuss the emergency room visit he recently had due to palpitations and chest pain. On 1123 he went to the emergency room due to midsternal chest discomfort and palpitations. His workup was negative and he was sent home.   His psychiatric provider did discontinue Invega and restarted him back on Zyprexa and consideration of this being the cause. Patient continues to have symptoms of "palpitations. He denies any chest pain or shortness of breath at this time. Denies any dizziness or vertigo. He does have a history of sleep apnea in approximately 2 months ago sent back his CPAP device due to noncompliance. He does feel as though since he quit smoking he has stopped "snoring". His mother who was present during this visit does admit she hears him occasionally snoring as well as an episode or 2 of apnea if he falls asleep on the couch. Overall he is feeling well mental health wise and follows with [facility]. He does not get any formal exercise and does not follow a strict diet at this time. Patient Care Team:  Sujata Walters MD as PCP - MD Bang Rousseau MD as Endoscopist     Review of Systems:     Review of Systems   Constitutional:  Negative for appetite change, chills, diaphoresis, fatigue, fever and unexpected weight change. HENT:  Negative for postnasal drip and sneezing. Eyes:  Negative for visual disturbance. Respiratory:  Negative for chest tightness and shortness of breath. Cardiovascular:  Positive for palpitations. Negative for chest pain and leg swelling. Gastrointestinal:  Negative for abdominal pain and blood in stool. Endocrine: Negative for cold intolerance, heat intolerance, polydipsia, polyphagia and polyuria. Genitourinary:  Negative for difficulty urinating, dysuria, frequency and urgency. Musculoskeletal:  Negative for arthralgias and myalgias. Skin:  Negative for rash and wound. Neurological:  Negative for dizziness, weakness, light-headedness and headaches. Hematological:  Negative for adenopathy. Psychiatric/Behavioral:  Negative for confusion, dysphoric mood and sleep disturbance. The patient is not nervous/anxious.          Problem List:     Patient Active Problem List   Diagnosis    Gastroesophageal reflux disease Hyperlipidemia    Impaired fasting glucose    Morbid obesity due to excess calories (HCC)    Schizophrenia (HCC)    Non-alcoholic fatty liver disease    Toe swelling    Psychiatric disorder    MRSA (methicillin resistant staph aureus) culture positive    Tobacco abuse    ARELI (obstructive sleep apnea)    Sleep related hypoxia    Dyslipidemia (high LDL; low HDL)    Chronic paranoid schizophrenia ([address])      Past Medical and Surgical History:     Past Medical History:   Diagnosis Date    GERD (gastroesophageal reflux disease)     H. pylori infection     Hematochezia     Hypercholesteremia     Hyperlipidemia     Psychosis ([address])     Last Assessed:  11/19/15    Schizophrenia ([address])      Past Surgical History:   Procedure Laterality Date    COLONOSCOPY      ID COLONOSCOPY FLX DX W/COLLJ SPEC WHEN PFRMD N/A 2017    Procedure: COLONOSCOPY;  Surgeon: Augustus Mackenzie MD;  Location: [facility];   Service: Gastroenterology      Family History:     Family History   Problem Relation Age of Onset    Hypertension Family     Cancer Family       Social History:     Social History     Socioeconomic History    Marital status: Single     Spouse name: None    Number of children: None    Years of education: None    Highest education level: None   Occupational History    None   Tobacco Use    Smoking status: Former     Packs/day: 0.25     Types: Cigarettes     Quit date: 2023     Years since quittin.6    Smokeless tobacco: Never    Tobacco comments:     2-3 cigarettes a day   Vaping Use    Vaping Use: Never used   Substance and Sexual Activity    Alcohol use: Yes     Comment: occ    Drug use: No    Sexual activity: None   Other Topics Concern    None   Social History Narrative    None     Social Determinants of Health     Financial Resource Strain: Low Risk  (2023)    Overall Financial Resource Strain (CARDIA)     Difficulty of Paying Living Expenses: Not hard at all   Food Insecurity: Not on file   Transportation Needs: No Transportation Needs (12/7/2023)    PRAPARE - Transportation     Lack of Transportation (Medical): No     Lack of Transportation (Non-Medical): No   Physical Activity: Inactive (3/15/2021)    Exercise Vital Sign     Days of Exercise per Week: 0 days     Minutes of Exercise per Session: 0 min   Stress: No Stress Concern Present (3/15/2021)    [address] [facility]     Feeling of Stress : Not at all   Social Connections: Not on file   Intimate Partner Violence: Not on file   Housing Stability: Not on file      Medications and Allergies:     Current Outpatient Medications   Medication Sig Dispense Refill    multivitamin (THERAGRAN) TABS Take 1 tablet by mouth daily      OLANZapine (ZyPREXA) 15 mg tablet Take 15 mg by mouth daily       No current facility-administered medications for this visit.      No Known Allergies   Immunizations:     Immunization History   Administered Date(s) Administered    COVID-19 PFIZER VACCINE 0.3 ML IM 07/09/2021, 07/30/2021    COVID-19 Pfizer vac (Hi-sucrose, gray cap) 12 yr+ IM 03/25/2022    DTP 1991, 1991, 1991, 11/12/1992, 11/01/1997    Hep B, Adolescent or Pediatric 07/29/1997, 09/02/2004, 04/07/2005    Hepatitis A 07/29/1997    HiB 11/02/1992    INFLUENZA 11/19/2015, 10/31/2017, 10/13/2022    Influenza Quadrivalent, 6-35 Months IM 11/19/2015, 10/31/2017    Influenza, injectable, quadrivalent, preservative free 0.5 mL 09/21/2021, 10/13/2022, 12/07/2023    MMR 04/15/1992, 09/02/2004    OPV 1991, 1991, 1991, 11/02/1992, 11/01/1997    Td (adult), adsorbed 06/25/2004    Tdap 03/15/2021    Varicella 09/02/2004, 04/07/2005      Health Maintenance:         Topic Date Due    Colorectal Cancer Screening  09/27/2022    HIV Screening  Completed    Hepatitis C Screening  Completed         Topic Date Due    Hepatitis A Vaccine (2 of 2 - 2-dose series) 01/29/1998    COVID-19 Vaccine (4 - Pfizer series) 05/20/2022      Medicare Screening Tests and Risk Assessments:     Guerline Juarez is here for his Subsequent Wellness visit. Health Risk Assessment:   Patient rates overall health as very good. Patient feels that their physical health rating is same. Patient is very satisfied with their life. Eyesight was rated as same. Hearing was rated as same. Patient feels that their emotional and mental health rating is same. Patients states they are never, rarely angry. Patient states they are never, rarely unusually tired/fatigued. Pain experienced in the last 7 days has been none. Patient states that he has experienced no weight loss or gain in last 6 months. Fall Risk Screening: In the past year, patient has experienced: no history of falling in past year      Home Safety:  Patient does not have trouble with stairs inside or outside of their home. Patient has working smoke alarms and has working carbon monoxide detector. Home safety hazards include: none. Nutrition:   Current diet is Regular. Medications:   Patient is currently taking over-the-counter supplements. OTC medications include: see medication list. Patient is able to manage medications. Activities of Daily Living (ADLs)/Instrumental Activities of Daily Living (IADLs):   Walk and transfer into and out of bed and chair?: Yes  Dress and groom yourself?: Yes    Bathe or shower yourself?: Yes    Feed yourself? Yes  Do your laundry/housekeeping?: Yes  Manage your money, pay your bills and track your expenses?: Yes  Make your own meals?: Yes    Do your own shopping?: Yes    Previous Hospitalizations:   Any hospitalizations or ED visits within the last 12 months?: Yes      Hospitalization Comments: 8/11/23 patient stopped taking medication    Advance Care Planning:   Living will: No    Durable POA for healthcare: No    Advanced directive: No    Advanced directive counseling given: Yes    Five wishes given: No    Patient declined ACP directive:  Yes PREVENTIVE SCREENINGS      Cardiovascular Screening:    General: Screening Not Indicated and History Lipid Disorder      Diabetes Screening:     General: Screening Current      Prostate Cancer Screening:    General: Screening Not Indicated      Abdominal Aortic Aneurysm (AAA) Screening:    Risk factors include: tobacco use        Lung Cancer Screening:     General: Screening Not Indicated      Hepatitis C Screening:    General: Screening Current    Screening, Brief Intervention, and Referral to Treatment (SBIRT)    Screening    Typical number of drinks in a week: 6    Single Item Drug Screening:  How often have you used an illegal drug (including marijuana) or a prescription medication for non-medical reasons in the past year? never    Single Item Drug Screen Score: 0  Interpretation: Negative screen for possible drug use disorder    Review of Current Opioid Use    Opioid Risk Tool (ORT) Interpretation: Complete Opioid Risk Tool (ORT)    No results found. Physical Exam:     /60 (BP Location: Left arm, Patient Position: Sitting, Cuff Size: Large)   Pulse 96   Resp 17   Ht 5' 8" (1.727 m)   Wt 88 kg (194 lb)   SpO2 99%   BMI 29.50 kg/m²     Physical Exam  Constitutional:       Appearance: He is well-developed. HENT:      Head: Normocephalic and atraumatic. Eyes:      Conjunctiva/sclera: Conjunctivae normal.      Pupils: Pupils are equal, round, and reactive to light. Cardiovascular:      Rate and Rhythm: Normal rate and regular rhythm. Heart sounds: Normal heart sounds. Pulmonary:      Effort: Pulmonary effort is normal.      Breath sounds: Normal breath sounds. Abdominal:      General: Bowel sounds are normal.      Palpations: Abdomen is soft. Musculoskeletal:         General: Normal range of motion. Cervical back: Normal range of motion. Skin:     General: Skin is warm and dry. Neurological:      Mental Status: He is alert and oriented to person, place, and time. Consuelo Lindsey

## 2023-12-27 ENCOUNTER — APPOINTMENT (OUTPATIENT)
Dept: RADIOLOGY | Facility: HOSPITAL | Age: 33
End: 2023-12-27
Payer: MEDICARE

## 2023-12-27 ENCOUNTER — HOSPITAL ENCOUNTER (EMERGENCY)
Facility: HOSPITAL | Age: 33
Discharge: HOME | End: 2023-12-27
Attending: EMERGENCY MEDICINE
Payer: MEDICARE

## 2023-12-27 VITALS
SYSTOLIC BLOOD PRESSURE: 150 MMHG | OXYGEN SATURATION: 100 % | HEART RATE: 93 BPM | DIASTOLIC BLOOD PRESSURE: 70 MMHG | RESPIRATION RATE: 20 BRPM | TEMPERATURE: 98 F

## 2023-12-27 DIAGNOSIS — R00.0 TACHYCARDIA: ICD-10-CM

## 2023-12-27 DIAGNOSIS — I10 HIGH BLOOD PRESSURE: ICD-10-CM

## 2023-12-27 DIAGNOSIS — R00.2 PALPITATIONS: Primary | ICD-10-CM

## 2023-12-27 LAB
ALBUMIN SERPL BCP-MCNC: 4.8 G/DL (ref 3.5–5)
ALP SERPL-CCNC: 61 U/L (ref 34–104)
ALT SERPL W P-5'-P-CCNC: 41 U/L (ref 7–52)
ANION GAP SERPL CALCULATED.3IONS-SCNC: 9 MMOL/L
AST SERPL W P-5'-P-CCNC: 22 U/L (ref 13–39)
BASOPHILS # BLD AUTO: 0.03 THOUSANDS/ÂΜL (ref 0–0.1)
BASOPHILS NFR BLD AUTO: 0 % (ref 0–1)
BILIRUB SERPL-MCNC: 0.77 MG/DL (ref 0.2–1)
BUN SERPL-MCNC: 10 MG/DL (ref 5–25)
CALCIUM SERPL-MCNC: 10 MG/DL (ref 8.4–10.2)
CARDIAC TROPONIN I PNL SERPL HS: 2 NG/L
CHLORIDE SERPL-SCNC: 104 MMOL/L (ref 96–108)
CO2 SERPL-SCNC: 24 MMOL/L (ref 21–32)
CREAT SERPL-MCNC: 0.86 MG/DL (ref 0.6–1.3)
EOSINOPHIL # BLD AUTO: 0.08 THOUSAND/ÂΜL (ref 0–0.61)
EOSINOPHIL NFR BLD AUTO: 1 % (ref 0–6)
ERYTHROCYTE [DISTWIDTH] IN BLOOD BY AUTOMATED COUNT: 12.3 % (ref 11.6–15.1)
GFR SERPL CREATININE-BSD FRML MDRD: 114 ML/MIN/1.73SQ M
GLUCOSE SERPL-MCNC: 95 MG/DL (ref 65–140)
HCT VFR BLD AUTO: 44.7 % (ref 36.5–49.3)
HGB BLD-MCNC: 15.4 G/DL (ref 12–17)
IMM GRANULOCYTES # BLD AUTO: 0.02 THOUSAND/UL (ref 0–0.2)
IMM GRANULOCYTES NFR BLD AUTO: 0 % (ref 0–2)
LYMPHOCYTES # BLD AUTO: 1.94 THOUSANDS/ÂΜL (ref 0.6–4.47)
LYMPHOCYTES NFR BLD AUTO: 22 % (ref 14–44)
MCH RBC QN AUTO: 29.6 PG (ref 26.8–34.3)
MCHC RBC AUTO-ENTMCNC: 34.5 G/DL (ref 31.4–37.4)
MCV RBC AUTO: 86 FL (ref 82–98)
MONOCYTES # BLD AUTO: 0.59 THOUSAND/ÂΜL (ref 0.17–1.22)
MONOCYTES NFR BLD AUTO: 7 % (ref 4–12)
NEUTROPHILS # BLD AUTO: 6.32 THOUSANDS/ÂΜL (ref 1.85–7.62)
NEUTS SEG NFR BLD AUTO: 70 % (ref 43–75)
NRBC BLD AUTO-RTO: 0 /100 WBCS
PLATELET # BLD AUTO: 262 THOUSANDS/UL (ref 149–390)
PMV BLD AUTO: 8.9 FL (ref 8.9–12.7)
POTASSIUM SERPL-SCNC: 3.6 MMOL/L (ref 3.5–5.3)
PROT SERPL-MCNC: 8.1 G/DL (ref 6.4–8.4)
RBC # BLD AUTO: 5.21 MILLION/UL (ref 3.88–5.62)
SODIUM SERPL-SCNC: 137 MMOL/L (ref 135–147)
WBC # BLD AUTO: 8.98 THOUSAND/UL (ref 4.31–10.16)

## 2023-12-27 PROCEDURE — 99285 EMERGENCY DEPT VISIT HI MDM: CPT

## 2023-12-27 PROCEDURE — 85025 COMPLETE CBC W/AUTO DIFF WBC: CPT | Performed by: EMERGENCY MEDICINE

## 2023-12-27 PROCEDURE — 84484 ASSAY OF TROPONIN QUANT: CPT | Performed by: EMERGENCY MEDICINE

## 2023-12-27 PROCEDURE — 99285 EMERGENCY DEPT VISIT HI MDM: CPT | Performed by: EMERGENCY MEDICINE

## 2023-12-27 PROCEDURE — 80053 COMPREHEN METABOLIC PANEL: CPT | Performed by: EMERGENCY MEDICINE

## 2023-12-27 PROCEDURE — 36415 COLL VENOUS BLD VENIPUNCTURE: CPT | Performed by: EMERGENCY MEDICINE

## 2023-12-27 PROCEDURE — 93005 ELECTROCARDIOGRAM TRACING: CPT

## 2023-12-27 PROCEDURE — 71045 X-RAY EXAM CHEST 1 VIEW: CPT

## 2023-12-27 RX ORDER — HYDROCHLOROTHIAZIDE 25 MG/1
12.5 TABLET ORAL DAILY
Qty: 30 TABLET | Refills: 0 | Status: SHIPPED | OUTPATIENT
Start: 2023-12-27

## 2023-12-28 LAB
ATRIAL RATE: 88 BPM
P AXIS: 66 DEGREES
PR INTERVAL: 150 MS
QRS AXIS: 38 DEGREES
QRSD INTERVAL: 94 MS
QT INTERVAL: 370 MS
QTC INTERVAL: 447 MS
T WAVE AXIS: 46 DEGREES
VENTRICULAR RATE: 88 BPM

## 2023-12-28 NOTE — ED PROVIDER NOTES
History  Chief Complaint   Patient presents with    Palpitations     Pt arrives c/o palpitations and increase BP readings at home today. Pt reports starting zyprexa this month and reports increased symptoms since starting. Denies chest pain, N/V.      Palpitations for the last month since switching from invega to zyprexa. Feels like his heart is racing. No cp, sob, syncope, cough, hemoptysis, leg pain or swelling, h/o recent trauma/surgery/immobilization or h/o VTE. No fever or chills. Additional history from mother at bedside who reports pt had multiple BP readings at home today with SBP above 250, she request rx for an antihypertensive.         Prior to Admission Medications   Prescriptions Last Dose Informant Patient Reported? Taking?   OLANZapine (ZyPREXA) 15 mg tablet   Yes No   Sig: Take 15 mg by mouth daily   multivitamin (THERAGRAN) TABS  Self Yes No   Sig: Take 1 tablet by mouth daily      Facility-Administered Medications: None       Past Medical History:   Diagnosis Date    GERD (gastroesophageal reflux disease)     H. pylori infection     Hematochezia     Hypercholesteremia     Hyperlipidemia     Psychosis (HCC)     Last Assessed:  11/19/15    Schizophrenia (HCC)        Past Surgical History:   Procedure Laterality Date    COLONOSCOPY      MS COLONOSCOPY FLX DX W/COLLJ SPEC WHEN PFRMD N/A 2017    Procedure: COLONOSCOPY;  Surgeon: Filippo Rodgers MD;  Location: [facility];  Service: Gastroenterology       Family History   Problem Relation Age of Onset    Hypertension Family     Cancer Family      I have reviewed and agree with the history as documented.    E-Cigarette/Vaping    E-Cigarette Use Never User      E-Cigarette/Vaping Substances    Nicotine No     THC No     CBD No     Flavoring No     Other No     Unknown No      Social History     Tobacco Use    Smoking status: Former     Current packs/day: 0.00     Types: Cigarettes     Quit date: 2023     Years since quittin.7    Smokeless  tobacco: Never    Tobacco comments:     2-3 cigarettes a day   Vaping Use    Vaping status: Never Used   Substance Use Topics    Alcohol use: Yes     Comment: occ    Drug use: No       Review of Systems   Cardiovascular:  Positive for palpitations.   All other systems reviewed and are negative.      Physical Exam  Physical Exam  Vitals and nursing note reviewed.   Constitutional:       General: He is not in acute distress.     Appearance: Normal appearance. He is well-developed. He is not ill-appearing, toxic-appearing or diaphoretic.   HENT:      Head: Normocephalic and atraumatic.      Mouth/Throat:      Mouth: Mucous membranes are moist.      Pharynx: Oropharynx is clear.   Eyes:      Conjunctiva/sclera: Conjunctivae normal.      Pupils: Pupils are equal, round, and reactive to light.   Neck:      Vascular: No JVD.   Cardiovascular:      Rate and Rhythm: Regular rhythm. Tachycardia present.      Pulses: Normal pulses.      Heart sounds: Normal heart sounds. No murmur heard.     No friction rub. No gallop.   Pulmonary:      Effort: Pulmonary effort is normal. No respiratory distress.      Breath sounds: Normal breath sounds. No stridor. No wheezing or rales.   Abdominal:      General: There is no distension.      Palpations: Abdomen is soft.      Tenderness: There is no abdominal tenderness. There is no guarding or rebound.   Musculoskeletal:         General: No swelling, tenderness, deformity or signs of injury. Normal range of motion.      Cervical back: Normal range of motion and neck supple. No rigidity.   Skin:     General: Skin is warm and dry.      Capillary Refill: Capillary refill takes less than 2 seconds.      Coloration: Skin is not jaundiced or pale.      Findings: No bruising or erythema.   Neurological:      General: No focal deficit present.      Mental Status: He is alert and oriented to person, place, and time.      Cranial Nerves: No cranial nerve deficit.      Sensory: No sensory deficit.       Motor: No weakness or abnormal muscle tone.      Coordination: Coordination normal.      Gait: Gait normal.         Vital Signs  ED Triage Vitals   Temperature Pulse Respirations Blood Pressure SpO2   12/27/23 2026 12/27/23 2025 12/27/23 2025 12/27/23 2025 12/27/23 2025   97.8 °F (36.6 °C) 85 20 135/85 100 %      Temp Source Heart Rate Source Patient Position - Orthostatic VS BP Location FiO2 (%)   12/27/23 2026 12/27/23 2025 12/27/23 2025 12/27/23 2025 --   Oral Monitor Sitting Left arm       Pain Score       12/27/23 2025       No Pain           Vitals:    12/27/23 2025 12/27/23 2221 12/27/23 2300   BP: 135/85 140/87 150/70   Pulse: 85 83 93   Patient Position - Orthostatic VS: Sitting           Visual Acuity  Visual Acuity      Flowsheet Row Most Recent Value   L Pupil Size (mm) 3   R Pupil Size (mm) 3            ED Medications  Medications - No data to display    Diagnostic Studies  Results Reviewed       Procedure Component Value Units Date/Time    HS Troponin 0hr (reflex protocol) [[id number]]  (Normal) Collected: 12/27/23 2034    Lab Status: Final result Specimen: Blood from Arm, Left Updated: 12/27/23 2108     hs TnI 0hr 2 ng/L     Comprehensive metabolic panel [[id number]] Collected: 12/27/23 2034    Lab Status: Final result Specimen: Blood from Arm, Left Updated: 12/27/23 2106     Sodium 137 mmol/L      Potassium 3.6 mmol/L      Chloride 104 mmol/L      CO2 24 mmol/L      ANION GAP 9 mmol/L      BUN 10 mg/dL      Creatinine 0.86 mg/dL      Glucose 95 mg/dL      Calcium 10.0 mg/dL      AST 22 U/L      ALT 41 U/L      Alkaline Phosphatase 61 U/L      Total Protein 8.1 g/dL      Albumin 4.8 g/dL      Total Bilirubin 0.77 mg/dL      eGFR 114 ml/min/1.73sq m     Narrative:      National Kidney Disease Foundation guidelines for Chronic Kidney Disease (CKD):     Stage 1 with normal or high GFR (GFR > 90 mL/min/1.73 square meters)    Stage 2 Mild CKD (GFR = 60-89 mL/min/1.73 square meters)    Stage 3A Moderate CKD  (GFR = 45-59 mL/min/1.73 square meters)    Stage 3B Moderate CKD (GFR = 30-44 mL/min/1.73 square meters)    Stage 4 Severe CKD (GFR = 15-29 mL/min/1.73 square meters)    Stage 5 End Stage CKD (GFR <15 mL/min/1.73 square meters)  Note: GFR calculation is accurate only with a steady state creatinine    CBC and differential [[id number]] Collected: 12/27/23 2034    Lab Status: Final result Specimen: Blood from Arm, Left Updated: 12/27/23 2044     WBC 8.98 Thousand/uL      RBC 5.21 Million/uL      Hemoglobin 15.4 g/dL      Hematocrit 44.7 %      MCV 86 fL      MCH 29.6 pg      MCHC 34.5 g/dL      RDW 12.3 %      MPV 8.9 fL      Platelets 262 Thousands/uL      nRBC 0 /100 WBCs      Neutrophils Relative 70 %      Immat GRANS % 0 %      Lymphocytes Relative 22 %      Monocytes Relative 7 %      Eosinophils Relative 1 %      Basophils Relative 0 %      Neutrophils Absolute 6.32 Thousands/µL      Immature Grans Absolute 0.02 Thousand/uL      Lymphocytes Absolute 1.94 Thousands/µL      Monocytes Absolute 0.59 Thousand/µL      Eosinophils Absolute 0.08 Thousand/µL      Basophils Absolute 0.03 Thousands/µL                    XR chest 1 view portable   ED Interpretation by Naveen Batista MD (12/27 2254)   Normal study.                  Procedures  ECG 12 Lead Documentation Only    Date/Time: 12/27/2023 10:55 PM    Performed by: Naveen Batista MD  Authorized by: Naveen Batista MD    Indications / Diagnosis:  Palpitations  ECG reviewed by me, the ED Provider: yes    Patient location:  ED  Previous ECG:     Previous ECG:  Compared to current    Comparison ECG info:  23 nov 2023  Interpretation:     Interpretation: normal    Rate:     ECG rate:  88    ECG rate assessment: normal    Rhythm:     Rhythm: sinus rhythm    Comments:      Normal EKG.            ED Course                                             Medical Decision Making  Problems Addressed:  Palpitations: complicated acute illness or injury with systemic  symptoms  Tachycardia: complicated acute illness or injury    Amount and/or Complexity of Data Reviewed  Labs: ordered.  Radiology: ordered and independent interpretation performed.    Risk  Prescription drug management.             Disposition  Final diagnoses:   Palpitations   High blood pressure   Tachycardia     Time reflects when diagnosis was documented in both MDM as applicable and the Disposition within this note       Time User Action Codes Description Comment    12/27/2023 11:02 PM Naveen Batista Add [R00.2] Palpitations     12/27/2023 11:02 PM Naveen Batista Add [I10] High blood pressure     12/27/2023 11:02 PM Naveen Batista Add [R00.0] Tachycardia           ED Disposition       ED Disposition   Discharge    Condition   Stable    Date/Time   Wed Dec 27, 2023 11:02 PM    Comment   Jose Herring discharge to home/self care.                   Follow-up Information       Follow up With Specialties Details Why Contact Info Additional Information    [facility] Emergency [facility] Emergency Medicine  If symptoms worsen [address] [facility] [id number] [phone] [facility] Department, [address], [place], [address]            Discharge Medication List as of 12/27/2023 11:03 PM        START taking these medications    Details   hydrochlorothiazide (HYDRODIURIL) 25 mg tablet Take 0.5 tablets (12.5 mg total) by mouth daily, Starting Wed 12/27/2023, Print           CONTINUE these medications which have NOT CHANGED    Details   multivitamin (THERAGRAN) TABS Take 1 tablet by mouth daily, Historical Med      OLANZapine (ZyPREXA) 15 mg tablet Take 15 mg by mouth daily, Starting Thu 11/30/2023, Historical Med             No discharge procedures on file.    PDMP Review       None            ED Provider  Electronically Signed by             Naveen Batista MD  12/28/23 0154

## 2024-01-02 ENCOUNTER — HOSPITAL ENCOUNTER (OUTPATIENT)
Dept: NON INVASIVE DIAGNOSTICS | Facility: HOSPITAL | Age: 34
Discharge: HOME | End: 2024-01-02
Payer: MEDICARE

## 2024-01-02 VITALS
SYSTOLIC BLOOD PRESSURE: 150 MMHG | HEIGHT: 68 IN | DIASTOLIC BLOOD PRESSURE: 70 MMHG | BODY MASS INDEX: 29.4 KG/M2 | WEIGHT: 194 LBS | HEART RATE: 93 BPM

## 2024-01-02 DIAGNOSIS — R00.2 PALPITATIONS: ICD-10-CM

## 2024-01-02 LAB
AORTIC ROOT: 3.6 CM
AORTIC VALVE MEAN VELOCITY: 8.8 M/S
APICAL FOUR CHAMBER EJECTION FRACTION: 75 %
AV LVOT MEAN GRADIENT: 2 MMHG
AV LVOT PEAK GRADIENT: 5 MMHG
AV MEAN GRADIENT: 3 MMHG
AV PEAK GRADIENT: 5 MMHG
AV VELOCITY RATIO: 0.91
DOP CALC AO PEAK VEL: 1.17 M/S
DOP CALC AO VTI: 24.18 CM
DOP CALC LVOT PEAK VEL VTI: 18.42 CM
DOP CALC LVOT PEAK VEL: 1.07 M/S
E WAVE DECELERATION TIME: 125 MS
E/A RATIO: 1.81
FRACTIONAL SHORTENING: 31 (ref 28–44)
INTERVENTRICULAR SEPTUM IN DIASTOLE (PARASTERNAL SHORT AXIS VIEW): 1.1 CM
INTERVENTRICULAR SEPTUM: 1.1 CM (ref 0.6–1.1)
LAAS-AP2: 14.7 CM2
LAAS-AP4: 19.8 CM2
LEFT ATRIUM AREA SYSTOLE SINGLE PLANE A4C: 19.3 CM2
LEFT ATRIUM SIZE: 4.1 CM
LEFT ATRIUM VOLUME (MOD BIPLANE): 56 ML
LEFT ATRIUM VOLUME INDEX (MOD BIPLANE): 27.7 ML/M2
LEFT INTERNAL DIMENSION IN SYSTOLE: 3.3 CM (ref 2.1–4)
LEFT VENTRICULAR INTERNAL DIMENSION IN DIASTOLE: 4.8 CM (ref 3.5–6)
LEFT VENTRICULAR POSTERIOR WALL IN END DIASTOLE: 1.1 CM
LEFT VENTRICULAR STROKE VOLUME: 63 ML
LVSV (TEICH): 63 ML
MV E'TISSUE VEL-LAT: 14 CM/S
MV E'TISSUE VEL-SEP: 15 CM/S
MV PEAK A VEL: 0.53 M/S
MV PEAK E VEL: 96 CM/S
MV STENOSIS PRESSURE HALF TIME: 36 MS
MV VALVE AREA P 1/2 METHOD: 6.11
RIGHT ATRIUM AREA SYSTOLE A4C: 14.1 CM2
RIGHT VENTRICLE ID DIMENSION: 3.5 CM
SL CV LEFT ATRIUM LENGTH A2C: 4.2 CM
SL CV LV EF: 60
SL CV PED ECHO LEFT VENTRICLE DIASTOLIC VOLUME (MOD BIPLANE) 2D: 108 ML
SL CV PED ECHO LEFT VENTRICLE SYSTOLIC VOLUME (MOD BIPLANE) 2D: 45 ML
TRICUSPID ANNULAR PLANE SYSTOLIC EXCURSION: 2.3 CM

## 2024-01-02 PROCEDURE — 93306 TTE W/DOPPLER COMPLETE: CPT | Performed by: INTERNAL MEDICINE

## 2024-01-02 PROCEDURE — 93226 XTRNL ECG REC<48 HR SCAN A/R: CPT

## 2024-01-02 PROCEDURE — 93306 TTE W/DOPPLER COMPLETE: CPT

## 2024-01-02 PROCEDURE — 93225 XTRNL ECG REC<48 HRS REC: CPT

## 2024-01-03 ENCOUNTER — TELEPHONE (OUTPATIENT)
Age: 34
End: 2024-01-03

## 2024-01-05 ENCOUNTER — RA CDI HCC (OUTPATIENT)
Dept: OTHER | Facility: HOSPITAL | Age: 34
End: 2024-01-05

## 2024-01-09 PROCEDURE — 93227 XTRNL ECG REC<48 HR R&I: CPT | Performed by: INTERNAL MEDICINE

## 2024-01-11 ENCOUNTER — OFFICE VISIT (OUTPATIENT)
Age: 34
End: 2024-01-11
Payer: MEDICARE

## 2024-01-11 VITALS
HEIGHT: 68 IN | WEIGHT: 199.8 LBS | HEART RATE: 124 BPM | SYSTOLIC BLOOD PRESSURE: 140 MMHG | RESPIRATION RATE: 17 BRPM | DIASTOLIC BLOOD PRESSURE: 86 MMHG | OXYGEN SATURATION: 98 % | BODY MASS INDEX: 30.28 KG/M2

## 2024-01-11 DIAGNOSIS — R03.0 ELEVATED BLOOD PRESSURE READING: ICD-10-CM

## 2024-01-11 DIAGNOSIS — R00.0 TACHYCARDIA: ICD-10-CM

## 2024-01-11 DIAGNOSIS — Z13.29 SCREENING FOR THYROID DISORDER: ICD-10-CM

## 2024-01-11 DIAGNOSIS — F20.9 SCHIZOPHRENIA, UNSPECIFIED TYPE (HCC): Primary | ICD-10-CM

## 2024-01-11 PROCEDURE — 99214 OFFICE O/P EST MOD 30 MIN: CPT

## 2024-01-11 NOTE — PROGRESS NOTES
INTERNAL MEDICINE FOLLOW-UP VISIT  [facility] - [facility] INTERNAL MEDICINE [address]    NAME: Jose Herring  AGE: 33 y.o. SEX: male  : 1990     DATE: 2024     Assessment and Plan:   1. Schizophrenia, unspecified type (HCC)  Patient was on Zyprexa recently discontinued approximately 2 weeks ago due to symptoms of tachycardia.  Follows with psychiatry and will be seeing them and discussing new medication.  Stable at this time    2. Tachycardia  Holter and echo were stable.  EKG done in the hospital was stable.  Questionable if episodes were caused by Zyprexa.  Now that he has been off this medication for 2 weeks he has shown some decrease in episodes of tachycardia.  Will order a TSH, considering tachycardia may be an result of abnormal TSH versus anxiety    3. Elevated blood pressure reading  140/86 in the office today.  Patient has not been not taking his HCTZ.  Discussed with patient that he needs to start back on his 12.5 mg of HCTZ.  Continue to limit salt in diet to no more than 2000 mg/day    4. Screening for thyroid disorder  Will evaluate due to tachycardia  - TSH, 3rd generation; Future        No follow-ups on file.       Chief Complaint:     Chief Complaint   Patient presents with   • Follow-up     Patient feeling very anxious      History of Present Illness:     Jose is a 33-year-old male patient with a past medical history significant for GERD, ARELI, hyperlipidemia, schizophrenia, elevated BP readings and hyperlipidemia.  He is followed by psychiatry and was recently discontinued on Zyprexa approximately 2 weeks ago due to tachycardia.  He feels that it has improved since he has stopped this medication.  He had a full cardiac workup including echo, Holter and an EKG which were all stable.  He denies any shortness of breath or chest pain.  He does have episodes of anxiety throughout the day.    The following portions of the patient's history were reviewed and updated as  appropriate: allergies, current medications, past family history, past medical history, past social history, past surgical history and problem list.     Review of Systems:     Review of Systems   Constitutional:  Negative for appetite change, chills, diaphoresis, fatigue, fever and unexpected weight change.   HENT:  Negative for postnasal drip and sneezing.    Eyes:  Negative for visual disturbance.   Respiratory:  Negative for chest tightness and shortness of breath.    Cardiovascular:  Positive for palpitations. Negative for chest pain and leg swelling.   Gastrointestinal:  Negative for abdominal pain and blood in stool.   Endocrine: Negative for cold intolerance, heat intolerance, polydipsia, polyphagia and polyuria.   Genitourinary:  Negative for difficulty urinating, dysuria, frequency and urgency.   Musculoskeletal:  Negative for arthralgias and myalgias.   Skin:  Negative for rash and wound.   Neurological:  Negative for dizziness, weakness, light-headedness and headaches.   Hematological:  Negative for adenopathy.   Psychiatric/Behavioral:  Negative for confusion, dysphoric mood and sleep disturbance. The patient is not nervous/anxious.         Past Medical History:     Past Medical History:   Diagnosis Date   • GERD (gastroesophageal reflux disease)    • H. pylori infection    • Hematochezia    • Hypercholesteremia    • Hyperlipidemia    • Psychosis (HCC)     Last Assessed:  11/19/15   • Schizophrenia (HCC)         Current Medications:     Current Outpatient Medications:   •  hydrochlorothiazide (HYDRODIURIL) 25 mg tablet, Take 0.5 tablets (12.5 mg total) by mouth daily, Disp: 30 tablet, Rfl: 0  •  multivitamin (THERAGRAN) TABS, Take 1 tablet by mouth daily, Disp: , Rfl:   •  OLANZapine (ZyPREXA) 15 mg tablet, Take 15 mg by mouth daily, Disp: , Rfl:      Allergies:   No Known Allergies     Physical Exam:     /86 (BP Location: Left arm, Patient Position: Sitting, Cuff Size: Adult)   Pulse (!) 124    "Resp 17   Ht 5' 8\" (1.727 m)   Wt 90.6 kg (199 lb 12.8 oz)   SpO2 98%   BMI 30.38 kg/m²     Physical Exam  Constitutional:       Appearance: He is well-developed.   HENT:      Head: Normocephalic and atraumatic.   Eyes:      Conjunctiva/sclera: Conjunctivae normal.      Pupils: Pupils are equal, round, and reactive to light.   Cardiovascular:      Rate and Rhythm: Normal rate and regular rhythm.      Heart sounds: Normal heart sounds.   Pulmonary:      Effort: Pulmonary effort is normal.      Breath sounds: Normal breath sounds.   Abdominal:      General: Bowel sounds are normal.      Palpations: Abdomen is soft.   Musculoskeletal:         General: Normal range of motion.      Cervical back: Normal range of motion.   Skin:     General: Skin is warm and dry.   Neurological:      Mental Status: He is alert and oriented to person, place, and time.           Data:     Laboratory Results: I have personally reviewed the pertinent laboratory results/reports   Radiology/Other Diagnostic Testing Results: I have personally reviewed pertinent reports.      KANNAN Lake  [facility] INTERNAL MEDICINE [address]  "

## 2024-01-15 ENCOUNTER — TELEPHONE (OUTPATIENT)
Age: 34
End: 2024-01-15

## 2024-01-15 NOTE — TELEPHONE ENCOUNTER
Patient calling and requested to speak with Anne, would not give me any details on why he was calling and just asked to have Anne call him back, please advise

## 2024-02-02 ENCOUNTER — HOSPITAL ENCOUNTER (EMERGENCY)
Facility: HOSPITAL | Age: 34
Discharge: HOME | End: 2024-02-02
Attending: EMERGENCY MEDICINE
Payer: MEDICARE

## 2024-02-02 ENCOUNTER — APPOINTMENT (EMERGENCY)
Dept: CT IMAGING | Facility: HOSPITAL | Age: 34
End: 2024-02-02
Payer: MEDICARE

## 2024-02-02 ENCOUNTER — APPOINTMENT (EMERGENCY)
Dept: RADIOLOGY | Facility: HOSPITAL | Age: 34
End: 2024-02-02
Payer: MEDICARE

## 2024-02-02 ENCOUNTER — TELEPHONE (OUTPATIENT)
Age: 34
End: 2024-02-02

## 2024-02-02 VITALS
SYSTOLIC BLOOD PRESSURE: 136 MMHG | TEMPERATURE: 98.6 F | HEART RATE: 94 BPM | OXYGEN SATURATION: 99 % | RESPIRATION RATE: 20 BRPM | DIASTOLIC BLOOD PRESSURE: 87 MMHG

## 2024-02-02 DIAGNOSIS — M54.2 NECK PAIN: Primary | ICD-10-CM

## 2024-02-02 DIAGNOSIS — M62.838 MUSCLE SPASM: ICD-10-CM

## 2024-02-02 DIAGNOSIS — H93.19 TINNITUS: ICD-10-CM

## 2024-02-02 DIAGNOSIS — M54.9 BACK PAIN: ICD-10-CM

## 2024-02-02 LAB
ANION GAP SERPL CALCULATED.3IONS-SCNC: 7 MMOL/L
BASOPHILS # BLD AUTO: 0.04 THOUSANDS/ÂΜL (ref 0–0.1)
BASOPHILS NFR BLD AUTO: 0 % (ref 0–1)
BUN SERPL-MCNC: 12 MG/DL (ref 5–25)
CALCIUM SERPL-MCNC: 9.8 MG/DL (ref 8.4–10.2)
CARDIAC TROPONIN I PNL SERPL HS: 2 NG/L
CHLORIDE SERPL-SCNC: 107 MMOL/L (ref 96–108)
CO2 SERPL-SCNC: 24 MMOL/L (ref 21–32)
CREAT SERPL-MCNC: 0.89 MG/DL (ref 0.6–1.3)
EOSINOPHIL # BLD AUTO: 0.05 THOUSAND/ÂΜL (ref 0–0.61)
EOSINOPHIL NFR BLD AUTO: 1 % (ref 0–6)
ERYTHROCYTE [DISTWIDTH] IN BLOOD BY AUTOMATED COUNT: 12.2 % (ref 11.6–15.1)
GFR SERPL CREATININE-BSD FRML MDRD: 113 ML/MIN/1.73SQ M
GLUCOSE SERPL-MCNC: 99 MG/DL (ref 65–140)
HCT VFR BLD AUTO: 42.2 % (ref 36.5–49.3)
HGB BLD-MCNC: 14.5 G/DL (ref 12–17)
IMM GRANULOCYTES # BLD AUTO: 0.02 THOUSAND/UL (ref 0–0.2)
IMM GRANULOCYTES NFR BLD AUTO: 0 % (ref 0–2)
LYMPHOCYTES # BLD AUTO: 1.75 THOUSANDS/ÂΜL (ref 0.6–4.47)
LYMPHOCYTES NFR BLD AUTO: 16 % (ref 14–44)
MCH RBC QN AUTO: 29.8 PG (ref 26.8–34.3)
MCHC RBC AUTO-ENTMCNC: 34.4 G/DL (ref 31.4–37.4)
MCV RBC AUTO: 87 FL (ref 82–98)
MONOCYTES # BLD AUTO: 0.74 THOUSAND/ÂΜL (ref 0.17–1.22)
MONOCYTES NFR BLD AUTO: 7 % (ref 4–12)
NEUTROPHILS # BLD AUTO: 8.11 THOUSANDS/ÂΜL (ref 1.85–7.62)
NEUTS SEG NFR BLD AUTO: 76 % (ref 43–75)
NRBC BLD AUTO-RTO: 0 /100 WBCS
PLATELET # BLD AUTO: 266 THOUSANDS/UL (ref 149–390)
PMV BLD AUTO: 8.8 FL (ref 8.9–12.7)
POTASSIUM SERPL-SCNC: 3.6 MMOL/L (ref 3.5–5.3)
RBC # BLD AUTO: 4.86 MILLION/UL (ref 3.88–5.62)
SODIUM SERPL-SCNC: 138 MMOL/L (ref 135–147)
WBC # BLD AUTO: 10.71 THOUSAND/UL (ref 4.31–10.16)

## 2024-02-02 PROCEDURE — 96361 HYDRATE IV INFUSION ADD-ON: CPT

## 2024-02-02 PROCEDURE — 36415 COLL VENOUS BLD VENIPUNCTURE: CPT

## 2024-02-02 PROCEDURE — 84484 ASSAY OF TROPONIN QUANT: CPT

## 2024-02-02 PROCEDURE — 96360 HYDRATION IV INFUSION INIT: CPT

## 2024-02-02 PROCEDURE — 93005 ELECTROCARDIOGRAM TRACING: CPT

## 2024-02-02 PROCEDURE — 71046 X-RAY EXAM CHEST 2 VIEWS: CPT

## 2024-02-02 PROCEDURE — 72125 CT NECK SPINE W/O DYE: CPT

## 2024-02-02 PROCEDURE — 80048 BASIC METABOLIC PNL TOTAL CA: CPT

## 2024-02-02 PROCEDURE — 85025 COMPLETE CBC W/AUTO DIFF WBC: CPT

## 2024-02-02 PROCEDURE — 99285 EMERGENCY DEPT VISIT HI MDM: CPT

## 2024-02-02 PROCEDURE — 99284 EMERGENCY DEPT VISIT MOD MDM: CPT

## 2024-02-02 RX ORDER — METHOCARBAMOL 500 MG/1
500 TABLET, FILM COATED ORAL ONCE
Status: COMPLETED | OUTPATIENT
Start: 2024-02-02 | End: 2024-02-02

## 2024-02-02 RX ORDER — LIDOCAINE 50 MG/G
1 PATCH TOPICAL DAILY
Qty: 5 PATCH | Refills: 0 | Status: SHIPPED | OUTPATIENT
Start: 2024-02-02 | End: 2024-02-07

## 2024-02-02 RX ORDER — METHOCARBAMOL 500 MG/1
1500 TABLET, FILM COATED ORAL 3 TIMES DAILY
Qty: 27 TABLET | Refills: 0 | Status: SHIPPED | OUTPATIENT
Start: 2024-02-02 | End: 2024-02-05

## 2024-02-02 RX ORDER — ACETAMINOPHEN 10 MG/ML
1000 INJECTION, SOLUTION INTRAVENOUS ONCE
Status: DISCONTINUED | OUTPATIENT
Start: 2024-02-02 | End: 2024-02-03 | Stop reason: HOSPADM

## 2024-02-02 RX ORDER — LIDOCAINE 50 MG/G
1 PATCH TOPICAL ONCE
Status: DISCONTINUED | OUTPATIENT
Start: 2024-02-02 | End: 2024-02-03 | Stop reason: HOSPADM

## 2024-02-02 RX ADMIN — LIDOCAINE 1 PATCH: 50 PATCH TOPICAL at 20:39

## 2024-02-02 RX ADMIN — SODIUM CHLORIDE 1000 ML: 0.9 INJECTION, SOLUTION INTRAVENOUS at 20:33

## 2024-02-02 RX ADMIN — METHOCARBAMOL TABLETS 500 MG: 500 TABLET, COATED ORAL at 20:38

## 2024-02-02 NOTE — TELEPHONE ENCOUNTER
Hi this is Jose Hayloydashiloh April 5th 1990 [phone]. I need to schedule an appointment with either Doctor Palomares or another doctor as soon as possible. I am just having symptoms of meningitis, stiff neck, back pain, tingling in fingers in the right arm, and other symptoms. Also, I've been having a that are erratic the blood flow to the neck, especially during the night. That's been causing me pulsatile tinnitus. Please give me a call back as soon as possible. Thank you.

## 2024-02-03 LAB
ATRIAL RATE: 114 BPM
P AXIS: 42 DEGREES
PR INTERVAL: 162 MS
QRS AXIS: 39 DEGREES
QRSD INTERVAL: 94 MS
QT INTERVAL: 326 MS
QTC INTERVAL: 449 MS
T WAVE AXIS: 49 DEGREES
VENTRICULAR RATE: 114 BPM

## 2024-02-03 NOTE — ED TRIAGE NOTES
Patient very anxious during triage.  Patient reports he is currently on no medications despite having schizophrenia and high blood pressure- reports his doctor took him off his meds.

## 2024-02-03 NOTE — DISCHARGE INSTRUCTIONS
"Your CT scan shows, \"IMPRESSION:     C6-7 posterior disc osteophyte complex causes moderate central canal stenosis, and possibly severe right neural foraminal stenosis\".     Medications as prescribed.  Follow-up with your primary care provider and return to the emergency room for any worsening symptoms.  "

## 2024-02-03 NOTE — ED PROVIDER NOTES
"History  Chief Complaint   Patient presents with    Neck Pain     Patient reports neck pain, right hand tingling, and \"tinnitus\" for the past month.       Patient is a 33-year-old male who presents to the emergency room with his mother at bedside.  Reports symptoms have been ongoing for the past month.  Patient reports neck pain and upper back pain.  Associated tingling down his right arm that waxes and wanes.  Reports bilateral tinnitus that wax and wanes.  Denies sensation loss, decreased strength, weakness.  Pulses intact.  No trauma, injury.  Denies any other symptoms at this time.  Patient has not tried any medication for symptom relief.        Prior to Admission Medications   Prescriptions Last Dose Informant Patient Reported? Taking?   OLANZapine (ZyPREXA) 15 mg tablet   Yes No   Sig: Take 15 mg by mouth daily   hydrochlorothiazide (HYDRODIURIL) 25 mg tablet   No No   Sig: Take 0.5 tablets (12.5 mg total) by mouth daily   multivitamin (THERAGRAN) TABS  Self Yes No   Sig: Take 1 tablet by mouth daily      Facility-Administered Medications: None       Past Medical History:   Diagnosis Date    Anxiety     GERD (gastroesophageal reflux disease)     H. pylori infection     Hematochezia     Hypercholesteremia     Hyperlipidemia     Psychosis (HCC)     Last Assessed:  11/19/15    Schizophrenia (HCC)        Past Surgical History:   Procedure Laterality Date    COLONOSCOPY      UT COLONOSCOPY FLX DX W/COLLJ SPEC WHEN PFRMD N/A 9/27/2017    Procedure: COLONOSCOPY;  Surgeon: Filippo Rodgers MD;  Location: [facility];  Service: Gastroenterology       Family History   Problem Relation Age of Onset    Hypertension Family     Cancer Family      I have reviewed and agree with the history as documented.    E-Cigarette/Vaping    E-Cigarette Use Never User      E-Cigarette/Vaping Substances    Nicotine No     THC No     CBD No     Flavoring No     Other No     Unknown No      Social History     Tobacco Use    Smoking status: " Former     Current packs/day: 0.00     Average packs/day: 0.3 packs/day for 13.2 years (3.3 ttl pk-yrs)     Types: Cigarettes     Start date:      Quit date: 2023     Years since quittin.8    Smokeless tobacco: Never    Tobacco comments:     2-3 cigarettes a day   Vaping Use    Vaping status: Never Used   Substance Use Topics    Alcohol use: Yes     Comment: occ    Drug use: No       Review of Systems   Constitutional:  Negative for chills and fever.   HENT:  Positive for tinnitus (Bilateral). Negative for ear pain, sore throat, trouble swallowing and voice change.    Eyes:  Negative for pain and visual disturbance.   Respiratory:  Negative for cough and shortness of breath.    Cardiovascular:  Negative for chest pain and palpitations.   Gastrointestinal:  Negative for abdominal pain, nausea and vomiting.   Genitourinary:  Negative for dysuria, flank pain and hematuria.   Musculoskeletal:  Positive for back pain and neck pain. Negative for arthralgias.   Skin:  Negative for color change and rash.   Neurological:  Negative for dizziness, seizures, syncope and headaches.        Tingling down R arm   Psychiatric/Behavioral:  Negative for confusion. The patient is nervous/anxious.    All other systems reviewed and are negative.      Physical Exam  Physical Exam  Vitals and nursing note reviewed.   Constitutional:       General: He is not in acute distress.     Appearance: He is well-developed.   HENT:      Head: Normocephalic and atraumatic.      Mouth/Throat:      Mouth: Mucous membranes are moist.   Eyes:      Conjunctiva/sclera: Conjunctivae normal.   Cardiovascular:      Rate and Rhythm: Normal rate and regular rhythm.      Heart sounds: No murmur heard.  Pulmonary:      Effort: Pulmonary effort is normal. No respiratory distress.      Breath sounds: Normal breath sounds.   Abdominal:      Palpations: Abdomen is soft.      Tenderness: There is no abdominal tenderness. There is no right CVA tenderness or  left CVA tenderness.   Musculoskeletal:         General: No swelling or tenderness.      Cervical back: Neck supple.   Skin:     General: Skin is warm and dry.      Capillary Refill: Capillary refill takes less than 2 seconds.   Neurological:      Mental Status: He is alert and oriented to person, place, and time.   Psychiatric:         Mood and Affect: Mood normal.         Vital Signs  ED Triage Vitals [02/02/24 1945]   Temperature Pulse Respirations Blood Pressure SpO2   98.6 °F (37 °C) (!) 145 20 165/77 100 %      Temp Source Heart Rate Source Patient Position - Orthostatic VS BP Location FiO2 (%)   Temporal Monitor Sitting Left arm --      Pain Score       --           Vitals:    02/02/24 1945 02/02/24 2032 02/02/24 2230   BP: 165/77  136/87   Pulse: (!) 145 (!) 109 94   Patient Position - Orthostatic VS: Sitting           Visual Acuity      ED Medications  Medications   sodium chloride 0.9 % bolus 1,000 mL (0 mL Intravenous Stopped 2/2/24 2242)   methocarbamol (ROBAXIN) tablet 500 mg (500 mg Oral Given 2/2/24 2038)       Diagnostic Studies  Results Reviewed       Procedure Component Value Units Date/Time    HS Troponin 0hr (reflex protocol) [[id number]]  (Normal) Collected: 02/02/24 2025    Lab Status: Final result Specimen: Blood from Arm, Left Updated: 02/02/24 2121     hs TnI 0hr 2 ng/L     Basic metabolic panel [[id number]] Collected: 02/02/24 2025    Lab Status: Final result Specimen: Blood from Arm, Left Updated: 02/02/24 2107     Sodium 138 mmol/L      Potassium 3.6 mmol/L      Chloride 107 mmol/L      CO2 24 mmol/L      ANION GAP 7 mmol/L      BUN 12 mg/dL      Creatinine 0.89 mg/dL      Glucose 99 mg/dL      Calcium 9.8 mg/dL      eGFR 113 ml/min/1.73sq m     Narrative:      National Kidney Disease Foundation guidelines for Chronic Kidney Disease (CKD):     Stage 1 with normal or high GFR (GFR > 90 mL/min/1.73 square meters)    Stage 2 Mild CKD (GFR = 60-89 mL/min/1.73 square meters)    Stage 3A Moderate  CKD (GFR = 45-59 mL/min/1.73 square meters)    Stage 3B Moderate CKD (GFR = 30-44 mL/min/1.73 square meters)    Stage 4 Severe CKD (GFR = 15-29 mL/min/1.73 square meters)    Stage 5 End Stage CKD (GFR <15 mL/min/1.73 square meters)  Note: GFR calculation is accurate only with a steady state creatinine    CBC and differential [[id number]]  (Abnormal) Collected: 02/02/24 2025    Lab Status: Final result Specimen: Blood from Arm, Left Updated: 02/02/24 2051     WBC 10.71 Thousand/uL      RBC 4.86 Million/uL      Hemoglobin 14.5 g/dL      Hematocrit 42.2 %      MCV 87 fL      MCH 29.8 pg      MCHC 34.4 g/dL      RDW 12.2 %      MPV 8.8 fL      Platelets 266 Thousands/uL      nRBC 0 /100 WBCs      Neutrophils Relative 76 %      Immat GRANS % 0 %      Lymphocytes Relative 16 %      Monocytes Relative 7 %      Eosinophils Relative 1 %      Basophils Relative 0 %      Neutrophils Absolute 8.11 Thousands/µL      Immature Grans Absolute 0.02 Thousand/uL      Lymphocytes Absolute 1.75 Thousands/µL      Monocytes Absolute 0.74 Thousand/µL      Eosinophils Absolute 0.05 Thousand/µL      Basophils Absolute 0.04 Thousands/µL                    CT cervical spine without contrast   Final Result by Isaac De La Fuente MD (02/02 2228)      C6-7 posterior disc osteophyte complex causes moderate central canal stenosis, and possibly severe right neural foraminal stenosis               Workstation performed: [id number]         XR chest 2 views   ED Interpretation by Deja Chu PA-C (02/02 2058)   No acute cardiopulmonary abnormality.  Compared to chest x-ray 12/27/23.                  Procedures  ECG 12 Lead Documentation Only    Date/Time: 2/2/2024 8:37 PM    Performed by: Deja Chu PA-C  Authorized by: Deja Chu PA-C    Indications / Diagnosis:  Cardiac workup  ECG reviewed by me, the ED Provider: yes    Patient location:  ED  Interpretation:     Interpretation: non-specific    Rate:     ECG rate:  114    ECG rate  "assessment: tachycardic    ST segments:     ST segments:  Normal  T waves:     T waves: normal             ED Course  ED Course as of 02/03/24 0547   Fri Feb 02, 2024 2040 Pulse(!): 109   2057 WBC(!): 10.71   2233 CT cervical spine without contrast  IMPRESSION:     C6-7 posterior disc osteophyte complex causes moderate central canal stenosis, and possibly severe right neural foraminal stenosis        2256 Pulse: 94             HEART Risk Score      Flowsheet Row Most Recent Value   Heart Score Risk Calculator    History 0 Filed at: 02/02/2024 2132   ECG 1 Filed at: 02/02/2024 2132   Age 0 Filed at: 02/02/2024 2132   Risk Factors 1 Filed at: 02/02/2024 2132   Troponin 0 Filed at: 02/02/2024 2132   HEART Score 2 Filed at: 02/02/2024 2132                          SBIRT 22yo+      Flowsheet Row Most Recent Value   Initial Alcohol Screen: US AUDIT-C     1. How often do you have a drink containing alcohol? 0 Filed at: 02/02/2024 2001   2. How many drinks containing alcohol do you have on a typical day you are drinking?  0 Filed at: 02/02/2024 2001   3a. Male UNDER 65: How often do you have five or more drinks on one occasion? 0 Filed at: 02/02/2024 2001   Audit-C Score 0 Filed at: 02/02/2024 2001   GUMARO: How many times in the past year have you...    Used an illegal drug or used a prescription medication for non-medical reasons? Never Filed at: 02/02/2024 2001                      Medical Decision Making  33-year-old male presenting for neck pain, upper back pain, radiation of tingling down his right arm, tinnitus.  On arrival, heart rate 145 bpm but improved significantly during ED course to 94 bpm prior to discharge.  Vital signs stable throughout ED course.  Unremarkable physical exam.  WBC 10.71, other lab work unremarkable.  Chest x-ray unremarkable..  CT cervical spine showed, \"IMPRESSION:     C6-7 posterior disc osteophyte complex causes moderate central canal stenosis, and possibly severe right neural foraminal " "stenosis\".  Patient given fluids, Robaxin and lidocaine patches in the ED, and patient to follow-up with his primary care provider.  Patient to return to the emergency room for any worsening symptoms.  Patient understands and agrees to discharge at this time.  All questions answered appropriately.       Problems Addressed:  Back pain: acute illness or injury  Muscle spasm: acute illness or injury  Neck pain: acute illness or injury  Tinnitus: acute illness or injury    Amount and/or Complexity of Data Reviewed  Labs: ordered. Decision-making details documented in ED Course.  Radiology: ordered and independent interpretation performed. Decision-making details documented in ED Course.    Risk  Prescription drug management.             Disposition  Final diagnoses:   Neck pain   Muscle spasm   Tinnitus   Back pain     Time reflects when diagnosis was documented in both MDM as applicable and the Disposition within this note       Time User Action Codes Description Comment    2/2/2024 10:44 PM Deja Chu [M54.2] Neck pain     2/2/2024 10:44 PM Deja Chu [M62.838] Muscle spasm     2/2/2024 10:44 PM Deja Chu Add [H93.19] Tinnitus     2/2/2024 10:55 PM Deja Chu Add [M54.9] Back pain           ED Disposition       ED Disposition   Discharge    Condition   Stable    Date/Time   Fri Feb 2, 2024 2244    Comment   Jose Herring discharge to home/self care.                   Follow-up Information       Follow up With Specialties Details Why Contact Info Additional Information    Ulisses Palomares MD Internal Medicine, Hospice Services, Palliative Care   [address]  [address]  [facility] [address]  [phone]       [facility] Emergency Medicine Go to  If symptoms worsen [address] [facility] [address]  [phone] [facility], [address], [place], [address]            Discharge " Medication List as of 2/2/2024 10:57 PM        START taking these medications    Details   lidocaine (Lidoderm) 5 % Apply 1 patch topically over 12 hours daily for 5 days Remove & Discard patch within 12 hours or as directed by MD, Starting Fri 2/2/2024, Until Wed 2/7/2024, Normal      methocarbamol (ROBAXIN) 500 mg tablet Take 3 tablets (1,500 mg total) by mouth 3 (three) times a day for 3 days, Starting Fri 2/2/2024, Until Mon 2/5/2024, Normal           CONTINUE these medications which have NOT CHANGED    Details   hydrochlorothiazide (HYDRODIURIL) 25 mg tablet Take 0.5 tablets (12.5 mg total) by mouth daily, Starting Wed 12/27/2023, Print      multivitamin (THERAGRAN) TABS Take 1 tablet by mouth daily, Historical Med      OLANZapine (ZyPREXA) 15 mg tablet Take 15 mg by mouth daily, Starting Thu 11/30/2023, Historical Med             No discharge procedures on file.    PDMP Review       None            ED Provider  Electronically Signed by             Deja Chu PA-C  02/03/24 0572

## 2024-02-05 ENCOUNTER — TELEPHONE (OUTPATIENT)
Age: 34
End: 2024-02-05

## 2024-02-05 ENCOUNTER — OFFICE VISIT (OUTPATIENT)
Age: 34
End: 2024-02-05
Payer: MEDICARE

## 2024-02-05 VITALS
HEIGHT: 68 IN | DIASTOLIC BLOOD PRESSURE: 80 MMHG | HEART RATE: 119 BPM | TEMPERATURE: 98 F | OXYGEN SATURATION: 99 % | BODY MASS INDEX: 30.77 KG/M2 | SYSTOLIC BLOOD PRESSURE: 128 MMHG | WEIGHT: 203 LBS

## 2024-02-05 DIAGNOSIS — M48.02 CERVICAL STENOSIS OF SPINAL CANAL: Primary | ICD-10-CM

## 2024-02-05 DIAGNOSIS — F20.9 SCHIZOPHRENIA, UNSPECIFIED TYPE (HCC): ICD-10-CM

## 2024-02-05 DIAGNOSIS — H93.13 TINNITUS OF BOTH EARS: ICD-10-CM

## 2024-02-05 DIAGNOSIS — E66.9 OBESITY (BMI 30-39.9): ICD-10-CM

## 2024-02-05 PROCEDURE — 99214 OFFICE O/P EST MOD 30 MIN: CPT

## 2024-02-05 NOTE — PROGRESS NOTES
"INTERNAL MEDICINE FOLLOW-UP VISIT  [facility] - [facility] INTERNAL MEDICINE [address]    NAME: Jose Herring  AGE: 33 y.o. SEX: male  : 1990     DATE: 2024     Assessment and Plan:   1. Cervical stenosis of spinal canal  CT cervical spine showing C6-C7 posterior disc osteophyte with moderate central canal stenosis as well as a possible severe right neural foraminal stenosis.  He does complain of right arm \"tingling\" down to his fingertips.  Will refer to comprehensive spine.    2. Obesity 30.87   Limit carbohydrates such as bread, rice, pasta and white potato limit sweets    3. Schizophrenia, unspecified type (HCC)  Follows with psychiatry    4.  Hypertension  Continue on 12.5 mg HCTZ daily.  If able to check your blood pressure check this twice a week and record      No follow-ups on file.       Chief Complaint:     Chief Complaint   Patient presents with   • Follow-up      History of Present Illness:     Jose is here today to discuss neck pain as well as to review his CAT scan.  He went to the emergency room on  due to an increase of neck pain as well as radiculopathy down his right arm.  He also complained of tinnitus in his ears.  He has a history of anxiety as well as schizophrenia therefore the symptoms caused him to have an increase in his symptoms of anxiety.  He also had elevated blood pressure upon arrival to the ER at 165/77.  He was treated with hydrochlorothiazide 12.5 mg daily as well as a CAT scan to his neck.    The following portions of the patient's history were reviewed and updated as appropriate: allergies, current medications, past family history, past medical history, past social history, past surgical history and problem list.     Review of Systems:     Review of Systems   Constitutional:  Negative for appetite change, chills, diaphoresis, fatigue, fever and unexpected weight change.   HENT:  Negative for postnasal drip and sneezing.    Eyes:  " "Negative for visual disturbance.   Respiratory:  Negative for chest tightness and shortness of breath.    Cardiovascular:  Negative for chest pain, palpitations and leg swelling.   Gastrointestinal:  Negative for abdominal pain and blood in stool.   Endocrine: Negative for cold intolerance, heat intolerance, polydipsia, polyphagia and polyuria.   Genitourinary:  Negative for difficulty urinating, dysuria, frequency and urgency.   Musculoskeletal:  Positive for neck pain. Negative for arthralgias and myalgias.   Skin:  Negative for rash and wound.   Neurological:  Positive for numbness. Negative for dizziness, weakness, light-headedness and headaches.   Hematological:  Negative for adenopathy.   Psychiatric/Behavioral:  Negative for confusion, dysphoric mood and sleep disturbance. The patient is not nervous/anxious.         Past Medical History:     Past Medical History:   Diagnosis Date   • Anxiety    • GERD (gastroesophageal reflux disease)    • H. pylori infection    • Hematochezia    • Hypercholesteremia    • Hyperlipidemia    • Psychosis (HCC)     Last Assessed:  11/19/15   • Schizophrenia (HCC)         Current Medications:     Current Outpatient Medications:   •  lidocaine (Lidoderm) 5 %, Apply 1 patch topically over 12 hours daily for 5 days Remove & Discard patch within 12 hours or as directed by MD, Disp: 5 patch, Rfl: 0  •  methocarbamol (ROBAXIN) 500 mg tablet, Take 3 tablets (1,500 mg total) by mouth 3 (three) times a day for 3 days, Disp: 27 tablet, Rfl: 0  •  multivitamin (THERAGRAN) TABS, Take 1 tablet by mouth daily, Disp: , Rfl:   •  OLANZapine (ZyPREXA) 15 mg tablet, Take 15 mg by mouth daily (Patient not taking: Reported on 2/5/2024), Disp: , Rfl:      Allergies:   No Known Allergies     Physical Exam:     /80 (BP Location: Left arm, Patient Position: Sitting, Cuff Size: Large)   Pulse (!) 119   Temp 98 °F (36.7 °C) (Tympanic)   Ht 5' 8\" (1.727 m)   Wt 92.1 kg (203 lb)   SpO2 99%   BMI " 30.87 kg/m²     Physical Exam  Constitutional:       Appearance: He is well-developed.   HENT:      Head: Normocephalic and atraumatic.   Eyes:      Conjunctiva/sclera: Conjunctivae normal.      Pupils: Pupils are equal, round, and reactive to light.   Cardiovascular:      Rate and Rhythm: Normal rate and regular rhythm.      Heart sounds: Normal heart sounds.   Pulmonary:      Effort: Pulmonary effort is normal.      Breath sounds: Normal breath sounds.   Abdominal:      General: Bowel sounds are normal.      Palpations: Abdomen is soft.   Musculoskeletal:         General: Normal range of motion.      Cervical back: Normal range of motion.   Skin:     General: Skin is warm and dry.   Neurological:      Mental Status: He is alert and oriented to person, place, and time.           Data:     Laboratory Results: I have personally reviewed the pertinent laboratory results/reports   Radiology/Other Diagnostic Testing Results: I have personally reviewed pertinent reports.      KANNAN Lake  [facility] INTERNAL MEDICINE [address]

## 2024-02-05 NOTE — TELEPHONE ENCOUNTER
Hi my name is Jose Herring or [phone]. I need to schedule a follow up appointment with either doctor Palomares, another doctor to review the results of my tax scan. Please give me a call back as soon as possible. Thank you.

## 2024-02-06 ENCOUNTER — NURSE TRIAGE (OUTPATIENT)
Dept: PHYSICAL THERAPY | Facility: OTHER | Age: 34
End: 2024-02-06

## 2024-02-06 DIAGNOSIS — M54.2 ACUTE NECK PAIN: Primary | ICD-10-CM

## 2024-02-06 NOTE — TELEPHONE ENCOUNTER
"Patient called into [facility] today and left v/m @1:45pm.    Returned patient's call @2:15pm.    Additional Information   Negative: Is this related to a work injury?   Negative: Is this related to an MVA?   Negative: Are you currently recieving homecare services?    Background - Initial Assessment  Clinical complaint: Neck Pain for over a month. Patient went to the ED on 02/02 and saw his PCP yesterday 02/05 who referred patient to [facility]. Patient states pain radiates to right arm, both shoulders, upper back. Tingling sensation in hands and fingers, numbness in index finger. Tingling sensation is wore when he is sitting down and keeps his back straight. NKI. Pain is intermittent \"on and off\". Patient described pain as dull and ache. CT shows: Cervical stenosis C6-7.  Date of onset:  over a month ago  Frequency of pain: intermittent  Quality of pain: aching, dull, numb, and tingling.    Protocols used: [facility] Protocol    "

## 2024-02-06 NOTE — TELEPHONE ENCOUNTER
Additional Information   Negative: Has the patient had unexplained weight loss?   Negative: Does the patient have a fever?   Negative: Is the patient experiencing urine retention?   Negative: Is the patient experiencing blood in sputum?   Negative: Is the patient experiencing acute drop foot or paralysis?   Negative: Has the patient experienced major trauma? (fall from height, high speed collision, direct blow to spine) and is also experiencing nausea, light-headedness, or loss of consciousness?   Negative: Is this a chronic condition?    Protocols used: [facility] Protocol    This RN did review in detail the [facility] and what we can provide for their neck pain.  Patient is agreeable to being triaged by this RN and would like to proceed with Physical Therapy.    Referral was placed for Physical Therapy at the [place] site. Patients information was sent to the  to make evaluation appointment. Patient made aware that the PT office  will be calling to schedule the appointment.  Patient was provided with the phone number to the PT office.    No further questions and/or concerns were voiced by the patient at this time. Patient states understanding of the referral that was placed.    Referral Closed.

## 2024-02-12 ENCOUNTER — OFFICE VISIT (OUTPATIENT)
Age: 34
End: 2024-02-12
Payer: MEDICARE

## 2024-02-12 VITALS
HEART RATE: 115 BPM | BODY MASS INDEX: 29.55 KG/M2 | SYSTOLIC BLOOD PRESSURE: 118 MMHG | OXYGEN SATURATION: 98 % | WEIGHT: 195 LBS | RESPIRATION RATE: 16 BRPM | DIASTOLIC BLOOD PRESSURE: 84 MMHG | HEIGHT: 68 IN

## 2024-02-12 DIAGNOSIS — M48.02 CERVICAL STENOSIS OF SPINAL CANAL: ICD-10-CM

## 2024-02-12 DIAGNOSIS — K21.9 GASTROESOPHAGEAL REFLUX DISEASE WITHOUT ESOPHAGITIS: Primary | ICD-10-CM

## 2024-02-12 PROCEDURE — 99213 OFFICE O/P EST LOW 20 MIN: CPT

## 2024-02-12 RX ORDER — PANTOPRAZOLE SODIUM 20 MG/1
20 TABLET, DELAYED RELEASE ORAL
Qty: 60 TABLET | Refills: 0 | Status: SHIPPED | OUTPATIENT
Start: 2024-02-12 | End: 2024-08-10

## 2024-02-12 NOTE — PROGRESS NOTES
INTERNAL MEDICINE FOLLOW-UP VISIT  [facility] - [facility] INTERNAL MEDICINE [address]    NAME: Jose Herring  AGE: 33 y.o. SEX: male  : 1990     DATE: 2024     Assessment and Plan:   1. Gastroesophageal reflux disease without esophagitis  Most likely GERD with potential underlying URI. Discussed starting pantoprazole daily to help with reflux symptoms x4 weeks.  Discussed remaining upright 30 minutes after eating, elevating the head of his bed, and avoiding foods that trigger it like red sauces, spicy foods, or coffee.  Can use Tums or Pepto-Bismol as needed.  If you begin experience any worsening trouble swallowing over the next few days, notify the office.    2. Cervical stenosis of spinal canal  Scheduled with PT on Thursday.         No follow-ups on file.       Chief Complaint:     Chief Complaint   Patient presents with    GERD     Started to worsen 5 days ago-food gets stuck. Also having issues with stenosis.      History of Present Illness:   Patient is a 33-year-old male that presents today for acid reflux.  He notes for the past 5 days he has been having food stuck in his throat.  He feels like he has something stuck in the back of his throat.  He denies a history of alcohol he denies any problems with liquids.  Prior to the past 5 days he admits to a long history of belching.  He denies any abdominal pain, nausea, vomiting, constipation, or diarrhea.  He denies a history alcohol or tobacco use.  He has not tried anything like Tums or Pepto-Bismol at home.    The following portions of the patient's history were reviewed and updated as appropriate: allergies, current medications, past family history, past medical history, past social history, past surgical history and problem list.     Review of Systems:     Review of Systems   Constitutional:  Negative for chills and fever.   HENT:  Positive for postnasal drip, rhinorrhea, sore throat and trouble swallowing. Negative for ear  "discharge, ear pain and voice change.    Eyes:  Negative for pain and itching.   Respiratory:  Negative for cough, shortness of breath and wheezing.    Cardiovascular:  Negative for chest pain and leg swelling.   Gastrointestinal:  Negative for abdominal pain, constipation, diarrhea, nausea and vomiting.   Genitourinary:  Negative for difficulty urinating and enuresis.   Neurological:  Positive for light-headedness. Negative for dizziness and headaches.        Past Medical History:     Past Medical History:   Diagnosis Date    Anxiety     GERD (gastroesophageal reflux disease)     H. pylori infection     Hematochezia     Hypercholesteremia     Hyperlipidemia     Psychosis (HCC)     Last Assessed:  11/19/15    Schizophrenia ([facility])         Current Medications:     Current Outpatient Medications:     multivitamin (THERAGRAN) TABS, Take 1 tablet by mouth daily, Disp: , Rfl:     lidocaine (Lidoderm) 5 %, Apply 1 patch topically over 12 hours daily for 5 days Remove & Discard patch within 12 hours or as directed by MD (Patient not taking: Reported on 2/12/2024), Disp: 5 patch, Rfl: 0    methocarbamol (ROBAXIN) 500 mg tablet, Take 3 tablets (1,500 mg total) by mouth 3 (three) times a day for 3 days (Patient not taking: Reported on 2/12/2024), Disp: 27 tablet, Rfl: 0    OLANZapine (ZyPREXA) 15 mg tablet, Take 15 mg by mouth daily (Patient not taking: Reported on 2/5/2024), Disp: , Rfl:      Allergies:   No Known Allergies     Physical Exam:     /84 (BP Location: Left arm, Patient Position: Sitting, Cuff Size: Standard)   Pulse (!) 115   Resp 16   Ht 5' 8\" (1.727 m)   Wt 88.5 kg (195 lb)   SpO2 98%   BMI 29.65 kg/m²     Physical Exam  Vitals and nursing note reviewed.   Constitutional:       General: He is awake.      Appearance: Normal appearance. He is well-developed, well-groomed and overweight.   HENT:      Head: Normocephalic and atraumatic.      Right Ear: Hearing, tympanic membrane, ear canal and external " ear normal.      Left Ear: Hearing, tympanic membrane, ear canal and external ear normal.      Nose: Nose normal.      Mouth/Throat:      Lips: Pink.      Mouth: Mucous membranes are moist.      Pharynx: Oropharynx is clear. Uvula midline.   Eyes:      General: Lids are normal. Vision grossly intact. Gaze aligned appropriately.      Conjunctiva/sclera: Conjunctivae normal.   Neck:      Vascular: No carotid bruit.      Trachea: Trachea and phonation normal.   Cardiovascular:      Rate and Rhythm: Regular rhythm. Tachycardia present.      Heart sounds: Normal heart sounds, S1 normal and S2 normal. No murmur heard.     No friction rub. No gallop.   Pulmonary:      Effort: Pulmonary effort is normal.      Breath sounds: Normal breath sounds and air entry. No decreased breath sounds, wheezing, rhonchi or rales.   Abdominal:      General: Abdomen is protuberant.   Musculoskeletal:      Cervical back: Neck supple.      Right lower leg: No edema.      Left lower leg: No edema.   Skin:     General: Skin is warm.      Capillary Refill: Capillary refill takes less than 2 seconds.   Neurological:      Mental Status: He is alert.   Psychiatric:         Attention and Perception: Attention and perception normal.         Mood and Affect: Mood and affect normal.         Speech: Speech normal.         Behavior: Behavior normal. Behavior is cooperative.         Thought Content: Thought content normal.         Cognition and Memory: Cognition and memory normal.         Judgment: Judgment normal.           Data:     Laboratory Results: I have personally reviewed the pertinent laboratory results/reports   Radiology/Other Diagnostic Testing Results: I have personally reviewed pertinent reports.      Brittny Croft PA-C  [facility] INTERNAL MEDICINE [facility]

## 2024-02-15 ENCOUNTER — EVALUATION (OUTPATIENT)
Dept: PHYSICAL THERAPY | Facility: CLINIC | Age: 34
End: 2024-02-15
Payer: MEDICARE

## 2024-02-15 VITALS — DIASTOLIC BLOOD PRESSURE: 84 MMHG | SYSTOLIC BLOOD PRESSURE: 130 MMHG | HEART RATE: 85 BPM

## 2024-02-15 DIAGNOSIS — M54.2 ACUTE NECK PAIN: Primary | ICD-10-CM

## 2024-02-15 PROCEDURE — 97140 MANUAL THERAPY 1/> REGIONS: CPT | Performed by: PHYSICAL THERAPIST

## 2024-02-15 PROCEDURE — 97161 PT EVAL LOW COMPLEX 20 MIN: CPT | Performed by: PHYSICAL THERAPIST

## 2024-02-15 NOTE — PROGRESS NOTES
PT Evaluation     Today's date: 2/15/2024  Patient name: Jose Herring  : 1990  MRN: [id number]  Referring provider: Ulisses Palomares MD  Dx:   Encounter Diagnosis     ICD-10-CM    1. Acute neck pain  M54.2           Start Time: 1230  Stop Time: 1310  Total time in clinic (min): 40 minutes    Assessment  Assessment details: Patient is a 33 y.o. male who presents to physical therapy via Comp [facility] program with c/o neck pain with right sided radiculopathy. Patient presents to evaluation with pain, decreased range of motion, decreased strength, and decreased tolerance to activity. Patient demonstrates good tolerance to treatment focusing on manual interventions with abolished radicular sx with cervical traction, will introduce formal exercises next visit as symptoms allow. I discussed risks, benefits, and alternatives to treatment, and answered all patient questions to patient satisfaction. Patient presents with baseline FOTO score of 63 indicating limited tolerance/ability to complete ADLs. Patient is an appropriate candidate for skilled PT and would benefit from skilled PT services to address the aforementioned impairments, achieve goals, maximize function, and improve quality of life. Pt is in agreement with this plan.    Patient Education: activity modifications as needed, pacing of activities, importance of HEP compliance, PT prognosis/POC    Impairments: abnormal muscle tone, abnormal or restricted ROM, activity intolerance, impaired physical strength, lacks appropriate home exercise program, pain with function, poor posture  and poor body mechanics    Goals  ST weeks  Pt will restore full and pain-free cervical spine AROM to allow return to normal ADLs  Pt will demonstrate centralization of symptoms with repeated movements and/or traction of cervical spine  Pt will decrease neck pain to 3-4/10 with activity   Pt will demonstrate good understanding and compliance with HEP   Pt will demonstrate improved  "postural awareness and ability to self-correct without reliance on external cues    LT weeks  Pt will demonstrate abolished radicular symptoms for 2 weeks  Pt will decrease neck pain to 0-1/10  Pt will exhibit proper lifting mechanics without reliance on external cues for correction of form    Pt will be able to tolerate prolonged standing/sitting activities > 30 minutes without provocation of neck pain   Pt will improve FOTO score to > or = to 74 to indicate improved functional abilities       Plan  Patient would benefit from: PT eval and skilled physical therapy  Planned modality interventions: cryotherapy and thermotherapy: hydrocollator packs  Planned therapy interventions: ADL training, activity modification, joint mobilization, manual therapy, neuromuscular re-education, patient education, postural training, self care, strengthening, stretching, therapeutic activities, therapeutic exercise, home exercise program, graded activity, graded exercise, body mechanics training, functional ROM exercises and flexibility  Frequency: 2x week  Duration in weeks: 8  Plan of Care beginning date: 2/15/2024  Plan of Care expiration date: 2024  Treatment plan discussed with: patient        Subjective Evaluation    History of Present Illness  Mechanism of injury: Pt reports to PT with c/o neck pain that started about 2-3 months ago without antecedent trauma. Pt reports his neck pain started and eventually progressed to intermittent right arm N/T that radiates to his fingers. Pt denies hx of similar sx. Pt was seen in the ED on  and then PCP on  where he was given muscle relaxer and lidocaine patches. Pt denies using muscle relaxer but does use lidocaine patches with transient relief. Pt admits to having tinnitus in his ears and feelings of \"tremors\" in his chest. Pt reports seeing ENT earlier today and told his exam was normal but going for a hearing test in upcoming weeks. Pt reports increased neck pain with all " movements, however states decreased pain with sitting still and sleeping at night. Pt had CT at that revealed C6-C7 central canal stenosis, right foraminal stenosis.     Pt denies hx of cancer, unrelenting night pain, saddle anesthesia, unexplained weight loss, changes in B&B function, changes in balance/gait, recent fever, hx of IV drug use, prolonged corticosteroid use, hx of osteoporosis, recent trauma.    Pt denies nausea, dizziness, diplopia, drop attacks. Pt denies changes in speech, does report to some changes with his ability to swallow certain foods but saw PCP and diagnosed with GERD, instructed to use TUMS and finds this is helping so far.     Imaging: CT scan (24)  C6-7 posterior disc osteophyte complex causes moderate central canal stenosis, and possibly severe right neural foraminal stenosis    Aggravating factors: neck movements    Easing Factors:  Rest, lidocaine patches,     PLOF:  No hx of similar sx, previously fully functional and pain-free    PMH: Anxiety, schizophrenia   Patient Goals  Patient goals for therapy: decreased pain, increased motion, increased strength, independence with ADLs/IADLs and return to sport/leisure activities    Pain  Current pain ratin  At best pain ratin  At worst pain rating: 3  Quality: radiating and dull ache    Hand dominance: right      Diagnostic Tests  CT scan: abnormal        Objective     General Comments:      Cervical/Thoracic Comments  BP: 130/84 mmHg  Pulse: 85    Pt ambulates unassisted with normal gait mechanics     Posture: FHP, RS, increased thoracic kyphosis      Cervical AROM:   Flex: 20*  Ext: 25*  Left ROT: 40* (stiff)  Right ROT: 40* (pain)  Left Side-Bendin* (stiff)  Right Side-Bendin* (stiff)    UE Dermatomes:  C2: Intact/symmetrical  C3: Intact/symmetrical  C4: Intact/symmetrical  C5: Intact/symmetrical  C6: Intact/symmetrical  C7: Intact/symmetrical  C8: Intact/symmetrical  T1: Intact/symmetrical  T2:  Intact/symmetrical    UE Myotomes:  Cervical Lateral Flexion C3: Left 5/5, Right 5/5  Scapular Elevation C4: Left 5/5, Right 5/5  Shoulder Abduction C5: Left 5/5, Right 5/5  Elbow Flexion C6: Left 5/5, Right 5/5  Elbow Extension C7: Left 5/5, Right 5/5  Thumb Extension C8: Left 5/5, Right 5/5  Finger Abduction T1: Left 5/5, Right 5/5     Strength: Left 96#; Right 99#    DTRs:  Biceps Brachii (C5): Left 2+, Right 2+  Brachioradialis (C6): Left 2+, Right 2+  Triceps (C7): Left 1+, Right 1+    Cervical/Thoracic PAIVMs: Deferred 2* to time constraints     Spurling compression: (+)  Cervical distraction: (+) in neutral and in left SB  ULTT-A (median n.): (+) on right  Quadrant: (+)    Palpation: mild TTP about bilateral UTs    FOTO: 63        Flowsheet Rows      Flowsheet Row Most Recent Value   PT/OT G-Codes    Current Score 63   Projected Score 74               Diagnosis: Neck pain with right sided radiculopathy    Precautions: anxiety, schizophrenia    POC Expires: 4/11/24   Re-evaluation Date: 3/14/24   FOTO Scores/Date: Goal - 74; 2/15 - 63   Visit Count 1/10       Manuals 2/15       Cervical traction KD in neutral and in left SB       Median nerve glides KD               Ther Ex 2/15       Cervical ROT w/ towel NV       Seated thoracic ext NV       UT stretch NV                                                       Neuro Re-Ed                                                               Ther Act                                                                                 Modalities

## 2024-02-21 ENCOUNTER — OFFICE VISIT (OUTPATIENT)
Dept: PHYSICAL THERAPY | Facility: CLINIC | Age: 34
End: 2024-02-21
Payer: MEDICARE

## 2024-02-21 DIAGNOSIS — M54.2 ACUTE NECK PAIN: Primary | ICD-10-CM

## 2024-02-21 PROCEDURE — 97140 MANUAL THERAPY 1/> REGIONS: CPT | Performed by: PHYSICAL THERAPIST

## 2024-02-21 PROCEDURE — 97110 THERAPEUTIC EXERCISES: CPT | Performed by: PHYSICAL THERAPIST

## 2024-02-21 PROCEDURE — 97112 NEUROMUSCULAR REEDUCATION: CPT | Performed by: PHYSICAL THERAPIST

## 2024-02-21 NOTE — PROGRESS NOTES
"Daily Note     Today's date: 2024  Patient name: Jose Herring  : 1990  MRN: [id number]  Referring provider: Ulisses Palomares MD  Dx:   Encounter Diagnosis     ICD-10-CM    1. Acute neck pain  M54.2           Start Time: 845  Stop Time: 925  Total time in clinic (min): 40 minutes    Subjective: Pt reports improved symptoms after evaluation but only lasted a few days.      Objective: See treatment diary below      Assessment: Pt responds favorably to manual interventions and able to abolish tingling sensations in right arm with traction interventions, good tolerance noted with median nerve glides and improved nerve mobility after completion. Introduced passive and AA cervical rotation with good tolerance and notable improvement in quality/quantity of motion. Introduced seated thoracic extension but cues to avoid excessive neck extension during completion to isolate to thoracic spine with improved tolerance, c/o increased right sided tingling if performed with excessive neck extension and advised to ensure proper form with this at home. Introduced scapular re-education in prone with good tolerance and frequent cues required for proper scapular mechanics and notable fatigue, however no increase in pain/radicular sx noted. Updated HEP was issued and encouraged to continue with daily as symptoms allow. Will progress next visit as able.     Plan: Continue per plan of care.  Progress treatment as tolerated.       Diagnosis: Neck pain with right sided radiculopathy    Precautions: anxiety, schizophrenia    POC Expires: 24   Re-evaluation Date: 3/14/24   FOTO Scores/Date: Goal - 74; 2/15 -    Visit Count 1/10 2/10      Manuals 2/15 2/21      Cervical traction KD in neutral and in left SB KD in neutral and in left SB      Passive cervical ROT to right  KD      Median nerve glides KD KD              Ther Ex 2/15 2/21      Cervical ROT w/ towel NV 10x3\"      Seated thoracic ext NV 15x3\"       UT stretch NV " "3x30\"                                       Pt education  KD      HEP  Creation/instruction      Neuro Re-Ed  2/21      UBE (retro) for postural re-education/strength  L1 x 5 min      Prone scapular retraction/depression  10x10\"      Prone scapular retraction/depression w/ lift off   10x10\"                                     Ther Act                                                                                 Modalities                                            "

## 2024-02-23 ENCOUNTER — OFFICE VISIT (OUTPATIENT)
Dept: PHYSICAL THERAPY | Facility: CLINIC | Age: 34
End: 2024-02-23
Payer: MEDICARE

## 2024-02-23 DIAGNOSIS — M54.2 ACUTE NECK PAIN: Primary | ICD-10-CM

## 2024-02-23 PROCEDURE — 97112 NEUROMUSCULAR REEDUCATION: CPT | Performed by: PHYSICAL THERAPIST

## 2024-02-23 PROCEDURE — 97110 THERAPEUTIC EXERCISES: CPT | Performed by: PHYSICAL THERAPIST

## 2024-02-23 PROCEDURE — 97140 MANUAL THERAPY 1/> REGIONS: CPT | Performed by: PHYSICAL THERAPIST

## 2024-02-23 NOTE — PROGRESS NOTES
"Daily Note     Today's date: 2024  Patient name: Jose Herring  : 1990  MRN: [id number]  Referring provider: Ulisses Palomares MD  Dx:   Encounter Diagnosis     ICD-10-CM    1. Acute neck pain  M54.2           Start Time: 08  Stop Time: 930  Total time in clinic (min): 43 minutes    Subjective: Pt reports no current neck pain or radicular sx, reports overall decreased frequency of right sided tingling since enrolling in PT.       Objective: See treatment diary below      Assessment: Pt continues to respond well to manual interventions with significant improvement in left median nerve mobility today, continued relief noted with cervical traction but most beneficial when performed in cervical flex and left side bend. Pt remains easily fatigued with prone scapular re-education exercises and continued cues issued for proper scapular mechanics, able to further challenge scapular strength with addition of TB rows/pulldowns with good tolerance and no pain, only muscle fatigue. Pt continues to progress well towards functional goals with decreased pain and frequency/intensity of radicular sx. Will continue to progress per POC.      Plan: Continue per plan of care.  Progress treatment as tolerated.       Diagnosis: Neck pain with right sided radiculopathy    Precautions: anxiety, schizophrenia    POC Expires: 24   Re-evaluation Date: 3/14/24   FOTO Scores/Date: Goal - 74; 2/15 - 63   Visit Count 1/10 2/10 3/10     Manuals 2/15 2/21 2/23     Cervical traction KD in neutral and in left SB KD in neutral and in left SB KD in neutral and in left SB     Passive cervical ROT to right  KD KD     Median nerve glides KD KD KD             Ther Ex 2/15 2/21 2/23     Cervical ROT w/ towel NV 10x3\" 10x3\"      Seated thoracic ext NV 15x3\"  15x3\"     UT stretch NV 3x30\"  3x30\"                                      Pt education  KD KD     HEP  Creation/instruction Updated     Neuro Re-Ed       UBE (retro) for " "postural re-education/strength  L1 x 5 min L1 x 5 min     Prone scapular retraction/depression  10x10\" 10x10\"     Prone scapular retraction/depression w/ lift off   10x10\" 10x10\"     Rows/pulldowns   Grn 2x10 ea                            Ther Act                                                                                 Modalities                                              "

## 2024-02-28 ENCOUNTER — OFFICE VISIT (OUTPATIENT)
Dept: PHYSICAL THERAPY | Facility: CLINIC | Age: 34
End: 2024-02-28
Payer: MEDICARE

## 2024-02-28 DIAGNOSIS — M54.2 ACUTE NECK PAIN: Primary | ICD-10-CM

## 2024-02-28 PROCEDURE — 97140 MANUAL THERAPY 1/> REGIONS: CPT

## 2024-02-28 PROCEDURE — 97110 THERAPEUTIC EXERCISES: CPT

## 2024-02-28 PROCEDURE — 97112 NEUROMUSCULAR REEDUCATION: CPT

## 2024-02-28 NOTE — PROGRESS NOTES
"Daily Note     Today's date: 2024  Patient name: Jose Herring  : 1990  MRN: [id number]  Referring provider: Ulisses Palomares MD  Dx:   Encounter Diagnosis     ICD-10-CM    1. Acute neck pain  M54.2           Start Time: 845  Stop Time: 926  Total time in clinic (min): 41 minutes    Subjective: patient reports having improved R sided radicular symptoms.  Reports having BL UE and LE \"tremors\"  reports at rest lasting up to 5 mins      Objective: See treatment diary below      Assessment:  patient was able to complete therapy without incident.  Muscular quivering present with prone lift off.   Required rest periods due to fatigue.  Cueing throughout on sequencing of exercises along with positioning, mechanics, and rep/hold counts.  Education on seeking further medical attention if tremors persist or worsen.       Plan: Continue per plan of care.  Progress treatment as tolerated.       Diagnosis: Neck pain with right sided radiculopathy    Precautions: anxiety, schizophrenia    POC Expires: 24   Re-evaluation Date: 3/14/24   FOTO Scores/Date: Goal - 74; 2/15 -    Visit Count 1/10 2/10 3/10 4/10    Manuals 2/15 2/21 2/23 2/28    Cervical traction KD in neutral and in left SB KD in neutral and in left SB KD in neutral and in left SB     Passive cervical ROT to right  KD KD     Median nerve glides KD KD KD             Ther Ex 2/15 2/21 2/23 2/28    Cervical ROT w/ towel NV 10x3\" 10x3\"  3\"x10    Seated thoracic ext NV 15x3\"  15x3\" 3\"x15    UT stretch NV 3x30\"  3x30\"  30\"x3                                    Pt education  KD KD     HEP  Creation/instruction Updated     Neuro Re-Ed      UBE (retro) for postural re-education/strength  L1 x 5 min L1 x 5 min L1 x 5 mins    Prone scapular retraction/depression  10x10\" 10x10\" 10\"x10    Prone scapular retraction/depression w/ lift off   10x10\" 10x10\" 10\"x10    Rows/pulldowns   Grn 2x10 ea GTB 2x10 ea                           Ther Act           "                                                                       Modalities

## 2024-03-01 ENCOUNTER — OFFICE VISIT (OUTPATIENT)
Dept: PHYSICAL THERAPY | Facility: CLINIC | Age: 34
End: 2024-03-01
Payer: MEDICARE

## 2024-03-01 DIAGNOSIS — M54.2 ACUTE NECK PAIN: Primary | ICD-10-CM

## 2024-03-01 PROCEDURE — 97140 MANUAL THERAPY 1/> REGIONS: CPT | Performed by: PHYSICAL THERAPIST

## 2024-03-01 PROCEDURE — 97110 THERAPEUTIC EXERCISES: CPT | Performed by: PHYSICAL THERAPIST

## 2024-03-01 PROCEDURE — 97112 NEUROMUSCULAR REEDUCATION: CPT | Performed by: PHYSICAL THERAPIST

## 2024-03-01 NOTE — PROGRESS NOTES
Daily Note     Today's date: 3/1/2024  Patient name: Jose Herring  : 1990  MRN: [id number]  Referring provider: Ulisses Palomares MD  Dx:   Encounter Diagnosis     ICD-10-CM    1. Acute neck pain  M54.2           Start Time: 930  Stop Time: 1016  Total time in clinic (min): 46 minutes    Subjective: Pt reports decreased frequency of newly reported tremors since last visit but does still feel they are occurring intermittently, denies any other new onset of symptoms. Pt overall feels neck pain and tingling sensations are improving, had tingling yesterday but was short lived. Pt denies any current tingling.       Objective: See treatment diary below      Assessment: Pt continues to respond well to cervical traction with good reduction in neck pain, however decreased right sided cervical rotation mobility secondary to onset of right arm tingling that lingers but able to abolish with cervical traction. Pt continues to demonstrate improved median nerve mobility and minimal provocation with median nerve glides currently. Progressed scapular strengthening with addition of bilateral horizontal abd and ER with good tolerance but cues for correction of scapular mechanics and eccentric control. Pt denies any neck pain or radicular sx post tx. HEP was updated and reviewed, discussed calling PCP and scheduling follow up to discuss new onset of tremor symptoms.       Plan: Continue per plan of care.  Progress treatment as tolerated.       Diagnosis: Neck pain with right sided radiculopathy    Precautions: anxiety, schizophrenia    POC Expires: 24   Re-evaluation Date: 3/14/24   FOTO Scores/Date: Goal - 74; 2/15 -    Visit Count 1/10 2/10 3/10 4/10 5/10   Manuals 2/15 2/21 2/23 2/28 3/1   Cervical traction KD in neutral and in left SB KD in neutral and in left SB KD in neutral and in left SB  KD in neutral and in left SB   Passive cervical ROT to right  KD KD  KD   Median nerve glides KD KD KD  KD           Ther Ex  "2/15 2/21 2/23 2/28 3/1   Cervical ROT w/ towel NV 10x3\" 10x3\"  3\"x10 10x3\"   Seated thoracic ext NV 15x3\"  15x3\" 3\"x15 15x3\"   UT stretch NV 3x30\"  3x30\"  30\"x3 3x30\"                                   Pt education  KD KD  KD   HEP  Creation/instruction Updated  Updated   Neuro Re-Ed  2/21 2/23 2/28 3/1   UBE (retro) for postural re-education/strength  L1 x 5 min L1 x 5 min L1 x 5 mins L1 x 5 min   Prone scapular retraction/depression  10x10\" 10x10\" 10\"x10 10x10\"   Prone scapular retraction/depression w/ lift off   10x10\" 10x10\" 10\"x10 10x10\"    Rows/pulldowns   Grn 2x10 ea GTB 2x10 ea Grn 2x10   Bilateral horizontal abd     Red 2x10   Bilateral ER     Red 2x10                  Ther Act                                                                                 Modalities                                                  "

## 2024-03-06 ENCOUNTER — OFFICE VISIT (OUTPATIENT)
Dept: PHYSICAL THERAPY | Facility: CLINIC | Age: 34
End: 2024-03-06
Payer: MEDICARE

## 2024-03-06 DIAGNOSIS — M54.2 ACUTE NECK PAIN: Primary | ICD-10-CM

## 2024-03-06 PROCEDURE — 97140 MANUAL THERAPY 1/> REGIONS: CPT | Performed by: PHYSICAL THERAPIST

## 2024-03-06 PROCEDURE — 97112 NEUROMUSCULAR REEDUCATION: CPT | Performed by: PHYSICAL THERAPIST

## 2024-03-06 NOTE — PROGRESS NOTES
"Daily Note     Today's date: 3/6/2024  Patient name: Jose Herring  : 1990  MRN: [id number]  Referring provider: Ulisses Palomares MD  Dx:   Encounter Diagnosis     ICD-10-CM    1. Acute neck pain  M54.2           Start Time: 1015  Stop Time: 1103  Total time in clinic (min): 48 minutes    Subjective: Pt reports 0/10 currently, continues to note decreased frequency of N/T into his right arm and feels he is progressing very well in therapy. Pt also reports he hasn't experienced recent \"tremor\" sensation in about a week or so.      Objective: See treatment diary below      Assessment: Pt continues to respond favorably to ROM exercises but still c/o mild provocation of right sided tingling with cervical towel ROT to the right, however able to abolish with manual cervical traction and stays abolished for remainder of session. Able to complete passive cervical ROT to right manually without provoking tingling sensations. Pt remains easily fatigued with scapular strengthening and remains reliant on cues for correction of scapular mechanics and eccentric control throughout, able to increase resistance with rows/pulldowns without compromising form or provoking pain. Pt denies increase in neck pain or radicular sx post tx, only muscle fatigue. Will progress next visit as able.       Plan: Continue per plan of care.  Progress treatment as tolerated.       Diagnosis: Neck pain with right sided radiculopathy    Precautions: anxiety, schizophrenia    POC Expires: 24   Re-evaluation Date: 3/14/24   FOTO Scores/Date: Goal - 74; 2/15 -    Visit Count 6/10 2/10 3/10 4/10 5/10   Manuals 3/6 2/21 2/23 2/28 3/1   Cervical traction KD in neutral and in left SB KD in neutral and in left SB KD in neutral and in left SB  KD in neutral and in left SB   Passive cervical ROT to right KD KD KD  KD   Median nerve glides KD KD KD  KD           Ther Ex 3/6 2/21 2/23 2/28 3/1   Cervical ROT w/ towel 10x3\" 10x3\" 10x3\"  3\"x10 10x3\" " "  Seated thoracic ext 15x3\"  15x3\"  15x3\" 3\"x15 15x3\"   UT stretch 3x30\"  3x30\"  3x30\"  30\"x3 3x30\"                                   Pt education KD KD KD  KD   HEP Updated  Creation/instruction Updated  Updated   Neuro Re-Ed 3/6 2/21 2/23 2/28 3/1   UBE (retro) for postural re-education/strength L1 x 5 min L1 x 5 min L1 x 5 min L1 x 5 mins L1 x 5 min   Prone scapular retraction/depression 10x10\" 10x10\" 10x10\" 10\"x10 10x10\"   Prone scapular retraction/depression w/ lift off  10x10\" 10x10\" 10x10\" 10\"x10 10x10\"    Rows/pulldowns Blue 2x10 ea  Grn 2x10 ea GTB 2x10 ea Grn 2x10   Bilateral horizontal abd Red 2x10    Red 2x10   Bilateral ER Red 2x10    Red 2x10                  Ther Act                                                                                 Modalities                                                    "

## 2024-03-08 ENCOUNTER — OFFICE VISIT (OUTPATIENT)
Dept: PHYSICAL THERAPY | Facility: CLINIC | Age: 34
End: 2024-03-08
Payer: MEDICARE

## 2024-03-08 DIAGNOSIS — M54.2 ACUTE NECK PAIN: Primary | ICD-10-CM

## 2024-03-08 PROCEDURE — 97110 THERAPEUTIC EXERCISES: CPT | Performed by: PHYSICAL THERAPIST

## 2024-03-08 NOTE — PROGRESS NOTES
PT Discharge    Today's date: 3/8/2024  Patient name: Jose Herring  : 1990  MRN: [id number]  Referring provider: Ulisses Palomares MD  Dx:   Encounter Diagnosis     ICD-10-CM    1. Acute neck pain  M54.2           Start Time: 930  Stop Time: 954  Total time in clinic (min): 24 minutes    Assessment  Assessment details: Pt has been seen for 7 visits and has made excellent subjective/objective improvements since enrolling in therapy, however FOTO score unchanged from 63 at initial evaluation. Despite no change in FOTO score, pt exhibits significant improvement in cervical ROM,  strength, activity tolerance, and decreased frequency/intensity of tingling. Pt has returned to all normal ADLs and recreational activities without pain or limitation and is appropriate for discharge to home exercise program at this time. Pt encouraged to continue with HEP on regular basis per tolerance and was educated on how to progress/regress exercises per symptoms/tolerance. Pt is in agreement with plan.      Impairments: abnormal muscle tone, abnormal or restricted ROM, activity intolerance, impaired physical strength, lacks appropriate home exercise program, pain with function, poor posture  and poor body mechanics    Goals  ST weeks  Pt will restore full and pain-free cervical spine AROM to allow return to normal ADLs MET  Pt will demonstrate centralization of symptoms with repeated movements and/or traction of cervical spine MET  Pt will decrease neck pain to 3-4/10 with activity MET  Pt will demonstrate good understanding and compliance with HEP MET  Pt will demonstrate improved postural awareness and ability to self-correct without reliance on external cues MET    LT weeks  Pt will demonstrate abolished radicular symptoms for 2 weeks NOT MET  Pt will decrease neck pain to 0-1/10 MET  Pt will exhibit proper lifting mechanics without reliance on external cues for correction of form  MET  Pt will be able to tolerate  "prolonged standing/sitting activities > 30 minutes without provocation of neck pain MET  Pt will improve FOTO score to > or = to 74 to indicate improved functional abilities NOT MET      Plan  Plan details: Discharge to [facility]  Patient would benefit from: PT eval and skilled physical therapy  Planned modality interventions: cryotherapy and thermotherapy: hydrocollator packs  Planned therapy interventions: ADL training, activity modification, joint mobilization, manual therapy, neuromuscular re-education, patient education, postural training, self care, strengthening, stretching, therapeutic activities, therapeutic exercise, home exercise program, graded activity, graded exercise, body mechanics training, functional ROM exercises and flexibility  Plan of Care beginning date: 2/15/2024  Plan of Care expiration date: 4/11/2024  Treatment plan discussed with: patient        Subjective Evaluation    History of Present Illness  Mechanism of injury: DISCHARGE:   Pt reports overall good improvement in his neck pain and tingling sensations, still experiencing intermittent tingling with end ranges of neck motion but occurs significantly less often and less intense. Pt feels his neck pain is essentially fully abolished. Due to improving pain/function and co-insurance discussed discharge to home program.       IE:   Pt reports to PT with c/o neck pain that started about 2-3 months ago without antecedent trauma. Pt reports his neck pain started and eventually progressed to intermittent right arm N/T that radiates to his fingers. Pt denies hx of similar sx. Pt was seen in the ED on 2/2 and then PCP on 2/5 where he was given muscle relaxer and lidocaine patches. Pt denies using muscle relaxer but does use lidocaine patches with transient relief. Pt admits to having tinnitus in his ears and feelings of \"tremors\" in his chest. Pt reports seeing ENT earlier today and told his exam was normal but going for a hearing test in upcoming " weeks. Pt reports increased neck pain with all movements, however states decreased pain with sitting still and sleeping at night. Pt had CT at that revealed C6-C7 central canal stenosis, right foraminal stenosis.     Pt denies hx of cancer, unrelenting night pain, saddle anesthesia, unexplained weight loss, changes in B&B function, changes in balance/gait, recent fever, hx of IV drug use, prolonged corticosteroid use, hx of osteoporosis, recent trauma.    Pt denies nausea, dizziness, diplopia, drop attacks. Pt denies changes in speech, does report to some changes with his ability to swallow certain foods but saw PCP and diagnosed with GERD, instructed to use TUMS and finds this is helping so far.     Imaging: CT scan (24)  C6-7 posterior disc osteophyte complex causes moderate central canal stenosis, and possibly severe right neural foraminal stenosis    Aggravating factors: neck movements    Easing Factors:  Rest, lidocaine patches,     PLOF:  No hx of similar sx, previously fully functional and pain-free    PMH: Anxiety, schizophrenia   Pain  Current pain ratin  At best pain ratin  At worst pain ratin    Hand dominance: right      Diagnostic Tests  CT scan: abnormal        Objective     General Comments:      Cervical/Thoracic Comments  Pt ambulates unassisted with normal gait mechanics     Posture: FHP, RS, increased thoracic kyphosis      Cervical AROM:   Flex: 50*  Ext: 35*  Left ROT: 60*  Right ROT: 60*     UE Dermatomes:  C2: Intact/symmetrical  C3: Intact/symmetrical  C4: Intact/symmetrical  C5: Intact/symmetrical  C6: Intact/symmetrical  C7: Intact/symmetrical  C8: Intact/symmetrical  T1: Intact/symmetrical  T2: Intact/symmetrical    UE Myotomes:  Cervical Lateral Flexion C3: Left 5/5, Right 5/5  Scapular Elevation C4: Left 5/5, Right 5/5  Shoulder Abduction C5: Left 5/5, Right 5/5  Elbow Flexion C6: Left 5/5, Right 5/5  Elbow Extension C7: Left 5/5, Right 5/5  Thumb Extension C8: Left 5/5,  "Right 5/5  Finger Abduction T1: Left 5/5, Right 5/5     Strength: Left 110#; Right 120#    DTRs:  Biceps Brachii (C5): Left 2+, Right 2+  Brachioradialis (C6): Left 2+, Right 2+  Triceps (C7): Left 1+, Right 1+    FOTO: 63                 Diagnosis: Neck pain with right sided radiculopathy    Precautions: anxiety, schizophrenia    POC Expires: 4/11/24   Re-evaluation Date: 3/14/24   FOTO Scores/Date: Goal - 74; 2/15 - 63; 3/8 - 63   Visit Count 6/10 7/10 3/10 4/10 5/10   Manuals 3/6 3/8 2/23 2/28 3/1   Cervical traction KD in neutral and in left SB  KD in neutral and in left SB  KD in neutral and in left SB   Passive cervical ROT to right KD  KD  KD   Median nerve glides KD  KD  KD           Ther Ex 3/6 3/8 2/23 2/28 3/1   Cervical ROT w/ towel 10x3\"  10x3\"  3\"x10 10x3\"   Seated thoracic ext 15x3\"   15x3\" 3\"x15 15x3\"   UT stretch 3x30\"   3x30\"  30\"x3 3x30\"                                   Pt education KD  KD  KD   HEP Updated  Re-assessed neck pain and FOTO; HEP creation/instruction, educated on importance of continued HEP compliance and how to progress/regress as needed  Updated  Updated   Neuro Re-Ed 3/6 3/8 2/23 2/28 3/1   UBE (retro) for postural re-education/strength L1 x 5 min  L1 x 5 min L1 x 5 mins L1 x 5 min   Prone scapular retraction/depression 10x10\"  10x10\" 10\"x10 10x10\"   Prone scapular retraction/depression w/ lift off  10x10\"  10x10\" 10\"x10 10x10\"    Rows/pulldowns Blue 2x10 ea  Grn 2x10 ea GTB 2x10 ea Grn 2x10   Bilateral horizontal abd Red 2x10    Red 2x10   Bilateral ER Red 2x10    Red 2x10                  Ther Act                                                                                 Modalities                                          "

## 2024-03-20 PROBLEM — Z72.0 TOBACCO ABUSE: Status: RESOLVED | Noted: 2021-09-21 | Resolved: 2024-03-20

## 2024-03-21 ENCOUNTER — OFFICE VISIT (OUTPATIENT)
Age: 34
End: 2024-03-21
Payer: MEDICARE

## 2024-03-21 VITALS
OXYGEN SATURATION: 98 % | RESPIRATION RATE: 16 BRPM | WEIGHT: 198 LBS | HEIGHT: 68 IN | HEART RATE: 86 BPM | DIASTOLIC BLOOD PRESSURE: 80 MMHG | BODY MASS INDEX: 30.01 KG/M2 | SYSTOLIC BLOOD PRESSURE: 126 MMHG

## 2024-03-21 DIAGNOSIS — G47.33 OSA (OBSTRUCTIVE SLEEP APNEA): ICD-10-CM

## 2024-03-21 DIAGNOSIS — R73.01 IMPAIRED FASTING GLUCOSE: ICD-10-CM

## 2024-03-21 DIAGNOSIS — E78.5 HYPERLIPIDEMIA, UNSPECIFIED HYPERLIPIDEMIA TYPE: ICD-10-CM

## 2024-03-21 DIAGNOSIS — M54.12 CERVICAL RADICULOPATHY: ICD-10-CM

## 2024-03-21 DIAGNOSIS — K21.9 GASTROESOPHAGEAL REFLUX DISEASE WITHOUT ESOPHAGITIS: ICD-10-CM

## 2024-03-21 DIAGNOSIS — Z12.12 SCREENING FOR COLORECTAL CANCER: ICD-10-CM

## 2024-03-21 DIAGNOSIS — F20.9 SCHIZOPHRENIA, UNSPECIFIED TYPE (HCC): ICD-10-CM

## 2024-03-21 DIAGNOSIS — L85.3 DRY SKIN: ICD-10-CM

## 2024-03-21 DIAGNOSIS — Z12.11 SCREENING FOR COLORECTAL CANCER: ICD-10-CM

## 2024-03-21 DIAGNOSIS — Z00.00 ANNUAL PHYSICAL EXAM: Primary | ICD-10-CM

## 2024-03-21 PROCEDURE — G0439 PPPS, SUBSEQ VISIT: HCPCS

## 2024-03-21 NOTE — PROGRESS NOTES
INTERNAL MEDICINE FOLLOW-UP VISIT  [facility] - [facility] INTERNAL MEDICINE [address]    NAME: Jose Herring  AGE: 33 y.o. SEX: male  : 1990     DATE: 3/21/2024     Assessment and Plan:   1. Annual physical exam  He started eating more fruits, veggies, and lean meats. He drinks at least 6 water bottles a day.  He denies any formal exercise, recommended moderate intensity or size 30 minutes 5 times a week.  He sleeps well.  He denies any alcohol, tobacco, illicit drug use.  He is up-to-date with a dentist and eye doctor.  He currently is not working.     2. ARELI (obstructive sleep apnea)  He has CPAP, but returned it because once he stopped smoking he stopped snoring.  BP in office is stable.     3. Gastroesophageal reflux disease without esophagitis  He was on pantoprazole 20 mg for 4 weeks without breakthrough symptoms.  Discussed stopping it, and if he starts experiencing symptoms again to notify me.  Recommended sitting upright 30 minutes after eating, refrain from late night snacking, and avoiding foods like red sauce, caffeine, and spicy foods.  May be beneficial to have an EGD to rule out any source of taste of blood in mouth.    4. Impaired fasting glucose  Due for CMP and A1c.     5. Schizophrenia, unspecified type (HCC)  He follows with a therapist once a week.     6. Hyperlipidemia, unspecified hyperlipidemia type  Due for lipid panel.     7. Screening for colorectal cancer  He previously had a colonoscopy for hemorrhoids, and they found polyps.  Unable to find the records, but it is showing that he is due.  Discussed referral to GI in which he agreed.    8. Cervical radiculopathy  CT cervical spine from  showed C6-7 posterior disc osteophyte complex causes moderate central canal stenosis and possibly severe right neural foraminal stenosis. He was going to physical therapy which provided relief, he is doing the exercises at home which helps.    9. Dry skin  Discussed obtaining  Eucerin lotion and using it after the shower.  Shower in lukewarm water.  Use humidifier in room.        No follow-ups on file.       Chief Complaint:     Chief Complaint   Patient presents with    GERD    Rash      History of Present Illness:   Patient is a 33-year-old male that presents today for his annual physical exam.  He notes his acid reflux has been much better since being on the pantoprazole, but he does admit tasting blood.  He notes coughed up blood a few times a couple weeks ago.  It has not happened again since.  He denies any upper respiratory symptoms prior to this.  He denies any family history of esophageal cancer.    Health maintenance:  Due for colonoscopy, labs.  Family history of colon cancer.    The following portions of the patient's history were reviewed and updated as appropriate: allergies, current medications, past family history, past medical history, past social history, past surgical history and problem list.     Review of Systems:     Review of Systems   Constitutional:  Negative for chills, fatigue, fever and unexpected weight change.   HENT:  Positive for rhinorrhea. Negative for sore throat, trouble swallowing and voice change.    Respiratory:  Negative for cough, shortness of breath and wheezing.    Cardiovascular:  Negative for chest pain.   Gastrointestinal:  Negative for abdominal pain, constipation, diarrhea, nausea and vomiting.   Genitourinary:  Negative for difficulty urinating and dysuria.   Musculoskeletal:  Negative for arthralgias and myalgias.   Skin:  Positive for rash.   Neurological:  Negative for dizziness, light-headedness and headaches.        Past Medical History:     Past Medical History:   Diagnosis Date    Anxiety     GERD (gastroesophageal reflux disease)     H. pylori infection     Hematochezia     Hypercholesteremia     Hyperlipidemia     Psychosis (HCC)     Last Assessed:  11/19/15    Schizophrenia ([facility])         Current Medications:     Current Outpatient  "Medications:     multivitamin (THERAGRAN) TABS, Take 1 tablet by mouth daily, Disp: , Rfl:     pantoprazole (PROTONIX) 20 mg tablet, Take 1 tablet (20 mg total) by mouth daily before breakfast, Disp: 60 tablet, Rfl: 0    lidocaine (Lidoderm) 5 %, Apply 1 patch topically over 12 hours daily for 5 days Remove & Discard patch within 12 hours or as directed by MD (Patient not taking: Reported on 2/12/2024), Disp: 5 patch, Rfl: 0    methocarbamol (ROBAXIN) 500 mg tablet, Take 3 tablets (1,500 mg total) by mouth 3 (three) times a day for 3 days (Patient not taking: Reported on 2/12/2024), Disp: 27 tablet, Rfl: 0    OLANZapine (ZyPREXA) 15 mg tablet, Take 15 mg by mouth daily (Patient not taking: Reported on 2/5/2024), Disp: , Rfl:      Allergies:   No Known Allergies     Physical Exam:     /80 (BP Location: Left arm, Patient Position: Sitting, Cuff Size: Standard)   Pulse 86   Resp 16   Ht 5' 8\" (1.727 m)   Wt 89.8 kg (198 lb)   SpO2 98%   BMI 30.11 kg/m²     Physical Exam  Vitals and nursing note reviewed.   Constitutional:       General: He is awake.      Appearance: Normal appearance. He is well-developed, well-groomed and overweight.   HENT:      Head: Normocephalic and atraumatic.      Right Ear: Hearing and external ear normal.      Left Ear: Hearing and external ear normal.      Nose: Nose normal.      Mouth/Throat:      Lips: Pink.      Mouth: Mucous membranes are moist.      Pharynx: Uvula midline. No pharyngeal swelling, posterior oropharyngeal erythema or uvula swelling.   Eyes:      General: Lids are normal. Vision grossly intact. Gaze aligned appropriately.      Conjunctiva/sclera: Conjunctivae normal.   Neck:      Vascular: No carotid bruit.      Trachea: Trachea and phonation normal.   Cardiovascular:      Rate and Rhythm: Normal rate and regular rhythm.      Heart sounds: Normal heart sounds, S1 normal and S2 normal. No murmur heard.     No friction rub. No gallop.   Pulmonary:      Effort: " Pulmonary effort is normal.      Breath sounds: Normal breath sounds and air entry. No decreased breath sounds, wheezing, rhonchi or rales.   Abdominal:      General: Abdomen is protuberant.   Musculoskeletal:      Cervical back: Neck supple.      Right lower leg: No edema.      Left lower leg: No edema.   Skin:     General: Skin is warm.      Capillary Refill: Capillary refill takes less than 2 seconds.      Comments: Bilateral hands with dry skin.    Neurological:      Mental Status: He is alert.   Psychiatric:         Attention and Perception: Attention and perception normal.         Mood and Affect: Mood and affect normal.         Speech: Speech normal.         Behavior: Behavior normal. Behavior is cooperative.         Thought Content: Thought content normal.         Cognition and Memory: Cognition and memory normal.         Judgment: Judgment normal.           Data:     Laboratory Results: I have personally reviewed the pertinent laboratory results/reports   Radiology/Other Diagnostic Testing Results: I have personally reviewed pertinent reports.      Brittny Croft PA-C  [facility] INTERNAL MEDICINE [address]

## 2024-03-22 NOTE — PROGRESS NOTES
Assessment and Plan:     Problem List Items Addressed This Visit       Gastroesophageal reflux disease    Relevant Orders    Ambulatory Referral to Gastroenterology    Hyperlipidemia    Impaired fasting glucose    Schizophrenia (HCC)    ARELI (obstructive sleep apnea)    Cervical radiculopathy     Other Visit Diagnoses       Annual physical exam    -  Primary    Screening for colorectal cancer        Dry skin                 Preventive health issues were discussed with patient, and age appropriate screening tests were ordered as noted in patient's After Visit Summary.  Personalized health advice and appropriate referrals for health education or preventive services given if needed, as noted in patient's After Visit Summary.     History of Present Illness:     Patient presents for a Medicare Wellness Visit    Rash       Patient Care Team:  Ulisses Palomares MD as PCP - General  MD Filippo Morris MD as Endoscopist     Review of Systems:     Review of Systems   Skin:  Positive for rash.        Problem List:     Patient Active Problem List   Diagnosis    Gastroesophageal reflux disease    Hyperlipidemia    Impaired fasting glucose    Obesity (BMI 30-39.9)    Schizophrenia (HCC)    Non-alcoholic fatty liver disease    Toe swelling    Psychiatric disorder    MRSA (methicillin resistant staph aureus) culture positive    ARELI (obstructive sleep apnea)    Sleep related hypoxia    Dyslipidemia (high LDL; low HDL)    Chronic paranoid schizophrenia (HCC)    Elevated blood pressure reading    Cervical radiculopathy      Past Medical and Surgical History:     Past Medical History:   Diagnosis Date    Anxiety     GERD (gastroesophageal reflux disease)     H. pylori infection     Hematochezia     Hypercholesteremia     Hyperlipidemia     Psychosis (HCC)     Last Assessed:  11/19/15    Schizophrenia (HCC)      Past Surgical History:   Procedure Laterality Date    COLONOSCOPY      NC COLONOSCOPY FLX DX W/COLLJ SPEC  WHEN PFRMD N/A 2017    Procedure: COLONOSCOPY;  Surgeon: Filippo Rodgers MD;  Location: [facility];  Service: Gastroenterology      Family History:     Family History   Problem Relation Age of Onset    Hypertension Family     Cancer Family       Social History:     Social History     Socioeconomic History    Marital status: Single     Spouse name: None    Number of children: None    Years of education: None    Highest education level: None   Occupational History    None   Tobacco Use    Smoking status: Former     Current packs/day: 0.00     Average packs/day: 0.3 packs/day for 13.2 years (3.3 ttl pk-yrs)     Types: Cigarettes     Start date:      Quit date: 2023     Years since quittin.9    Smokeless tobacco: Never    Tobacco comments:     2-3 cigarettes a day   Vaping Use    Vaping status: Never Used   Substance and Sexual Activity    Alcohol use: Yes     Comment: occ    Drug use: No    Sexual activity: None   Other Topics Concern    None   Social History Narrative    None     Social Determinants of Health     Financial Resource Strain: Low Risk  (2023)    Overall Financial Resource Strain (CARDIA)     Difficulty of Paying Living Expenses: Not hard at all   Food Insecurity: Not on file   Transportation Needs: No Transportation Needs (2023)    PRAPARE - Transportation     Lack of Transportation (Medical): No     Lack of Transportation (Non-Medical): No   Physical Activity: Inactive (3/15/2021)    Exercise Vital Sign     Days of Exercise per Week: 0 days     Minutes of Exercise per Session: 0 min   Stress: No Stress Concern Present (3/15/2021)    [employer] [place] [employer] - Occupational Stress Questionnaire     Feeling of Stress : Not at all   Social Connections: Not on file   Intimate Partner Violence: Not on file   Housing Stability: Not on file      Medications and Allergies:     Current Outpatient Medications   Medication Sig Dispense Refill    multivitamin (THERAGRAN)  TABS Take 1 tablet by mouth daily      pantoprazole (PROTONIX) 20 mg tablet Take 1 tablet (20 mg total) by mouth daily before breakfast 60 tablet 0     No current facility-administered medications for this visit.     No Known Allergies   Immunizations:     Immunization History   Administered Date(s) Administered    COVID-19 PFIZER VACCINE 0.3 ML IM 07/09/2021, 07/30/2021    COVID-19 Pfizer vac (Hi-sucrose, gray cap) 12 yr+ IM 03/25/2022    DTP 1990, 1990, 1990, 11/12/1992, 11/01/1997    Hep B, Adolescent or Pediatric 07/29/1997, 09/02/2004, 04/07/2005    Hepatitis A 07/29/1997    HiB 11/02/1992    INFLUENZA 11/19/2015, 10/31/2017, 10/13/2022    Influenza Quadrivalent, 6-35 Months IM 11/19/2015, 10/31/2017    Influenza, injectable, quadrivalent, preservative free 0.5 mL 09/21/2021, 10/13/2022, 12/07/2023    MMR 04/15/1992, 09/02/2004    OPV 1990, 1990, 1990, 11/02/1992, 11/01/1997    Td (adult), adsorbed 06/25/2004    Tdap 03/15/2021    Varicella 09/02/2004, 04/07/2005      Health Maintenance:         Topic Date Due    Colorectal Cancer Screening  09/27/2022    HIV Screening  Completed    Hepatitis C Screening  Completed         Topic Date Due    Hepatitis A Vaccine (2 of 2 - 2-dose series) 01/29/1998    COVID-19 Vaccine (4 - 2023-24 season) 09/01/2023      Medicare Screening Tests and Risk Assessments:     Jose is here for his Subsequent Wellness visit.     Health Risk Assessment:   Patient rates overall health as good. Patient feels that their physical health rating is same. Patient is satisfied with their life. Eyesight was rated as same. Hearing was rated as same. Patient feels that their emotional and mental health rating is same. Patients states they are never, rarely angry. Patient states they are never, rarely unusually tired/fatigued. Pain experienced in the last 7 days has been none. Patient states that he has experienced no weight loss or gain in last 6 months.      Depression Screening:   PHQ-2 Score: 0      Fall Risk Screening:   In the past year, patient has experienced: no history of falling in past year      Home Safety:  Patient does not have trouble with stairs inside or outside of their home. Patient has working smoke alarms and has no working carbon monoxide detector. Home safety hazards include: none.     Nutrition:   Current diet is Regular.     Medications:   Patient is currently taking over-the-counter supplements. OTC medications include: see medication list. Patient is not able to manage medications.     Activities of Daily Living (ADLs)/Instrumental Activities of Daily Living (IADLs):   Walk and transfer into and out of bed and chair?: Yes  Dress and groom yourself?: Yes    Bathe or shower yourself?: Yes    Feed yourself? Yes  Do your laundry/housekeeping?: Yes  Manage your money, pay your bills and track your expenses?: Yes  Make your own meals?: Yes    Do your own shopping?: Yes    Previous Hospitalizations:   Any hospitalizations or ED visits within the last 12 months?: No      Advance Care Planning:   Living will: No    Durable POA for healthcare: No    Advanced directive: No      PREVENTIVE SCREENINGS      Cardiovascular Screening:    General: Screening Not Indicated and History Lipid Disorder      Diabetes Screening:     General: Screening Current      Prostate Cancer Screening:    General: Screening Not Indicated      Abdominal Aortic Aneurysm (AAA) Screening:    Risk factors include: tobacco use        Lung Cancer Screening:     General: Screening Not Indicated      Hepatitis C Screening:    General: Screening Current    Screening, Brief Intervention, and Referral to Treatment (SBIRT)    Screening  Typical number of drinks in a day: 0  Typical number of drinks in a week: 0  Interpretation: Low risk drinking behavior.    Single Item Drug Screening:  How often have you used an illegal drug (including marijuana) or a prescription medication for  "non-medical reasons in the past year? never    Single Item Drug Screen Score: 0  Interpretation: Negative screen for possible drug use disorder    No results found.     Physical Exam:     /80 (BP Location: Left arm, Patient Position: Sitting, Cuff Size: Standard)   Pulse 86   Resp 16   Ht 5' 8\" (1.727 m)   Wt 89.8 kg (198 lb)   SpO2 98%   BMI 30.11 kg/m²     Physical Exam     Brittny Croft PA-C  "

## 2024-03-22 NOTE — PATIENT INSTRUCTIONS
Medicare Preventive Visit Patient Instructions  Thank you for completing your Welcome to Medicare Visit or Medicare Annual Wellness Visit today. Your next wellness visit will be due in one year (3/23/2025).  The screening/preventive services that you may require over the next 5-10 years are detailed below. Some tests may not apply to you based off risk factors and/or age. Screening tests ordered at today's visit but not completed yet may show as past due. Also, please note that scanned in results may not display below.  Preventive Screenings:  Service Recommendations Previous Testing/Comments   Colorectal Cancer Screening  Colonoscopy    Fecal Occult Blood Test (FOBT)/Fecal Immunochemical Test (FIT)  Fecal DNA/Cologuard Test  Flexible Sigmoidoscopy Age: 45-75 years old   Colonoscopy: every 10 years (May be performed more frequently if at higher risk)  OR  FOBT/FIT: every 1 year  OR  Cologuard: every 3 years  OR  Sigmoidoscopy: every 5 years  Screening may be recommended earlier than age 45 if at higher risk for colorectal cancer. Also, an individualized decision between you and your healthcare provider will decide whether screening between the ages of 76-85 would be appropriate. Colonoscopy: 09/27/2017  FOBT/FIT: Not on file  Cologuard: Not on file  Sigmoidoscopy: Not on file          Prostate Cancer Screening Individualized decision between patient and health care provider in men between ages of 55-69   Medicare will cover every 12 months beginning on the day after your 50th birthday PSA: No results in last 5 years     Screening Not Indicated     Hepatitis C Screening Once for adults born between 1945 and 1965  More frequently in patients at high risk for Hepatitis C Hep C Antibody: 02/27/2021    Screening Current   Diabetes Screening 1-2 times per year if you're at risk for diabetes or have pre-diabetes Fasting glucose: 112 mg/dL (3/17/2023)  A1C: 5.8 % (3/17/2023)  Screening Current   Cholesterol Screening Once  every 5 years if you don't have a lipid disorder. May order more often based on risk factors. Lipid panel: 11/23/2023  Screening Not Indicated  History Lipid Disorder      Other Preventive Screenings Covered by Medicare:  Abdominal Aortic Aneurysm (AAA) Screening: covered once if your at risk. You're considered to be at risk if you have a family history of AAA or a male between the age of 65-75 who smoking at least 100 cigarettes in your lifetime.  Lung Cancer Screening: covers low dose CT scan once per year if you meet all of the following conditions: (1) Age 55-77; (2) No signs or symptoms of lung cancer; (3) Current smoker or have quit smoking within the last 15 years; (4) You have a tobacco smoking history of at least 20 pack years (packs per day x number of years you smoked); (5) You get a written order from a healthcare provider.  Glaucoma Screening: covered annually if you're considered high risk: (1) You have diabetes OR (2) Family history of glaucoma OR (3)  aged 50 and older OR (4)  American aged 65 and older  Osteoporosis Screening: covered every 2 years if you meet one of the following conditions: (1) Have a vertebral abnormality; (2) On glucocorticoid therapy for more than 3 months; (3) Have primary hyperparathyroidism; (4) On osteoporosis medications and need to assess response to drug therapy.  HIV Screening: covered annually if you're between the age of 15-65. Also covered annually if you are younger than 15 and older than 65 with risk factors for HIV infection. For pregnant patients, it is covered up to 3 times per pregnancy.    Immunizations:  Immunization Recommendations   Influenza Vaccine Annual influenza vaccination during flu season is recommended for all persons aged >= 6 months who do not have contraindications   Pneumococcal Vaccine   * Pneumococcal conjugate vaccine = PCV13 (Prevnar 13), PCV15 (Vaxneuvance), PCV20 (Prevnar 20)  * Pneumococcal polysaccharide vaccine  = PPSV23 (Pneumovax) Adults 19-63 yo with certain risk factors or if 65+ yo  If never received any pneumonia vaccine: recommend Prevnar 20 (PCV20)  Give PCV20 if previously received 1 dose of PCV13 or PPSV23   Hepatitis B Vaccine 3 dose series if at intermediate or high risk (ex: diabetes, end stage renal disease, liver disease)   Respiratory syncytial virus (RSV) Vaccine - COVERED BY MEDICARE PART D  * RSVPreF3 (Arexvy) CDC recommends that adults 60 years of age and older may receive a single dose of RSV vaccine using shared clinical decision-making (SCDM)   Tetanus (Td) Vaccine - COST NOT COVERED BY MEDICARE PART B Following completion of primary series, a booster dose should be given every 10 years to maintain immunity against tetanus. Td may also be given as tetanus wound prophylaxis.   Tdap Vaccine - COST NOT COVERED BY MEDICARE PART B Recommended at least once for all adults. For pregnant patients, recommended with each pregnancy.   Shingles Vaccine (Shingrix) - COST NOT COVERED BY MEDICARE PART B  2 shot series recommended in those 19 years and older who have or will have weakened immune systems or those 50 years and older     Health Maintenance Due:      Topic Date Due   • Colorectal Cancer Screening  09/27/2022   • HIV Screening  Completed   • Hepatitis C Screening  Completed     Immunizations Due:      Topic Date Due   • Hepatitis A Vaccine (2 of 2 - 2-dose series) 01/29/1998   • COVID-19 Vaccine (4 - 2023-24 season) 09/01/2023     Advance Directives   What are advance directives?  Advance directives are legal documents that state your wishes and plans for medical care. These plans are made ahead of time in case you lose your ability to make decisions for yourself. Advance directives can apply to any medical decision, such as the treatments you want, and if you want to donate organs.   What are the types of advance directives?  There are many types of advance directives, and each state has rules about how  to use them. You may choose a combination of any of the following:  Living will:  This is a written record of the treatment you want. You can also choose which treatments you do not want, which to limit, and which to stop at a certain time. This includes surgery, medicine, IV fluid, and tube feedings.   Durable power of  for healthcare (DPAHC):  This is a written record that states who you want to make healthcare choices for you when you are unable to make them for yourself. This person, called a proxy, is usually a family member or a friend. You may choose more than 1 proxy.  Do not resuscitate (DNR) order:  A DNR order is used in case your heart stops beating or you stop breathing. It is a request not to have certain forms of treatment, such as CPR. A DNR order may be included in other types of advance directives.  Medical directive:  This covers the care that you want if you are in a coma, near death, or unable to make decisions for yourself. You can list the treatments you want for each condition. Treatment may include pain medicine, surgery, blood transfusions, dialysis, IV or tube feedings, and a ventilator (breathing machine).  Values history:  This document has questions about your views, beliefs, and how you feel and think about life. This information can help others choose the care that you would choose.  Why are advance directives important?  An advance directive helps you control your care. Although spoken wishes may be used, it is better to have your wishes written down. Spoken wishes can be misunderstood, or not followed. Treatments may be given even if you do not want them. An advance directive may make it easier for your family to make difficult choices about your care.   Weight Management   Why it is important to manage your weight:  Being overweight increases your risk of health conditions such as heart disease, high blood pressure, type 2 diabetes, and certain types of cancer. It can also  increase your risk for osteoarthritis, sleep apnea, and other respiratory problems. Aim for a slow, steady weight loss. Even a small amount of weight loss can lower your risk of health problems.  How to lose weight safely:  A safe and healthy way to lose weight is to eat fewer calories and get regular exercise. You can lose up about 1 pound a week by decreasing the number of calories you eat by 500 calories each day.   Healthy meal plan for weight management:  A healthy meal plan includes a variety of foods, contains fewer calories, and helps you stay healthy. A healthy meal plan includes the following:  Eat whole-grain foods more often.  A healthy meal plan should contain fiber. Fiber is the part of grains, fruits, and vegetables that is not broken down by your body. Whole-grain foods are healthy and provide extra fiber in your diet. Some examples of whole-grain foods are whole-wheat breads and pastas, oatmeal, brown rice, and bulgur.  Eat a variety of vegetables every day.  Include dark, leafy greens such as spinach, kale, paul greens, and mustard greens. Eat yellow and orange vegetables such as carrots, sweet potatoes, and winter squash.   Eat a variety of fruits every day.  Choose fresh or canned fruit (canned in its own juice or light syrup) instead of juice. Fruit juice has very little or no fiber.  Eat low-fat dairy foods.  Drink fat-free (skim) milk or 1% milk. Eat fat-free yogurt and low-fat cottage cheese. Try low-fat cheeses such as mozzarella and other reduced-fat cheeses.  Choose meat and other protein foods that are low in fat.  Choose beans or other legumes such as split peas or lentils. Choose fish, skinless poultry (chicken or turkey), or lean cuts of red meat (beef or pork). Before you cook meat or poultry, cut off any visible fat.   Use less fat and oil.  Try baking foods instead of frying them. Add less fat, such as margarine, sour cream, regular salad dressing and mayonnaise to foods. Eat  fewer high-fat foods. Some examples of high-fat foods include french fries, doughnuts, ice cream, and cakes.  Eat fewer sweets.  Limit foods and drinks that are high in sugar. This includes candy, cookies, regular soda, and sweetened drinks.  Exercise:  Exercise at least 30 minutes per day on most days of the week. Some examples of exercise include walking, biking, dancing, and swimming. You can also fit in more physical activity by taking the stairs instead of the elevator or parking farther away from stores. Ask your healthcare provider about the best exercise plan for you.      © Copyright [employer] 2018 Information is for End User's use only and may not be sold, redistributed or otherwise used for commercial purposes. All illustrations and images included in CareNotes® are the copyrighted property of [employer]M., Inc[employer] or [employer]

## 2024-04-05 ENCOUNTER — OFFICE VISIT (OUTPATIENT)
Age: 34
End: 2024-04-05
Payer: MEDICARE

## 2024-04-05 VITALS
DIASTOLIC BLOOD PRESSURE: 84 MMHG | HEIGHT: 68 IN | BODY MASS INDEX: 30.01 KG/M2 | HEART RATE: 88 BPM | SYSTOLIC BLOOD PRESSURE: 130 MMHG | OXYGEN SATURATION: 100 % | WEIGHT: 198 LBS

## 2024-04-05 DIAGNOSIS — J02.9 PHARYNGITIS, UNSPECIFIED ETIOLOGY: Primary | ICD-10-CM

## 2024-04-05 PROCEDURE — 99213 OFFICE O/P EST LOW 20 MIN: CPT

## 2024-04-05 PROCEDURE — G2211 COMPLEX E/M VISIT ADD ON: HCPCS

## 2024-04-05 RX ORDER — PREDNISONE 10 MG/1
TABLET ORAL
Qty: 28 TABLET | Refills: 0 | Status: SHIPPED | OUTPATIENT
Start: 2024-04-05

## 2024-04-05 NOTE — PROGRESS NOTES
INTERNAL MEDICINE FOLLOW-UP VISIT  [facility] - [facility] INTERNAL MEDICINE [address]    NAME: Jose Herring  AGE: 34 y.o. SEX: male  : 1990     DATE: 2024     Assessment and Plan:   1. Pharyngitis, unspecified etiology  Most likely related to allergies and acid reflux.  Discussed using a daily antihistamine like Zyrtec or Claritin and starting a prednisone taper.  Side effects discussed.  Continue increasing fluid intake.  If symptoms do not subside with daily antihistamine and prednisone, notify the office.  - predniSONE 10 mg tablet; Take 4 tablets for 3 days then 3 tablets for 3 days then 2 tablet for 3 days 1 for 1 day  Dispense: 28 tablet; Refill: 0        No follow-ups on file.       Chief Complaint:     Chief Complaint   Patient presents with    Sore Throat      History of Present Illness:   Patient is a 34-year-old male that presents today for sore throat. This started a few months ago, but has been worst the past month.  He notes it is worse in the morning.  He admits to postnasal drip and rhinorrhea as well.  He has been drinking a lot of water which helps, but salt water gargles sometimes make his throat bleed.  He thinks the pantoprazole might have provided some relief when he was on it.  He denies any worsening acid reflux symptoms.  He denies any tobacco use, but he previously smoked for 3 years, alcohol use, unexpected weight loss, fevers, chills, night sweats.  He does have a history of ARELI, but believes it resolved once he stopped smoking.    The following portions of the patient's history were reviewed and updated as appropriate: allergies, current medications, past family history, past medical history, past social history, past surgical history and problem list.     Review of Systems:     Review of Systems   Constitutional:  Negative for appetite change, chills, fatigue and fever.   HENT:  Positive for postnasal drip, rhinorrhea and sore throat. Negative for ear  "discharge, ear pain, sinus pressure, sinus pain and trouble swallowing.    Eyes:  Negative for pain and itching.   Respiratory:  Negative for cough, shortness of breath and wheezing.    Cardiovascular:  Negative for chest pain.   Gastrointestinal:  Negative for abdominal pain, constipation, diarrhea, nausea and vomiting.   Genitourinary:  Negative for difficulty urinating and dysuria.   Musculoskeletal:  Negative for arthralgias and myalgias.   Skin:  Negative for rash.   Neurological:  Negative for dizziness, light-headedness and headaches.        Past Medical History:     Past Medical History:   Diagnosis Date    Anxiety     GERD (gastroesophageal reflux disease)     H. pylori infection     Hematochezia     Hypercholesteremia     Hyperlipidemia     Psychosis (HCC)     Last Assessed:  11/19/15    Schizophrenia ([facility])         Current Medications:     Current Outpatient Medications:     multivitamin (THERAGRAN) TABS, Take 1 tablet by mouth daily, Disp: , Rfl:     pantoprazole (PROTONIX) 20 mg tablet, Take 1 tablet (20 mg total) by mouth daily before breakfast, Disp: 60 tablet, Rfl: 0     Allergies:   No Known Allergies     Physical Exam:     /84   Pulse 88   Ht 5' 8\" (1.727 m)   Wt 89.8 kg (198 lb)   SpO2 100%   BMI 30.11 kg/m²     Physical Exam  Vitals and nursing note reviewed.   Constitutional:       General: He is awake.      Appearance: Normal appearance. He is well-developed, well-groomed and overweight.   HENT:      Head: Normocephalic and atraumatic.      Right Ear: Hearing, tympanic membrane, ear canal and external ear normal.      Left Ear: Hearing, tympanic membrane, ear canal and external ear normal.      Nose: Nose normal.      Mouth/Throat:      Lips: Pink.      Mouth: Mucous membranes are moist.      Pharynx: Uvula midline. Posterior oropharyngeal erythema present.      Tonsils: No tonsillar exudate or tonsillar abscesses. 2+ on the right. 2+ on the left.   Eyes:      General: Lids are " normal. Vision grossly intact. Gaze aligned appropriately.      Conjunctiva/sclera: Conjunctivae normal.   Neck:      Vascular: No carotid bruit.      Trachea: Trachea and phonation normal.   Cardiovascular:      Rate and Rhythm: Normal rate and regular rhythm.      Heart sounds: Normal heart sounds, S1 normal and S2 normal. No murmur heard.     No friction rub. No gallop.   Pulmonary:      Effort: Pulmonary effort is normal.      Breath sounds: Normal breath sounds and air entry. No decreased breath sounds, wheezing, rhonchi or rales.   Abdominal:      General: Abdomen is protuberant.   Musculoskeletal:      Cervical back: Neck supple.      Right lower leg: No edema.      Left lower leg: No edema.   Lymphadenopathy:      Cervical: No cervical adenopathy.   Skin:     General: Skin is warm.      Capillary Refill: Capillary refill takes less than 2 seconds.   Neurological:      Mental Status: He is alert.   Psychiatric:         Attention and Perception: Attention and perception normal.         Mood and Affect: Mood and affect normal.         Speech: Speech normal.         Behavior: Behavior normal. Behavior is cooperative.         Thought Content: Thought content normal.         Cognition and Memory: Cognition and memory normal.         Judgment: Judgment normal.         Data:     Laboratory Results: I have personally reviewed the pertinent laboratory results/reports   Radiology/Other Diagnostic Testing Results: I have personally reviewed pertinent reports.      Brittny Croft PA-C  [facility] INTERNAL MEDICINE [address]

## 2024-04-11 ENCOUNTER — APPOINTMENT (EMERGENCY)
Dept: RADIOLOGY | Facility: HOSPITAL | Age: 34
End: 2024-04-11
Payer: MEDICARE

## 2024-04-11 ENCOUNTER — HOSPITAL ENCOUNTER (EMERGENCY)
Facility: HOSPITAL | Age: 34
Discharge: HOME | End: 2024-04-11
Attending: EMERGENCY MEDICINE
Payer: MEDICARE

## 2024-04-11 VITALS
DIASTOLIC BLOOD PRESSURE: 87 MMHG | TEMPERATURE: 98.8 F | SYSTOLIC BLOOD PRESSURE: 126 MMHG | RESPIRATION RATE: 18 BRPM | OXYGEN SATURATION: 100 % | HEART RATE: 90 BPM

## 2024-04-11 DIAGNOSIS — J02.9 SORE THROAT: Primary | ICD-10-CM

## 2024-04-11 DIAGNOSIS — R82.998 DARK URINE: ICD-10-CM

## 2024-04-11 LAB
ANION GAP SERPL CALCULATED.3IONS-SCNC: 9 MMOL/L (ref 4–13)
BASOPHILS # BLD AUTO: 0.03 THOUSANDS/ÂΜL (ref 0–0.1)
BASOPHILS NFR BLD AUTO: 0 % (ref 0–1)
BILIRUB UR QL STRIP: NEGATIVE
BUN SERPL-MCNC: 12 MG/DL (ref 5–25)
CALCIUM SERPL-MCNC: 9.4 MG/DL (ref 8.4–10.2)
CHLORIDE SERPL-SCNC: 103 MMOL/L (ref 96–108)
CLARITY UR: CLEAR
CO2 SERPL-SCNC: 25 MMOL/L (ref 21–32)
COLOR UR: COLORLESS
CREAT SERPL-MCNC: 1.13 MG/DL (ref 0.6–1.3)
EOSINOPHIL # BLD AUTO: 0.01 THOUSAND/ÂΜL (ref 0–0.61)
EOSINOPHIL NFR BLD AUTO: 0 % (ref 0–6)
ERYTHROCYTE [DISTWIDTH] IN BLOOD BY AUTOMATED COUNT: 11.9 % (ref 11.6–15.1)
GFR SERPL CREATININE-BSD FRML MDRD: 84 ML/MIN/1.73SQ M
GLUCOSE SERPL-MCNC: 92 MG/DL (ref 65–140)
GLUCOSE UR STRIP-MCNC: NEGATIVE MG/DL
HCT VFR BLD AUTO: 44.6 % (ref 36.5–49.3)
HGB BLD-MCNC: 15.6 G/DL (ref 12–17)
HGB UR QL STRIP.AUTO: NEGATIVE
IMM GRANULOCYTES # BLD AUTO: 0.02 THOUSAND/UL (ref 0–0.2)
IMM GRANULOCYTES NFR BLD AUTO: 0 % (ref 0–2)
KETONES UR STRIP-MCNC: NEGATIVE MG/DL
LEUKOCYTE ESTERASE UR QL STRIP: NEGATIVE
LYMPHOCYTES # BLD AUTO: 1.67 THOUSANDS/ÂΜL (ref 0.6–4.47)
LYMPHOCYTES NFR BLD AUTO: 22 % (ref 14–44)
MCH RBC QN AUTO: 29.7 PG (ref 26.8–34.3)
MCHC RBC AUTO-ENTMCNC: 35 G/DL (ref 31.4–37.4)
MCV RBC AUTO: 85 FL (ref 82–98)
MONOCYTES # BLD AUTO: 0.53 THOUSAND/ÂΜL (ref 0.17–1.22)
MONOCYTES NFR BLD AUTO: 7 % (ref 4–12)
NEUTROPHILS # BLD AUTO: 5.33 THOUSANDS/ÂΜL (ref 1.85–7.62)
NEUTS SEG NFR BLD AUTO: 71 % (ref 43–75)
NITRITE UR QL STRIP: NEGATIVE
NRBC BLD AUTO-RTO: 0 /100 WBCS
PH UR STRIP.AUTO: 7 [PH]
PLATELET # BLD AUTO: 298 THOUSANDS/UL (ref 149–390)
PMV BLD AUTO: 8.5 FL (ref 8.9–12.7)
POTASSIUM SERPL-SCNC: 3.7 MMOL/L (ref 3.5–5.3)
PROT UR STRIP-MCNC: NEGATIVE MG/DL
RBC # BLD AUTO: 5.25 MILLION/UL (ref 3.88–5.62)
S PYO DNA THROAT QL NAA+PROBE: NOT DETECTED
SODIUM SERPL-SCNC: 137 MMOL/L (ref 135–147)
SP GR UR STRIP.AUTO: 1 (ref 1–1.03)
UROBILINOGEN UR STRIP-ACNC: <2 MG/DL
WBC # BLD AUTO: 7.59 THOUSAND/UL (ref 4.31–10.16)

## 2024-04-11 PROCEDURE — 87651 STREP A DNA AMP PROBE: CPT | Performed by: PHYSICIAN ASSISTANT

## 2024-04-11 PROCEDURE — 99284 EMERGENCY DEPT VISIT MOD MDM: CPT | Performed by: PHYSICIAN ASSISTANT

## 2024-04-11 PROCEDURE — 36415 COLL VENOUS BLD VENIPUNCTURE: CPT | Performed by: PHYSICIAN ASSISTANT

## 2024-04-11 PROCEDURE — 71046 X-RAY EXAM CHEST 2 VIEWS: CPT

## 2024-04-11 PROCEDURE — 96360 HYDRATION IV INFUSION INIT: CPT

## 2024-04-11 PROCEDURE — 80048 BASIC METABOLIC PNL TOTAL CA: CPT | Performed by: PHYSICIAN ASSISTANT

## 2024-04-11 PROCEDURE — 81003 URINALYSIS AUTO W/O SCOPE: CPT | Performed by: PHYSICIAN ASSISTANT

## 2024-04-11 PROCEDURE — 85025 COMPLETE CBC W/AUTO DIFF WBC: CPT | Performed by: PHYSICIAN ASSISTANT

## 2024-04-11 PROCEDURE — 99283 EMERGENCY DEPT VISIT LOW MDM: CPT

## 2024-04-11 RX ADMIN — SODIUM CHLORIDE 1000 ML: 0.9 INJECTION, SOLUTION INTRAVENOUS at 11:29

## 2024-04-11 NOTE — ED PROVIDER NOTES
History  Chief Complaint   Patient presents with    Blood in Urine     Blood in urine since this morning, denies pain with urination. And c/o rash on hands and groin. Currently taking prednisolone      35yo male with a history of schizophrenia and GERD presenting for evaluation of hematuria. He reports having a sore throat and phlegm for the past week. He was seen by his PCP last week for these symptoms and was started on prednisone. He has taken 5 days of steroids without any improvement in his sore throat. His urine appeared dark this morning and he also noticed redness in the urine so he came to the ED. He denies any dysuria, abdominal pain, flank pain, fevers, vomiting.       History provided by:  Patient   used: No        Prior to Admission Medications   Prescriptions Last Dose Informant Patient Reported? Taking?   multivitamin (THERAGRAN) TABS  Self Yes No   Sig: Take 1 tablet by mouth daily   pantoprazole (PROTONIX) 20 mg tablet   No No   Sig: Take 1 tablet (20 mg total) by mouth daily before breakfast   predniSONE 10 mg tablet   No No   Sig: Take 4 tablets for 3 days then 3 tablets for 3 days then 2 tablet for 3 days 1 for 1 day      Facility-Administered Medications: None       Past Medical History:   Diagnosis Date    Anxiety     GERD (gastroesophageal reflux disease)     H. pylori infection     Hematochezia     Hypercholesteremia     Hyperlipidemia     Psychosis (HCC)     Last Assessed:  11/19/15    Schizophrenia (HCC)        Past Surgical History:   Procedure Laterality Date    COLONOSCOPY      WY COLONOSCOPY FLX DX W/COLLJ SPEC WHEN PFRMD N/A 9/27/2017    Procedure: COLONOSCOPY;  Surgeon: Filippo Rodgers MD;  Location: [facility];  Service: Gastroenterology       Family History   Problem Relation Age of Onset    Hypertension Family     Cancer Family      I have reviewed and agree with the history as documented.    E-Cigarette/Vaping    E-Cigarette Use Never User       E-Cigarette/Vaping Substances    Nicotine No     THC No     CBD No     Flavoring No     Other No     Unknown No      Social History     Tobacco Use    Smoking status: Former     Current packs/day: 0.00     Average packs/day: 0.3 packs/day for 13.2 years (3.3 ttl pk-yrs)     Types: Cigarettes     Start date:      Quit date: 2023     Years since quittin.0    Smokeless tobacco: Never    Tobacco comments:     2-3 cigarettes a day   Vaping Use    Vaping status: Never Used   Substance Use Topics    Alcohol use: Yes     Comment: occ    Drug use: No       Review of Systems   Constitutional:  Negative for chills and fever.   HENT:  Positive for congestion and sore throat. Negative for drooling and voice change.    Eyes:  Negative for discharge and redness.   Respiratory:  Negative for shortness of breath and stridor.    Cardiovascular:  Negative for chest pain and leg swelling.   Gastrointestinal:  Negative for abdominal pain and vomiting.   Genitourinary:  Positive for hematuria. Negative for dysuria, flank pain and testicular pain.   Musculoskeletal:  Negative for neck pain and neck stiffness.   Skin:  Negative for color change and rash.   Neurological:  Negative for seizures and syncope.   Psychiatric/Behavioral:  Negative for confusion. The patient is not nervous/anxious.    All other systems reviewed and are negative.      Physical Exam  Physical Exam  Vitals and nursing note reviewed.   Constitutional:       General: He is not in acute distress.     Appearance: He is well-developed. He is not diaphoretic.   HENT:      Head: Normocephalic and atraumatic.      Right Ear: External ear normal.      Left Ear: External ear normal.      Mouth/Throat:      Mouth: Mucous membranes are moist.      Pharynx: Oropharynx is clear. No oropharyngeal exudate or posterior oropharyngeal erythema.   Eyes:      General: No scleral icterus.        Right eye: No discharge.         Left eye: No discharge.       Conjunctiva/sclera: Conjunctivae normal.   Cardiovascular:      Rate and Rhythm: Normal rate and regular rhythm.      Heart sounds: Normal heart sounds. No murmur heard.  Pulmonary:      Effort: Pulmonary effort is normal. No respiratory distress.      Breath sounds: Normal breath sounds. No stridor. No wheezing or rales.   Abdominal:      General: There is no distension.      Palpations: Abdomen is soft.      Tenderness: There is no abdominal tenderness. There is no right CVA tenderness or guarding.   Musculoskeletal:         General: No deformity. Normal range of motion.      Cervical back: Normal range of motion and neck supple.   Skin:     General: Skin is warm and dry.   Neurological:      Mental Status: He is alert. He is not disoriented.      GCS: GCS eye subscore is 4. GCS verbal subscore is 5. GCS motor subscore is 6.   Psychiatric:         Behavior: Behavior normal.         Vital Signs  ED Triage Vitals [04/11/24 1022]   Temperature Pulse Respirations Blood Pressure SpO2   98.8 °F (37.1 °C) 90 18 126/87 100 %      Temp Source Heart Rate Source Patient Position - Orthostatic VS BP Location FiO2 (%)   Temporal Monitor Sitting Left arm --      Pain Score       --           Vitals:    04/11/24 1022   BP: 126/87   Pulse: 90   Patient Position - Orthostatic VS: Sitting         Visual Acuity      ED Medications  Medications   sodium chloride 0.9 % bolus 1,000 mL (0 mL Intravenous Stopped 4/11/24 1229)       Diagnostic Studies  Results Reviewed       Procedure Component Value Units Date/Time    Strep A PCR [[id number]]  (Normal) Collected: 04/11/24 1312    Lab Status: Final result Specimen: Throat Updated: 04/11/24 1343     STREP A PCR Not Detected    UA w Reflex to Microscopic w Reflex to Culture [[id number]] Collected: 04/11/24 1238    Lab Status: Final result Specimen: Urine, Clean Catch Updated: 04/11/24 1253     Color, UA Colorless     Clarity, UA Clear     Specific Gravity, UA 1.005     pH, UA 7.0      Leukocytes, UA Negative     Nitrite, UA Negative     Protein, UA Negative mg/dl      Glucose, UA Negative mg/dl      Ketones, UA Negative mg/dl      Urobilinogen, UA <2.0 mg/dl      Bilirubin, UA Negative     Occult Blood, UA Negative    Basic metabolic panel [[id number]] Collected: 04/11/24 1130    Lab Status: Final result Specimen: Blood from Arm, Right Updated: 04/11/24 1148     Sodium 137 mmol/L      Potassium 3.7 mmol/L      Chloride 103 mmol/L      CO2 25 mmol/L      ANION GAP 9 mmol/L      BUN 12 mg/dL      Creatinine 1.13 mg/dL      Glucose 92 mg/dL      Calcium 9.4 mg/dL      eGFR 84 ml/min/1.73sq m     Narrative:      National Kidney Disease Foundation guidelines for Chronic Kidney Disease (CKD):     Stage 1 with normal or high GFR (GFR > 90 mL/min/1.73 square meters)    Stage 2 Mild CKD (GFR = 60-89 mL/min/1.73 square meters)    Stage 3A Moderate CKD (GFR = 45-59 mL/min/1.73 square meters)    Stage 3B Moderate CKD (GFR = 30-44 mL/min/1.73 square meters)    Stage 4 Severe CKD (GFR = 15-29 mL/min/1.73 square meters)    Stage 5 End Stage CKD (GFR <15 mL/min/1.73 square meters)  Note: GFR calculation is accurate only with a steady state creatinine    CBC and differential [[id number]]  (Abnormal) Collected: 04/11/24 1130    Lab Status: Final result Specimen: Blood from Arm, Right Updated: 04/11/24 1135     WBC 7.59 Thousand/uL      RBC 5.25 Million/uL      Hemoglobin 15.6 g/dL      Hematocrit 44.6 %      MCV 85 fL      MCH 29.7 pg      MCHC 35.0 g/dL      RDW 11.9 %      MPV 8.5 fL      Platelets 298 Thousands/uL      nRBC 0 /100 WBCs      Segmented % 71 %      Immature Grans % 0 %      Lymphocytes % 22 %      Monocytes % 7 %      Eosinophils Relative 0 %      Basophils Relative 0 %      Absolute Neutrophils 5.33 Thousands/µL      Absolute Immature Grans 0.02 Thousand/uL      Absolute Lymphocytes 1.67 Thousands/µL      Absolute Monocytes 0.53 Thousand/µL      Eosinophils Absolute 0.01 Thousand/µL      Basophils  Absolute 0.03 Thousands/µL                    XR chest 2 views   ED Interpretation by Naz Maya PA-C (04/11 1205)   No acute abnormality      Final Result by Jerry Lance MD (04/12 1137)      No acute cardiopulmonary disease.            Workstation performed: [id number]                    Procedures  Procedures         ED Course               SBIRT 22yo+      Flowsheet Row Most Recent Value   Initial Alcohol Screen: US AUDIT-C     1. How often do you have a drink containing alcohol? 0 Filed at: 04/11/2024 1317   2. How many drinks containing alcohol do you have on a typical day you are drinking?  0 Filed at: 04/11/2024 1317   3a. Male UNDER 65: How often do you have five or more drinks on one occasion? 0 Filed at: 04/11/2024 1317   Audit-C Score 0 Filed at: 04/11/2024 1317   GUMARO: How many times in the past year have you...    Used an illegal drug or used a prescription medication for non-medical reasons? Never Filed at: 04/11/2024 1317                      Medical Decision Making  34yoM here after one episode of hematuria this morning. Also c/o sore throat x 1 week. Has been on prednisone without relief. No abdominal pain, flank pain, testicular pain. No fevers or vomiting. He is well appearing with stable vital signs. Exam is reassuring.    Initial ED plan: Check CBC, BMP, UA, and CXR. IV fluid bolus    Final assessment: Labs unremarkable including normal white count and renal function. Urine appeared clear and yellow per nursing staff. UA bland without hematuria or signs of infection. CXR is clear. He is stable for discharge. Strep testing added for completeness although bacterial pharyngitis is less likely. Advised close PCP follow-up. ED return precautions discussed. Patient expressed understanding and is agreeable to plan. Patient discharged in stable condition.        Problems Addressed:  Dark urine: acute illness or injury  Sore throat: acute illness or injury    Amount and/or Complexity of  Data Reviewed  Labs: ordered.  Radiology: ordered and independent interpretation performed.             Disposition  Final diagnoses:   Sore throat   Dark urine     Time reflects when diagnosis was documented in both MDM as applicable and the Disposition within this note       Time User Action Codes Description Comment    4/11/2024  1:08 PM Naz Maya Add [J02.9] Sore throat     4/11/2024  1:08 PM Naz Maya Add [R82.998] Dark urine           ED Disposition       ED Disposition   Discharge    Condition   Stable    Date/Time   u Apr 11, 2024 1307    Comment   Jose Nevaeh discharge to home/self care.                   Follow-up Information       Follow up With Specialties Details Why Contact Info Additional Information    Ulisses Palomares MD Internal Medicine, Hospice Services, Palliative Care Schedule an appointment as soon as possible for a visit   [address]  [address]  [facility] [address]  [phone]       [facility] Emergency Medicine  If symptoms worsen [address] [id number] [phone] [facility], [address], [place], [address]            Discharge Medication List as of 4/11/2024  1:08 PM        CONTINUE these medications which have NOT CHANGED    Details   multivitamin (THERAGRAN) TABS Take 1 tablet by mouth daily, Historical Med      pantoprazole (PROTONIX) 20 mg tablet Take 1 tablet (20 mg total) by mouth daily before breakfast, Starting Mon 2/12/2024, Until Sat 8/10/2024, Normal      predniSONE 10 mg tablet Take 4 tablets for 3 days then 3 tablets for 3 days then 2 tablet for 3 days 1 for 1 day, Normal             No discharge procedures on file.    PDMP Review       None            ED Provider  Electronically Signed by             Naz Maya PA-C  04/12/24 4259

## 2024-04-18 ENCOUNTER — OFFICE VISIT (OUTPATIENT)
Age: 34
End: 2024-04-18
Payer: MEDICARE

## 2024-04-18 ENCOUNTER — APPOINTMENT (OUTPATIENT)
Dept: LAB | Facility: HOSPITAL | Age: 34
End: 2024-04-18
Payer: MEDICARE

## 2024-04-18 VITALS
DIASTOLIC BLOOD PRESSURE: 60 MMHG | WEIGHT: 198 LBS | HEART RATE: 84 BPM | BODY MASS INDEX: 30.01 KG/M2 | HEIGHT: 68 IN | RESPIRATION RATE: 18 BRPM | SYSTOLIC BLOOD PRESSURE: 96 MMHG | OXYGEN SATURATION: 96 %

## 2024-04-18 DIAGNOSIS — J02.9 PHARYNGITIS, UNSPECIFIED ETIOLOGY: Primary | ICD-10-CM

## 2024-04-18 DIAGNOSIS — R21 RASH: ICD-10-CM

## 2024-04-18 DIAGNOSIS — Z13.29 SCREENING FOR THYROID DISORDER: ICD-10-CM

## 2024-04-18 DIAGNOSIS — J02.9 PHARYNGITIS, UNSPECIFIED ETIOLOGY: ICD-10-CM

## 2024-04-18 LAB — TSH SERPL DL<=0.05 MIU/L-ACNC: 0.87 UIU/ML (ref 0.45–4.5)

## 2024-04-18 PROCEDURE — 36415 COLL VENOUS BLD VENIPUNCTURE: CPT

## 2024-04-18 PROCEDURE — G2211 COMPLEX E/M VISIT ADD ON: HCPCS

## 2024-04-18 PROCEDURE — 86308 HETEROPHILE ANTIBODY SCREEN: CPT

## 2024-04-18 PROCEDURE — 99213 OFFICE O/P EST LOW 20 MIN: CPT

## 2024-04-18 PROCEDURE — 84443 ASSAY THYROID STIM HORMONE: CPT

## 2024-04-18 RX ORDER — AMOXICILLIN 500 MG/1
500 CAPSULE ORAL EVERY 8 HOURS SCHEDULED
Qty: 30 CAPSULE | Refills: 0 | Status: SHIPPED | OUTPATIENT
Start: 2024-04-18 | End: 2024-04-28

## 2024-04-18 RX ORDER — TRIAMCINOLONE ACETONIDE 5 MG/G
CREAM TOPICAL 3 TIMES DAILY
Qty: 15 G | Refills: 3 | Status: SHIPPED | OUTPATIENT
Start: 2024-04-18

## 2024-04-18 NOTE — PROGRESS NOTES
INTERNAL MEDICINE FOLLOW-UP VISIT  [facility] - [facility] INTERNAL MEDICINE [address]    NAME: Jose Herring  AGE: 34 y.o. SEX: male  : 1990     DATE: 2024     Assessment and Plan:   1. Pharyngitis, unspecified etiology  Recent BMP, strep culture, UA, and CBC were stable. Prednisone and pantoprazole had not helped, will start amoxicillin x 10 days.  Bad taste in the back of his throat is most likely due to tonsil stones.  He also admits to an ongoing rash for the past couple weeks.  He admits to a history dry skin.  Increase fluid intake and use tylenol or ibuprofen prn for pain. Will obtain mono test. Discussed f/up on Monday with how his throat feels. If you begin to experience any rash, fevers >103, chills, worsening sore throat, or trouble swallowing, notify the office.  He may benefit from an ENT evaluation.  - Mononucleosis screen; Future  - amoxicillin (AMOXIL) 500 mg capsule; Take 1 capsule (500 mg total) by mouth every 8 (eight) hours for 10 days  Dispense: 30 capsule; Refill: 0    2. Rash  Most likely due to winter's itch. Discussed doing a one-to-one ratio of Eucerin to triamcinolone cream after shower. Apply hydrocortisone to groin area, now triamcinolone. Avoid very hot showers, and try to shower every other day.  Use humidifier in room.  If rash gets worse, notify the office.  - triamcinolone (KENALOG) 0.5 % cream; Apply topically 3 (three) times a day  Dispense: 15 g; Refill: 3        No follow-ups on file.     Chief Complaint:     Chief Complaint   Patient presents with    Follow-up     Sore throat       History of Present Illness:   Patient is a 34-year-old male that presents today for ongoing sore throat and rash.  Notes his sore throat has been going on for few months.  He had tried pantoprazole with no relief.  He admits to some trouble swallowing and a runny nose.  He was on prednisone for 7 days with no relief.  He presented to the ER  where strep test was  done and it was negative and labs were unremarkable.  He also admits to having a bad taste in the back of his mouth.    He also admits to a rash on his bilateral hands, arms, legs, and groin.  This has been going on for a few months.  Admits to a history of dry skin.  The rash is itchy, but not painful.  He has been using hydrocortisone cream, soap, and water no relief.  Denies any fevers or chills.  He does admit to  taking hot showers and not having a humidifier in his house.    The following portions of the patient's history were reviewed and updated as appropriate: allergies, current medications, past family history, past medical history, past social history, past surgical history and problem list.     Review of Systems:     Review of Systems   Constitutional:  Positive for chills. Negative for appetite change, fatigue and fever.   HENT:  Positive for rhinorrhea, sore throat and trouble swallowing (Mild). Negative for ear discharge, ear pain, postnasal drip, sinus pressure and sinus pain.    Eyes:  Positive for pain.   Respiratory:  Negative for cough, shortness of breath and wheezing.    Cardiovascular:  Negative for chest pain.   Gastrointestinal:  Positive for abdominal distention and diarrhea. Negative for abdominal pain, blood in stool, constipation, nausea and vomiting.   Genitourinary:  Negative for difficulty urinating and hematuria.   Musculoskeletal:  Negative for arthralgias and myalgias.   Skin:  Positive for rash.   Neurological:  Negative for dizziness, light-headedness and headaches.        Past Medical History:     Past Medical History:   Diagnosis Date    Anxiety     GERD (gastroesophageal reflux disease)     H. pylori infection     Hematochezia     Hypercholesteremia     Hyperlipidemia     Psychosis (HCC)     Last Assessed:  11/19/15    Schizophrenia ([facility])         Current Medications:     Current Outpatient Medications:     multivitamin (THERAGRAN) TABS, Take 1 tablet by mouth daily, Disp: ,  "Rfl:      Allergies:   No Known Allergies     Physical Exam:     BP 96/60 (BP Location: Left arm, Patient Position: Sitting, Cuff Size: Standard)   Pulse 84   Resp 18   Ht 5' 8\" (1.727 m)   Wt 89.8 kg (198 lb)   SpO2 96%   BMI 30.11 kg/m²     Physical Exam  Vitals and nursing note reviewed.   Constitutional:       General: He is awake.      Appearance: Normal appearance. He is well-developed, well-groomed and overweight.   HENT:      Head: Normocephalic and atraumatic.      Right Ear: Hearing, tympanic membrane, ear canal and external ear normal.      Left Ear: Hearing, tympanic membrane, ear canal and external ear normal.      Nose: Nose normal.      Mouth/Throat:      Lips: Pink.      Mouth: Mucous membranes are moist.      Pharynx: Uvula midline. Posterior oropharyngeal erythema present. No oropharyngeal exudate.      Tonsils: No tonsillar exudate or tonsillar abscesses.   Eyes:      General: Lids are normal. Vision grossly intact. Gaze aligned appropriately.      Conjunctiva/sclera: Conjunctivae normal.   Neck:      Vascular: No carotid bruit.      Trachea: Trachea and phonation normal.   Cardiovascular:      Rate and Rhythm: Normal rate and regular rhythm.      Heart sounds: Normal heart sounds, S1 normal and S2 normal. No murmur heard.     No friction rub. No gallop.   Pulmonary:      Effort: Pulmonary effort is normal.      Breath sounds: Normal breath sounds and air entry. No decreased breath sounds, wheezing, rhonchi or rales.   Abdominal:      General: Abdomen is protuberant.   Musculoskeletal:      Cervical back: Neck supple.      Right lower leg: No edema.      Left lower leg: No edema.   Skin:     General: Skin is warm.      Capillary Refill: Capillary refill takes less than 2 seconds.      Comments: Dried, crack skin on bilateral hand, arms, legs, and groin.    Neurological:      Mental Status: He is alert.   Psychiatric:         Attention and Perception: Attention and perception normal.         " Mood and Affect: Mood and affect normal.         Speech: Speech normal.         Behavior: Behavior normal. Behavior is cooperative.         Thought Content: Thought content normal.         Cognition and Memory: Cognition and memory normal.         Judgment: Judgment normal.           Data:     Laboratory Results: I have personally reviewed the pertinent laboratory results/reports   Radiology/Other Diagnostic Testing Results: I have personally reviewed pertinent reports.      Brittny Croft PA-C  [facility] INTERNAL MEDICINE [address]

## 2024-04-19 ENCOUNTER — TELEPHONE (OUTPATIENT)
Age: 34
End: 2024-04-19

## 2024-04-19 LAB — HETEROPH AB SER QL: NEGATIVE

## 2024-04-19 NOTE — TELEPHONE ENCOUNTER
----- Message from Brittny Croft PA-C sent at 4/19/2024  8:16 AM EDT -----  Mono is negative, please start the ABX

## 2024-04-26 ENCOUNTER — TELEPHONE (OUTPATIENT)
Age: 34
End: 2024-04-26

## 2024-04-26 DIAGNOSIS — R13.10 DYSPHAGIA, UNSPECIFIED TYPE: Primary | ICD-10-CM

## 2024-04-26 NOTE — TELEPHONE ENCOUNTER
I want him to be seen by GI, I placed the referral.  Please have him make an appointment with them.  There is not any additional medications at this time.

## 2024-04-26 NOTE — TELEPHONE ENCOUNTER
Pt c/o throat infection and was prescribed Amoxicillin.  Pt has been taking it for about 8-9 days and still no better. Asking if you want to prescribe something else for him?     [facility]

## 2024-04-26 NOTE — TELEPHONE ENCOUNTER
Called and a detailed message that he is to see GI, referral is in the chart. Provided phone number for him to schedule appt [phone]. Also Brittny can not prescribe more medication at this time.

## 2024-04-29 ENCOUNTER — TELEPHONE (OUTPATIENT)
Dept: PSYCHIATRY | Facility: CLINIC | Age: 34
End: 2024-04-29

## 2024-04-29 ENCOUNTER — HOSPITAL ENCOUNTER (EMERGENCY)
Facility: HOSPITAL | Age: 34
End: 2024-04-30
Attending: EMERGENCY MEDICINE
Payer: MEDICARE

## 2024-04-29 DIAGNOSIS — F29 PSYCHOSIS (HCC): ICD-10-CM

## 2024-04-29 DIAGNOSIS — Z00.8 EVALUATION BY PSYCHIATRIC SERVICE REQUIRED: Primary | ICD-10-CM

## 2024-04-29 LAB
AMPHETAMINES SERPL QL SCN: NEGATIVE
BARBITURATES UR QL: NEGATIVE
BENZODIAZ UR QL: NEGATIVE
COCAINE UR QL: NEGATIVE
ETHANOL EXG-MCNC: 0 MG/DL
FENTANYL UR QL SCN: NEGATIVE
FLUAV RNA RESP QL NAA+PROBE: NEGATIVE
FLUBV RNA RESP QL NAA+PROBE: NEGATIVE
HYDROCODONE UR QL SCN: NEGATIVE
METHADONE UR QL: NEGATIVE
OPIATES UR QL SCN: NEGATIVE
OXYCODONE+OXYMORPHONE UR QL SCN: NEGATIVE
PCP UR QL: NEGATIVE
RSV RNA RESP QL NAA+PROBE: NEGATIVE
SARS-COV-2 RNA RESP QL NAA+PROBE: NEGATIVE
THC UR QL: NEGATIVE

## 2024-04-29 PROCEDURE — 82075 ASSAY OF BREATH ETHANOL: CPT | Performed by: EMERGENCY MEDICINE

## 2024-04-29 PROCEDURE — 99283 EMERGENCY DEPT VISIT LOW MDM: CPT

## 2024-04-29 PROCEDURE — 0241U HB NFCT DS VIR RESP RNA 4 TRGT: CPT | Performed by: EMERGENCY MEDICINE

## 2024-04-29 PROCEDURE — 80307 DRUG TEST PRSMV CHEM ANLYZR: CPT | Performed by: EMERGENCY MEDICINE

## 2024-04-29 PROCEDURE — 99285 EMERGENCY DEPT VISIT HI MDM: CPT | Performed by: EMERGENCY MEDICINE

## 2024-04-29 RX ORDER — AMOXICILLIN AND CLAVULANATE POTASSIUM 875; 125 MG/1; MG/1
1 TABLET, FILM COATED ORAL EVERY 12 HOURS SCHEDULED
Status: DISCONTINUED | OUTPATIENT
Start: 2024-04-29 | End: 2024-04-30 | Stop reason: HOSPADM

## 2024-04-29 RX ADMIN — AMOXICILLIN AND CLAVULANATE POTASSIUM 1 TABLET: 875; 125 TABLET, FILM COATED ORAL at 20:08

## 2024-04-29 NOTE — ED NOTES
Intake TT the following:  Due to the 302 referring to aggression in more than one place, he will need to be reviewed tomorrow. There is no medical clearance or UDS. Having those done tonight will prevent any delays tomorrow. Thanks    Bed searches to start.
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
## 2024-04-29 NOTE — LETTER
[facility] EMERGENCY DEPARTMENT  [address]  [place] [address]  Dept: [phone]      EMTALA TRANSFER CONSENT    NAME Jose ANNE 1990                              MRN [id number]    I have been informed of my rights regarding examination, treatment, and transfer   by Dr. Cas Dupont MD    Benefits: Specialized equipment and/or services available at the receiving facility (Include comment)________________________    Risks: Potential for delay in receiving treatment      Consent for Transfer:  I acknowledge that my medical condition has been evaluated and explained to me by the emergency department physician or other qualified medical person and/or my attending physician, who has recommended that I be transferred to the service of  Accepting Physician: Dr. Roxanne Ayon at Accepting Facility Name, City & State : [facility] [place], [place]. The above potential benefits of such transfer, the potential risks associated with such transfer, and the probable risks of not being transferred have been explained to me, and I fully understand them.  The doctor has explained that, in my case, the benefits of transfer outweigh the risks.  I agree to be transferred.    I authorize the performance of emergency medical procedures and treatments upon me in both transit and upon arrival at the receiving facility.  Additionally, I authorize the release of any and all medical records to the receiving facility and request they be transported with me, if possible.  I understand that the safest mode of transportation during a medical emergency is an ambulance and that the Hospital advocates the use of this mode of transport. Risks of traveling to the receiving facility by car, including absence of medical control, life sustaining equipment, such as oxygen, and medical personnel has been explained to me and I fully understand  them.    (RICHARD CORRECT BOX BELOW)  [  ]  I consent to the stated transfer and to be transported by ambulance/helicopter.  [  ]  I consent to the stated transfer, but refuse transportation by ambulance and accept full responsibility for my transportation by car.  I understand the risks of non-ambulance transfers and I exonerate the Hospital and its staff from any deterioration in my condition that results from this refusal.    X_________302__________________________________    DATE  24  TIME_9:00PM_  Signature of patient or legally responsible individual signing on patient behalf           RELATIONSHIP TO PATIENT____302____________                    Provider Certification    NAME Joes Herring                                         1990                              MRN [id number]    A medical screening exam was performed on the above named patient.  Based on the examination:    Condition Necessitating Transfer The primary encounter diagnosis was Evaluation by psychiatric service required. A diagnosis of Psychosis (HCC) was also pertinent to this visit.    Patient Condition: The patient has been stabilized such that within reasonable medical probability, no material deterioration of the patient condition or the condition of the unborn child(ralf) is likely to result from the transfer    Reason for Transfer: Level of Care needed not available at this facility    Transfer Requirements: Facility [facility] [place], [place]   Space available and qualified personnel available for treatment as acknowledged by Joni Uribe [phone]  Agreed to accept transfer and to provide appropriate medical treatment as acknowledged by       Dr. Roxanne Ayon  Appropriate medical records of the examination and treatment of the patient are provided at the time of transfer   STAFF INITIAL WHEN COMPLETED __MC_____  Transfer will be performed by qualified personnel from [facility]  [phone]  and appropriate transfer equipment as required, including the use of necessary and appropriate life support measures.    Provider Certification: I have examined the patient and explained the following risks and benefits of being transferred/refusing transfer to the patient/family:  Consent was not obtained as patient is committed to psychiatric facility and transfer is mandated      Based on these reasonable risks and benefits to the patient and/or the unborn child(ralf), and based upon the information available at the time of the patient’s examination, I certify that the medical benefits reasonably to be expected from the provision of appropriate medical treatments at another medical facility outweigh the increasing risks, if any, to the individual’s medical condition, and in the case of labor to the unborn child, from effecting the transfer.    X____________________________________________ DATE 04/29/24        TIME__9:00PM___      ORIGINAL - SEND TO MEDICAL RECORDS   COPY - SEND WITH PATIENT DURING TRANSFER
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
## 2024-04-29 NOTE — ED NOTES
Bed searches completed with the following results    [place]- Nothing today. Faxed 302 and clinicals for tomorrow.  [facility]- Faxed  [place]-Faxed  [facility]-Faxed  [place]-Nothing today. Faxed 302 and clinicals for tomorrow.  [place]-Faxed  [place]-Nothing today. Faxed 302 and clinicals for tomorrow.  [place]  No beds.

## 2024-04-30 VITALS
RESPIRATION RATE: 18 BRPM | SYSTOLIC BLOOD PRESSURE: 116 MMHG | OXYGEN SATURATION: 100 % | TEMPERATURE: 98.7 F | HEART RATE: 76 BPM | DIASTOLIC BLOOD PRESSURE: 75 MMHG

## 2024-04-30 RX ORDER — IBUPROFEN 600 MG/1
600 TABLET ORAL ONCE
Status: COMPLETED | OUTPATIENT
Start: 2024-04-30 | End: 2024-04-30

## 2024-04-30 RX ADMIN — IBUPROFEN 600 MG: 600 TABLET, FILM COATED ORAL at 05:42

## 2024-04-30 NOTE — ED CARE HANDOFF
Emergency Department Sign Out Note        Sign out and transfer of care from [facility]. See Separate Emergency Department note.     The patient, Jose Herring, was evaluated by the previous provider for Psychiatric evaluation.    Workup Completed:  Medical evaluation     ED Course / Workup Pending (followup):  Jere BOLAND           I assumed care of the patient after being medically evaluated.  Patient presents with psychosis and is pending placement.  Patient is on a 302.  Patient also on Augmentin for tooth ache.    The patient is medically cleared for inpatient psychiatric care.                        ED Course as of 04/29/24 2003 Mon Apr 29, 2024   1758 Hx schizophrenia, on 302 for psychosis. Pending transport. On augmentin for toothache     Procedures  Medical Decision Making  Amount and/or Complexity of Data Reviewed  Labs: ordered.    Risk  Prescription drug management.  Decision regarding hospitalization.            Disposition  Final diagnoses:   Evaluation by psychiatric service required   Psychosis (HCC)     Time reflects when diagnosis was documented in both MDM as applicable and the Disposition within this note       Time User Action Codes Description Comment    4/29/2024  2:13 PM Kira Escamilla Add [Z00.8] Evaluation by psychiatric service required     4/29/2024  5:11 PM Kira Escamilla [F29] Psychosis (HCC)           ED Disposition       ED Disposition   Transfer to Behavioral Health Condition   --    Date/Time   Mon Apr 29, 2024  5:11 PM    Comment   Jose Herring should be transferred out to [facility] and has been medically cleared.               Follow-up Information       Follow up With Specialties Details Why Contact Info    [facility] [place]  Schedule an appointment as soon as possible for a visit   [address] [address] [phone]          Patient's Medications   Discharge Prescriptions    No  medications on file     No discharge procedures on file.       ED Provider  Electronically Signed by     Cas Dupont MD  04/29/24 2004

## 2024-04-30 NOTE — ED NOTES
Patient is accepted at [facility]  Patient is accepted by Dr. Roxanne Ayon per Dev    Transportation is arranged with [employer].     Transportation is scheduled for ASAP  Patient may go to the floor at Any time        Nurse report is to be called to  [phone] prior to patient transfer.

## 2024-04-30 NOTE — ED NOTES
[employer] claimed the ride for Jose Herring in unit/room FT 04 bed FT 04, and will arrive on 04/30/2024 at 7:35am EDT.  [phone].

## 2024-05-03 ENCOUNTER — TELEPHONE (OUTPATIENT)
Dept: GASTROENTEROLOGY | Facility: CLINIC | Age: 34
End: 2024-05-03

## 2024-06-14 NOTE — PATIENT INSTRUCTIONS
Cellulitis and tinea pedis both appear to be improving nicely  He has 1 more day of antibiotics that he should complete  Given that his culture was positive for both Enterococcus and  MRSA he was treated with Keflex and Bactrim  He is advised to continue with aggressive treatment of athlete's foot including daily use of Lotrimin to affected areas and foot powder in general   Do not wear the same shoes 2 days in a row  Apply foot powder into the issue after removing it  Avoid cotton socks  Follow up with Podiatry has been arranged  Interestingly his nasal swab was negative for MRSA but wound culture positive  We discussed  MRSA decontamination with nasal mupirocin twice daily for 5 days concordant with chlorhexidine body wash from neck down for 5 days, , wash all your sheets  and towels in hot water over the next 1 or 2 days  onychomycosis of the nail-patient has fatty liver disease and while hospitalized LFTs were abnormal   We discussed consideration for treatment with Lamisil for mycotic nails  If liver enzymes are okay I can call that when in    Patient understands he needs to take the antibiotic for 3 months and it will take approximately 1 year for the nail to completely grow out and look normal  The clinical goals for the shift include Patient's neuro checks will remain WDL through 1900 6/14/24    Patient transferred to  from PICU at 1415. Afebrile, vital signs stable. Lungs clear on room air. Tolerating regular diet, voiding to toilet. No pain reported. Incision clean, dry, and intact. Out of bed with assistance. Neuro checks WDL. Mother at bedside.

## 2024-07-28 ENCOUNTER — OFFICE VISIT (OUTPATIENT)
Dept: URGENT CARE | Facility: CLINIC | Age: 34
End: 2024-07-28
Payer: COMMERCIAL

## 2024-07-28 VITALS
BODY MASS INDEX: 27.89 KG/M2 | HEIGHT: 68 IN | WEIGHT: 184 LBS | SYSTOLIC BLOOD PRESSURE: 131 MMHG | OXYGEN SATURATION: 100 % | DIASTOLIC BLOOD PRESSURE: 82 MMHG | HEART RATE: 94 BPM | TEMPERATURE: 98.3 F

## 2024-07-28 DIAGNOSIS — B37.0 THRUSH, ORAL: Primary | ICD-10-CM

## 2024-07-28 PROCEDURE — 99213 OFFICE O/P EST LOW 20 MIN: CPT

## 2024-07-28 PROCEDURE — G0463 HOSPITAL OUTPT CLINIC VISIT: HCPCS

## 2024-07-28 NOTE — PATIENT INSTRUCTIONS
Nystatin mouthwash as prescribed  Follow up with PCP in 3-5 days.  Proceed to  ER if symptoms worsen.

## 2024-07-28 NOTE — PROGRESS NOTES
[facility]        NAME: Jose Herring is a 34 y.o. male  : 1990    MRN: [id number]  DATE: 2024  TIME: 12:20 PM    Assessment and Plan   Thrush, oral [B37.0]  1. Thrush, oral  nystatin (MYCOSTATIN) 500,000 units/5 mL suspension            Patient Instructions     Nystatin mouthwash as prescribed  Follow up with PCP in 3-5 days.  Proceed to  ER if symptoms worsen.    If tests are performed, our office will contact you with results only if changes need to made to the care plan discussed with you at the visit. You can review your full results on [facility].    Chief Complaint     Chief Complaint   Patient presents with    tongue     Slight discoloration on his tongue and his gums. White and brown on the tongue, along with bad breath and yellow mucus that he keeps bringing up. His gums are white as well. Patient is here cause he is concerned if he has Thrush or a throat infection. Slight sore throat, no fevers. Patient also mentioned that he had his tooth pulled and was not able to brush for a couple of weeks, that is when the issue started. He was treated for a throat infection 3 weeks ago.          History of Present Illness       Recent treatment with antibiotics for throat infection and poor dental uptake over the past few weeks after a dental procedure.  He notes over the last few days, he has had weight plaques on his tongue and on his gums around his teeth.  Is concerned for thrush as he has had it the past.        Review of Systems   Review of Systems   Constitutional:  Negative for chills.   HENT:  Positive for dental problem. Negative for congestion, ear discharge, facial swelling, postnasal drip, rhinorrhea, sore throat and trouble swallowing.    Respiratory:  Negative for cough, chest tightness and shortness of breath.    Cardiovascular:  Negative for chest pain and palpitations.   Gastrointestinal:  Negative for abdominal pain, nausea and vomiting.   Genitourinary:   "Negative for difficulty urinating.   Musculoskeletal:  Negative for myalgias.   Neurological:  Negative for dizziness and headaches.         Current Medications       Current Outpatient Medications:     nystatin (MYCOSTATIN) 500,000 units/5 mL suspension, Apply 5 mL (500,000 Units total) to the mouth or throat 4 (four) times a day, Disp: 473 mL, Rfl: 0    multivitamin (THERAGRAN) TABS, Take 1 tablet by mouth daily, Disp: , Rfl:     triamcinolone (KENALOG) 0.5 % cream, Apply topically 3 (three) times a day, Disp: 15 g, Rfl: 3    Current Allergies     Allergies as of 07/28/2024    (No Known Allergies)            The following portions of the patient's history were reviewed and updated as appropriate: allergies, current medications, past family history, past medical history, past social history, past surgical history and problem list.     Past Medical History:   Diagnosis Date    Anxiety     Depression     GERD (gastroesophageal reflux disease)     H. pylori infection     Hematochezia     Hypercholesteremia     Hyperlipidemia     Psychosis (HCC)     Last Assessed:  11/19/15    Schizophrenia (HCC)        Past Surgical History:   Procedure Laterality Date    COLONOSCOPY      AK COLONOSCOPY FLX DX W/COLLJ SPEC WHEN PFRMD N/A 09/27/2017    Procedure: COLONOSCOPY;  Surgeon: Filippo Rodgers MD;  Location: [facility];  Service: Gastroenterology    TOOTH EXTRACTION  05/2024       Family History   Problem Relation Age of Onset    Hypertension Family     Cancer Family          Medications have been verified.        Objective   /82   Pulse 94   Temp 98.3 °F (36.8 °C)   Ht 5' 8\" (1.727 m)   Wt 83.5 kg (184 lb)   SpO2 100%   BMI 27.98 kg/m²        Physical Exam     Physical Exam  Constitutional:       General: He is not in acute distress.  HENT:      Head: Normocephalic.      Nose: Nose normal.      Mouth/Throat:      Dentition: No gingival swelling, dental abscesses or gum lesions.      Pharynx: Postnasal drip " present.      Tonsils: No tonsillar exudate or tonsillar abscesses. 0 on the left.      Comments: White plac on tongue, red underneath   Eyes:      Pupils: Pupils are equal, round, and reactive to light.   Cardiovascular:      Rate and Rhythm: Normal rate and regular rhythm.      Pulses: Normal pulses.      Heart sounds: Normal heart sounds.   Pulmonary:      Effort: Pulmonary effort is normal.      Breath sounds: Normal breath sounds.   Abdominal:      General: Abdomen is flat.   Musculoskeletal:         General: Normal range of motion.   Skin:     General: Skin is warm and dry.      Capillary Refill: Capillary refill takes less than 2 seconds.   Neurological:      Mental Status: He is alert and oriented to person, place, and time.

## 2024-08-08 ENCOUNTER — RA CDI HCC (OUTPATIENT)
Dept: OTHER | Facility: HOSPITAL | Age: 34
End: 2024-08-08

## 2024-08-13 ENCOUNTER — TELEPHONE (OUTPATIENT)
Age: 34
End: 2024-08-13

## 2024-08-13 DIAGNOSIS — Z13.0 SCREENING FOR DEFICIENCY ANEMIA: Primary | ICD-10-CM

## 2024-08-13 DIAGNOSIS — R73.01 IMPAIRED FASTING GLUCOSE: ICD-10-CM

## 2024-08-13 DIAGNOSIS — Z13.220 SCREENING FOR LIPID DISORDERS: ICD-10-CM

## 2024-08-13 DIAGNOSIS — R53.83 OTHER FATIGUE: ICD-10-CM

## 2024-08-13 NOTE — TELEPHONE ENCOUNTER
FYI :  spoke w/ pt he is still coming in tomorrow to f/u on oral thrush, does not want  to do the labs right now

## 2024-08-13 NOTE — TELEPHONE ENCOUNTER
Pt called in and said he rec'd vm that he had to do his labs or robert appt for tomorrow. He said he isnt sure when he can get in for labs but the appt tomorrow doesn't have anything to do with lab results. He is seeing Brittny from his oral thrush. He would like to know if he can still come in to speak with her about that regardless of labs. Please advise and notify.

## 2024-08-21 ENCOUNTER — OFFICE VISIT (OUTPATIENT)
Age: 34
End: 2024-08-21
Payer: MEDICARE

## 2024-08-21 VITALS
HEART RATE: 78 BPM | HEIGHT: 68 IN | BODY MASS INDEX: 29.1 KG/M2 | RESPIRATION RATE: 18 BRPM | WEIGHT: 192 LBS | OXYGEN SATURATION: 98 % | SYSTOLIC BLOOD PRESSURE: 112 MMHG | DIASTOLIC BLOOD PRESSURE: 78 MMHG

## 2024-08-21 DIAGNOSIS — B37.0 ORAL THRUSH: Primary | ICD-10-CM

## 2024-08-21 DIAGNOSIS — R21 RASH: ICD-10-CM

## 2024-08-21 PROCEDURE — 99213 OFFICE O/P EST LOW 20 MIN: CPT

## 2024-08-21 RX ORDER — CLOTRIMAZOLE AND BETAMETHASONE DIPROPIONATE 10; .64 MG/G; MG/G
CREAM TOPICAL 2 TIMES DAILY
Qty: 45 G | Refills: 1 | Status: SHIPPED | OUTPATIENT
Start: 2024-08-21

## 2024-08-21 NOTE — PROGRESS NOTES
INTERNAL MEDICINE FOLLOW-UP VISIT  [facility] - [facility] INTERNAL MEDICINE [address]    NAME: Jose Herring  AGE: 34 y.o. SEX: male  : 1990     DATE: 2024     Assessment and Plan:   1. Oral thrush  Symptoms have resolved, but he continues to have pale gums.  Recommended obtaining labs and following up with the dentist.    2. Rash  Start applying Lotrisone twice daily to clean area and triamcinolone to arms. Recommended changing out of wet clothes as soon as possible, keeping the area dry, and can use [employer] as needed.  - clotrimazole-betamethasone (LOTRISONE) 1-0.05 % cream; Apply topically 2 (two) times a day  Dispense: 45 g; Refill: 1        No follow-ups on file.       Chief Complaint:     Chief Complaint   Patient presents with    Thrush    Rash     On arm and had a rash on testicles, cream he used turned skin black for a day.      History of Present Illness:   Patient is a 34-year-old male that presents today to follow-up on his oral thrush. He presented to urgent care  and was diagnosed with oral thrush. He was prescribed nystatin mouthwash.     He also complains of a rash that started on his arm and his testicles.  He denies any swelling, pain, masses, itchiness, fevers, chills, recent change in laundry detergent, body wash, shampoo, conditioner, bug bites, recent travel, or recent immunizations.  He has been using hydrocortisone cream with some relief.     The following portions of the patient's history were reviewed and updated as appropriate: allergies, current medications, past family history, past medical history, past social history, past surgical history and problem list.     Review of Systems:     Review of Systems   Constitutional:  Negative for chills and fever.   Skin:  Positive for rash.   Neurological:  Negative for dizziness, light-headedness and headaches.        Past Medical History:     Past Medical History:   Diagnosis Date    Anxiety     Depression   "   GERD (gastroesophageal reflux disease)     H. pylori infection     Hematochezia     Hypercholesteremia     Hyperlipidemia     Psychosis (HCC)     Last Assessed:  11/19/15    Schizophrenia (HCC)         Current Medications:     Current Outpatient Medications:     multivitamin (THERAGRAN) TABS, Take 1 tablet by mouth daily, Disp: , Rfl:     nystatin (MYCOSTATIN) 500,000 units/5 mL suspension, Apply 5 mL (500,000 Units total) to the mouth or throat 4 (four) times a day (Patient not taking: Reported on 8/21/2024), Disp: 473 mL, Rfl: 0    triamcinolone (KENALOG) 0.5 % cream, Apply topically 3 (three) times a day (Patient not taking: Reported on 8/21/2024), Disp: 15 g, Rfl: 3     Allergies:   No Known Allergies     Physical Exam:     /78 (BP Location: Left arm, Patient Position: Sitting, Cuff Size: Standard)   Pulse 78   Resp 18   Ht 5' 8\" (1.727 m)   Wt 87.1 kg (192 lb)   SpO2 98%   BMI 29.19 kg/m²     Physical Exam  Vitals and nursing note reviewed.   Constitutional:       General: He is awake.      Appearance: Normal appearance. He is well-developed, well-groomed and overweight.   HENT:      Head: Normocephalic and atraumatic.      Right Ear: Hearing and external ear normal.      Left Ear: Hearing and external ear normal.      Nose: Nose normal.      Mouth/Throat:      Lips: Pink.      Mouth: Mucous membranes are moist.   Eyes:      General: Lids are normal. Vision grossly intact. Gaze aligned appropriately.      Conjunctiva/sclera: Conjunctivae normal.   Neck:      Vascular: No carotid bruit.      Trachea: Trachea and phonation normal.   Pulmonary:      Effort: Pulmonary effort is normal.   Abdominal:      General: Abdomen is protuberant.   Genitourinary:     Testes:         Right: Mass not present.         Left: Mass not present.      Epididymis:      Right: Not inflamed.      Left: Not inflamed.      Comments: Erythematous and dry testicles.   Musculoskeletal:      Cervical back: Neck supple. "   Skin:     General: Skin is warm.      Capillary Refill: Capillary refill takes less than 2 seconds.      Comments: Dorsal aspect of bilateral arms slightly red.   Neurological:      Mental Status: He is alert.   Psychiatric:         Attention and Perception: Attention and perception normal.         Mood and Affect: Mood and affect normal.         Speech: Speech normal.         Behavior: Behavior normal. Behavior is cooperative.         Thought Content: Thought content normal.         Cognition and Memory: Cognition and memory normal.         Judgment: Judgment normal.           Data:     Laboratory Results: I have personally reviewed the pertinent laboratory results/reports   Radiology/Other Diagnostic Testing Results: I have personally reviewed pertinent reports.      Brittny Croft PA-C  [facility] INTERNAL MEDICINE [address]

## 2024-08-23 DIAGNOSIS — R21 RASH: ICD-10-CM

## 2024-08-23 NOTE — TELEPHONE ENCOUNTER
PT called in stating that he picked up medication: clotrimazole-betamethasone (LOTRISONE) 1-0.05 % cream - but did not receive his other medicated cream: triamcinolone (KENALOG) 0.5 % cream     PT is requesting refill of: triamcinolone (KENALOG) 0.5 % cream     Script going to the following pharmacy:  [facility] #[id number] - [place] - [address] [phone]     Please advise & call pt with update once script has been sent. Thank you!    Jose Herring   [phone]

## 2024-08-26 RX ORDER — TRIAMCINOLONE ACETONIDE 5 MG/G
CREAM TOPICAL 3 TIMES DAILY
Qty: 15 G | Refills: 3 | Status: SHIPPED | OUTPATIENT
Start: 2024-08-26

## 2024-12-13 ENCOUNTER — OFFICE VISIT (OUTPATIENT)
Age: 34
End: 2024-12-13
Payer: MEDICARE

## 2024-12-13 VITALS
DIASTOLIC BLOOD PRESSURE: 98 MMHG | HEIGHT: 68 IN | SYSTOLIC BLOOD PRESSURE: 132 MMHG | TEMPERATURE: 98.8 F | RESPIRATION RATE: 18 BRPM | WEIGHT: 205 LBS | HEART RATE: 104 BPM | OXYGEN SATURATION: 98 % | BODY MASS INDEX: 31.07 KG/M2

## 2024-12-13 DIAGNOSIS — K21.9 GASTROESOPHAGEAL REFLUX DISEASE WITHOUT ESOPHAGITIS: Primary | ICD-10-CM

## 2024-12-13 PROCEDURE — G2211 COMPLEX E/M VISIT ADD ON: HCPCS

## 2024-12-13 PROCEDURE — 99213 OFFICE O/P EST LOW 20 MIN: CPT

## 2024-12-13 RX ORDER — PANTOPRAZOLE SODIUM 20 MG/1
20 TABLET, DELAYED RELEASE ORAL
Qty: 30 TABLET | Refills: 1 | Status: SHIPPED | OUTPATIENT
Start: 2024-12-13 | End: 2025-02-11

## 2024-12-13 NOTE — ASSESSMENT & PLAN NOTE
Seems to be GERD symptoms. However, plan to have patient return in 2 months for re-evaluation and discuss additional treatment if needed.   Orders:    pantoprazole (PROTONIX) 20 mg tablet; Take 1 tablet (20 mg total) by mouth daily before breakfast

## 2024-12-13 NOTE — PROGRESS NOTES
Name: Jose Herring      : 1990      MRN: [id number]  Encounter Provider: Diamond Mann PA-C  Encounter Date: 2024   Encounter department: [facility]  :  Assessment & Plan  Gastroesophageal reflux disease without esophagitis  Seems to be GERD symptoms. However, plan to have patient return in 2 months for re-evaluation and discuss additional treatment if needed.   Orders:    pantoprazole (PROTONIX) 20 mg tablet; Take 1 tablet (20 mg total) by mouth daily before breakfast           History of Present Illness     Patient is a 34 year old male presenting to the clinic for an acute visit.  States back in March, he had a tooth infection and then started coughing up yellow/brown stuff every day. In May, it started getting worse.   No hx of asthma, no family hx of lung conditions.   Not sexually active currently-not exposed to any STDs.   States he had similar symptoms like this before and was given Protonix by Brittny which seemed to help. Endorses increased heart burn over the past few weeks  Denies fever, SOB, n/v/d, and nasal congestion.    Cough  Pertinent negatives include no chest pain, chills, ear pain, fever, headaches, myalgias, postnasal drip, rhinorrhea, sore throat, shortness of breath or wheezing. There is no history of environmental allergies.     Review of Systems   Constitutional:  Negative for chills, fatigue and fever.   HENT:  Negative for ear discharge, ear pain, postnasal drip, rhinorrhea, sinus pressure, sinus pain, sore throat, tinnitus and trouble swallowing.    Eyes:  Negative for pain, discharge and itching.   Respiratory:  Positive for cough. Negative for shortness of breath and wheezing.    Cardiovascular:  Negative for chest pain, palpitations and leg swelling.   Gastrointestinal:  Negative for abdominal pain, constipation, diarrhea, nausea and vomiting.        Heartburn   Endocrine: Negative for polydipsia, polyphagia and polyuria.  "  Genitourinary:  Negative for difficulty urinating, frequency, hematuria and urgency.   Musculoskeletal:  Negative for arthralgias, joint swelling and myalgias.   Skin:  Negative for color change.   Allergic/Immunologic: Negative for environmental allergies.   Neurological:  Negative for dizziness, weakness, light-headedness, numbness and headaches.   Hematological:  Negative for adenopathy.   Psychiatric/Behavioral:  Negative for decreased concentration and sleep disturbance. The patient is not nervous/anxious.        Objective   /98 (BP Location: Left arm)   Pulse 104   Temp 98.8 °F (37.1 °C) (Tympanic Core)   Resp 18   Ht 5' 8\" (1.727 m)   Wt 93 kg (205 lb)   SpO2 98%   BMI 31.17 kg/m²      Physical Exam  Vitals and nursing note reviewed.   Constitutional:       General: He is awake.      Appearance: Normal appearance. He is well-developed, well-groomed and overweight.   HENT:      Head: Normocephalic and atraumatic.      Right Ear: Hearing and external ear normal.      Left Ear: Hearing and external ear normal.      Nose: Nose normal.      Mouth/Throat:      Lips: Pink.      Mouth: Mucous membranes are moist.   Eyes:      General: Lids are normal. Vision grossly intact. Gaze aligned appropriately.      Conjunctiva/sclera: Conjunctivae normal.   Neck:      Vascular: No carotid bruit.      Trachea: Trachea and phonation normal.   Cardiovascular:      Rate and Rhythm: Normal rate and regular rhythm.      Heart sounds: Normal heart sounds, S1 normal and S2 normal. No murmur heard.     No friction rub. No gallop.   Pulmonary:      Effort: Pulmonary effort is normal.      Breath sounds: Normal breath sounds and air entry. No decreased breath sounds, wheezing, rhonchi or rales.   Abdominal:      General: Abdomen is protuberant.      Palpations: Abdomen is soft.   Musculoskeletal:         General: Normal range of motion.      Cervical back: Neck supple.      Right lower leg: No edema.      Left lower leg: " No edema.   Skin:     General: Skin is warm.      Capillary Refill: Capillary refill takes less than 2 seconds.   Neurological:      Mental Status: He is alert.   Psychiatric:         Attention and Perception: Attention and perception normal.         Mood and Affect: Mood and affect normal.         Speech: Speech normal.         Behavior: Behavior normal. Behavior is cooperative.         Thought Content: Thought content normal.         Cognition and Memory: Cognition and memory normal.         Judgment: Judgment normal.

## 2024-12-30 ENCOUNTER — TELEPHONE (OUTPATIENT)
Age: 34
End: 2024-12-30

## 2024-12-30 DIAGNOSIS — M62.830 BACK SPASM: Primary | ICD-10-CM

## 2024-12-30 NOTE — TELEPHONE ENCOUNTER
Patient called stating he was seen on 12/13 and was prescribed Protonix. He states that he has had on and off muscle spasms since taking the medication. He states he read on the med pamphlet that low magnesium can cause muscle spasms and he is wondering what he should do? Please advise.    Patient can be reached at: [phone]

## 2025-01-05 DIAGNOSIS — K21.9 GASTROESOPHAGEAL REFLUX DISEASE WITHOUT ESOPHAGITIS: ICD-10-CM

## 2025-01-07 ENCOUNTER — APPOINTMENT (OUTPATIENT)
Dept: LAB | Facility: CLINIC | Age: 35
End: 2025-01-07
Payer: MEDICARE

## 2025-01-07 DIAGNOSIS — R73.01 IMPAIRED FASTING GLUCOSE: ICD-10-CM

## 2025-01-07 DIAGNOSIS — R53.83 OTHER FATIGUE: ICD-10-CM

## 2025-01-07 DIAGNOSIS — Z13.0 SCREENING FOR DEFICIENCY ANEMIA: ICD-10-CM

## 2025-01-07 DIAGNOSIS — M62.830 BACK SPASM: ICD-10-CM

## 2025-01-07 DIAGNOSIS — Z13.220 SCREENING FOR LIPID DISORDERS: ICD-10-CM

## 2025-01-07 LAB
ALBUMIN SERPL BCG-MCNC: 4.7 G/DL (ref 3.5–5)
ALP SERPL-CCNC: 47 U/L (ref 34–104)
ALT SERPL W P-5'-P-CCNC: 49 U/L (ref 7–52)
ANION GAP SERPL CALCULATED.3IONS-SCNC: 9 MMOL/L (ref 4–13)
AST SERPL W P-5'-P-CCNC: 22 U/L (ref 13–39)
BASOPHILS # BLD AUTO: 0.05 THOUSANDS/ΜL (ref 0–0.1)
BASOPHILS NFR BLD AUTO: 1 % (ref 0–1)
BILIRUB SERPL-MCNC: 0.51 MG/DL (ref 0.2–1)
BUN SERPL-MCNC: 12 MG/DL (ref 5–25)
CALCIUM SERPL-MCNC: 9.7 MG/DL (ref 8.4–10.2)
CHLORIDE SERPL-SCNC: 103 MMOL/L (ref 96–108)
CHOLEST SERPL-MCNC: 219 MG/DL (ref ?–200)
CO2 SERPL-SCNC: 28 MMOL/L (ref 21–32)
CREAT SERPL-MCNC: 0.77 MG/DL (ref 0.6–1.3)
EOSINOPHIL # BLD AUTO: 0.19 THOUSAND/ΜL (ref 0–0.61)
EOSINOPHIL NFR BLD AUTO: 3 % (ref 0–6)
ERYTHROCYTE [DISTWIDTH] IN BLOOD BY AUTOMATED COUNT: 12 % (ref 11.6–15.1)
EST. AVERAGE GLUCOSE BLD GHB EST-MCNC: 100 MG/DL
GFR SERPL CREATININE-BSD FRML MDRD: 119 ML/MIN/1.73SQ M
GLUCOSE P FAST SERPL-MCNC: 96 MG/DL (ref 65–99)
HBA1C MFR BLD: 5.1 %
HCT VFR BLD AUTO: 46.9 % (ref 36.5–49.3)
HDLC SERPL-MCNC: 42 MG/DL
HGB BLD-MCNC: 15.7 G/DL (ref 12–17)
IMM GRANULOCYTES # BLD AUTO: 0.02 THOUSAND/UL (ref 0–0.2)
IMM GRANULOCYTES NFR BLD AUTO: 0 % (ref 0–2)
LDLC SERPL CALC-MCNC: 156 MG/DL (ref 0–100)
LYMPHOCYTES # BLD AUTO: 1.42 THOUSANDS/ΜL (ref 0.6–4.47)
LYMPHOCYTES NFR BLD AUTO: 24 % (ref 14–44)
MAGNESIUM SERPL-MCNC: 2.1 MG/DL (ref 1.9–2.7)
MCH RBC QN AUTO: 29.8 PG (ref 26.8–34.3)
MCHC RBC AUTO-ENTMCNC: 33.5 G/DL (ref 31.4–37.4)
MCV RBC AUTO: 89 FL (ref 82–98)
MONOCYTES # BLD AUTO: 0.43 THOUSAND/ΜL (ref 0.17–1.22)
MONOCYTES NFR BLD AUTO: 7 % (ref 4–12)
NEUTROPHILS # BLD AUTO: 3.85 THOUSANDS/ΜL (ref 1.85–7.62)
NEUTS SEG NFR BLD AUTO: 65 % (ref 43–75)
NONHDLC SERPL-MCNC: 177 MG/DL
NRBC BLD AUTO-RTO: 0 /100 WBCS
PLATELET # BLD AUTO: 258 THOUSANDS/UL (ref 149–390)
PMV BLD AUTO: 9.6 FL (ref 8.9–12.7)
POTASSIUM SERPL-SCNC: 4.5 MMOL/L (ref 3.5–5.3)
PROT SERPL-MCNC: 7.6 G/DL (ref 6.4–8.4)
RBC # BLD AUTO: 5.27 MILLION/UL (ref 3.88–5.62)
SODIUM SERPL-SCNC: 140 MMOL/L (ref 135–147)
TRIGL SERPL-MCNC: 103 MG/DL (ref ?–150)
TSH SERPL DL<=0.05 MIU/L-ACNC: 1.07 UIU/ML (ref 0.45–4.5)
WBC # BLD AUTO: 5.96 THOUSAND/UL (ref 4.31–10.16)

## 2025-01-07 PROCEDURE — 83036 HEMOGLOBIN GLYCOSYLATED A1C: CPT

## 2025-01-07 PROCEDURE — 84443 ASSAY THYROID STIM HORMONE: CPT

## 2025-01-07 PROCEDURE — 80061 LIPID PANEL: CPT

## 2025-01-07 PROCEDURE — 80053 COMPREHEN METABOLIC PANEL: CPT

## 2025-01-07 PROCEDURE — 83735 ASSAY OF MAGNESIUM: CPT

## 2025-01-07 PROCEDURE — 85025 COMPLETE CBC W/AUTO DIFF WBC: CPT

## 2025-01-07 PROCEDURE — 36415 COLL VENOUS BLD VENIPUNCTURE: CPT

## 2025-01-07 RX ORDER — PANTOPRAZOLE SODIUM 20 MG/1
20 TABLET, DELAYED RELEASE ORAL
Qty: 30 TABLET | Refills: 0 | Status: SHIPPED | OUTPATIENT
Start: 2025-01-07

## 2025-01-07 NOTE — TELEPHONE ENCOUNTER
Will only refill 30 at this time, normally we do not try to do PPI therapy for longer than 2 months unless necessary. We can discuss next steps at our follow up next month

## 2025-01-08 ENCOUNTER — TELEPHONE (OUTPATIENT)
Age: 35
End: 2025-01-08

## 2025-01-08 NOTE — TELEPHONE ENCOUNTER
Caller: Patient     Doctor/Office: PCP    Call regarding :  returning missed call      Call was transferred to: PCP office

## 2025-02-04 DIAGNOSIS — K21.9 GASTROESOPHAGEAL REFLUX DISEASE WITHOUT ESOPHAGITIS: ICD-10-CM

## 2025-02-05 RX ORDER — PANTOPRAZOLE SODIUM 20 MG/1
20 TABLET, DELAYED RELEASE ORAL
Qty: 90 TABLET | Refills: 1 | Status: SHIPPED | OUTPATIENT
Start: 2025-02-05

## 2025-02-12 ENCOUNTER — RA CDI HCC (OUTPATIENT)
Dept: OTHER | Facility: HOSPITAL | Age: 35
End: 2025-02-12

## 2025-02-18 ENCOUNTER — OFFICE VISIT (OUTPATIENT)
Age: 35
End: 2025-02-18
Payer: MEDICARE

## 2025-02-18 VITALS
OXYGEN SATURATION: 99 % | HEART RATE: 86 BPM | HEIGHT: 68 IN | DIASTOLIC BLOOD PRESSURE: 76 MMHG | SYSTOLIC BLOOD PRESSURE: 110 MMHG | BODY MASS INDEX: 31.67 KG/M2 | WEIGHT: 209 LBS | RESPIRATION RATE: 16 BRPM

## 2025-02-18 DIAGNOSIS — R09.82 POST-NASAL DRIP: ICD-10-CM

## 2025-02-18 DIAGNOSIS — E78.5 HYPERLIPIDEMIA, UNSPECIFIED HYPERLIPIDEMIA TYPE: ICD-10-CM

## 2025-02-18 DIAGNOSIS — F20.9 SCHIZOPHRENIA, UNSPECIFIED TYPE (HCC): ICD-10-CM

## 2025-02-18 DIAGNOSIS — K21.9 GASTROESOPHAGEAL REFLUX DISEASE WITHOUT ESOPHAGITIS: Primary | ICD-10-CM

## 2025-02-18 DIAGNOSIS — J34.3 NASAL TURBINATE HYPERTROPHY: ICD-10-CM

## 2025-02-18 DIAGNOSIS — J32.9 CHRONIC SINUSITIS, UNSPECIFIED LOCATION: ICD-10-CM

## 2025-02-18 PROCEDURE — 99214 OFFICE O/P EST MOD 30 MIN: CPT

## 2025-02-18 PROCEDURE — G2211 COMPLEX E/M VISIT ADD ON: HCPCS

## 2025-02-18 RX ORDER — PALIPERIDONE PALMITATE 234 MG/1.5ML
INJECTION INTRAMUSCULAR
COMMUNITY
Start: 2025-02-17

## 2025-02-18 NOTE — PROGRESS NOTES
"Name: Jose Herring      : 1990      MRN: [id number]  Encounter Provider: Diamond Mann PA-C  Encounter Date: 2025   Encounter department: [facility] [address]  :  Assessment & Plan  Gastroesophageal reflux disease without esophagitis  GERD symptoms appear to be stable.  Per ENT note-recommended patient continue Protonix until their next follow-up on 3/13/2025       Hyperlipidemia, unspecified hyperlipidemia type  Most likely related to his Invega ejection.  Patient eats well-plenty of fruits and vegetables/whole foods.       Chronic sinusitis, unspecified location  Patient evaluated by ENT on 2025-had fiberoptic nasopharyngolaryngoscopy: Moderate turbinate hypertrophy bilaterally, no obvious polyps/infection/lesions, mild diffuse edema without vocal fold edema.       Post-nasal drip  Patient placed on Flonase daily.  Feels like its helped a little bit.  Recommended saline rinses as well.  Can trial Claritin if you would like as well.  Has scheduled follow-up with ENT on 3/13/2025       Nasal turbinate hypertrophy  Recent ENT procedure demonstrated moderate turbinate hypertrophy bilaterally without obvious polyps/infection/lesions       Schizophrenia, unspecified type (HCC)                History of Present Illness   Patient is a 34 year old male presenting for follow up.  -feels like the protonix didn't help  Recently saw ENT for sinus issues-was diagnosed with chronic sinusitis and given Flonase  -started the Flonase on 25-feels like its helped a bit      Review of Systems    Objective   /76 (BP Location: Left arm, Patient Position: Sitting, Cuff Size: Standard)   Pulse 86   Resp 16   Ht 5' 8\" (1.727 m)   Wt 94.8 kg (209 lb)   SpO2 99%   BMI 31.78 kg/m²      Physical Exam    "

## 2025-02-18 NOTE — ASSESSMENT & PLAN NOTE
GERD symptoms appear to be stable.  Per ENT note-recommended patient continue Protonix until their next follow-up on 3/13/2025

## 2025-02-18 NOTE — ASSESSMENT & PLAN NOTE
Most likely related to his Invega ejection.  Patient eats well-plenty of fruits and vegetables/whole foods.

## 2025-05-16 ENCOUNTER — TELEPHONE (OUTPATIENT)
Age: 35
End: 2025-05-16

## 2025-05-16 NOTE — TELEPHONE ENCOUNTER
Patient says he has been taking   pantoprazole (PROTONIX) 20 mg tablet [[id number]]  since December. Doesn't have symptoms of GERD anymore. He wants to know if he should still take it?     Would like a call from Diamond Mann if possible. Please advise. Thank you!       Jose: [phone]

## 2025-06-05 ENCOUNTER — TELEPHONE (OUTPATIENT)
Age: 35
End: 2025-06-05

## 2025-06-27 ENCOUNTER — TELEPHONE (OUTPATIENT)
Age: 35
End: 2025-06-27

## 2025-06-27 NOTE — TELEPHONE ENCOUNTER
Patient called stated, he is unable to schedule an appt right now, He doesn't have insurance he will have insurance in October  and will then call back for an appointment.    He also stated he will need medication refills in August and will call a week before he runs out of medication.    Please advise, thank you.

## 2025-06-27 NOTE — TELEPHONE ENCOUNTER
Please let patient know we will send refills in when he needs them he was seen within the past year,  he will need to make appointment come October when he has insurance.

## 2025-08-03 DIAGNOSIS — K21.9 GASTROESOPHAGEAL REFLUX DISEASE WITHOUT ESOPHAGITIS: ICD-10-CM

## 2025-08-05 RX ORDER — PANTOPRAZOLE SODIUM 20 MG/1
20 TABLET, DELAYED RELEASE ORAL
Qty: 90 TABLET | Refills: 1 | Status: SHIPPED | OUTPATIENT
Start: 2025-08-05